# Patient Record
Sex: FEMALE | NOT HISPANIC OR LATINO | Employment: UNEMPLOYED | URBAN - METROPOLITAN AREA
[De-identification: names, ages, dates, MRNs, and addresses within clinical notes are randomized per-mention and may not be internally consistent; named-entity substitution may affect disease eponyms.]

---

## 2018-09-27 ENCOUNTER — TELEPHONE (OUTPATIENT)
Dept: PSYCHIATRY | Facility: CLINIC | Age: 43
End: 2018-09-27

## 2018-09-27 NOTE — TELEPHONE ENCOUNTER
PSYCHIATRY CLINIC PHONE INTAKE     SERVICES REQUESTED / INTERESTED IN          Med Management    Presenting Problem and Brief History                              What would you like to be seen for? (brief description):  Patient experiences irritability, self-isolation, rapid heart rate, headaches, and sometimes body tension. Patient is currently seeing a therapist and is in treatment for alcoholism for the past 3 months.  Have you received a mental health diagnosis? Yes   Which one (s): Anxiety and depression  Is there any history of developmental delay?  No   Are you currently seeing a mental health provider?  Yes            Who / month last seen:  Therapist  Do you have mental health records elsewhere?  Yes  Will you sign a release so we can obtain them?  Yes    Have you ever been hospitalized for psychiatric reasons?  Not for psychiatric reason  Describe:  Yazidism - April/May 2018    Do you have current thoughts of self-harm?  Yes  - Once in a while  Do you currently have thoughts of harming others?  No       Substance Use History     Do you have any history of alcohol / illicit drug use?  Yes  Describe:  Alcohol - sober 3 months  Have you ever received treatment for this?  Yes    Describe:  Currently in treatment     Social History     Does the patient have a guardian?  No      Have you had an ACT team in last 12 months?  No    Do you have any current or past legal issues?  No    OK to leave a detailed voicemail?  Yes    Medical/ Surgical History                                 There is no problem list on file for this patient.         Medications             No current outpatient prescriptions on file.     Buspirone   cymbalta   Trazodone 50mg  Xanax 0.5mg    DISPOSITION      Completed phone screen. Scheduled with Nelson Waddell.    Radha Sim,

## 2018-10-05 ENCOUNTER — OFFICE VISIT (OUTPATIENT)
Dept: PSYCHIATRY | Facility: CLINIC | Age: 43
End: 2018-10-05
Attending: NURSE PRACTITIONER
Payer: COMMERCIAL

## 2018-10-05 VITALS — DIASTOLIC BLOOD PRESSURE: 78 MMHG | HEART RATE: 104 BPM | SYSTOLIC BLOOD PRESSURE: 115 MMHG | WEIGHT: 293 LBS

## 2018-10-05 DIAGNOSIS — F41.9 ANXIETY: Primary | ICD-10-CM

## 2018-10-05 PROCEDURE — G0463 HOSPITAL OUTPT CLINIC VISIT: HCPCS | Mod: ZF

## 2018-10-05 RX ORDER — BUSPIRONE HYDROCHLORIDE 15 MG/1
15 TABLET ORAL 2 TIMES DAILY
Qty: 60 TABLET | Refills: 0 | Status: SHIPPED | OUTPATIENT
Start: 2018-10-05 | End: 2018-11-13

## 2018-10-05 RX ORDER — TRAZODONE HYDROCHLORIDE 100 MG/1
100 TABLET ORAL PRN
Qty: 30 TABLET | Refills: 0 | Status: SHIPPED | OUTPATIENT
Start: 2018-10-05 | End: 2018-11-13

## 2018-10-05 RX ORDER — PRAVASTATIN SODIUM 20 MG
20 TABLET ORAL PRN
COMMUNITY
End: 2021-08-04

## 2018-10-05 ASSESSMENT — PAIN SCALES - GENERAL: PAINLEVEL: SEVERE PAIN (7)

## 2018-10-05 NOTE — LETTER
Patient:  Mason Crowe  :   1975  MRN:     [id number]      2018    Patient Name:  Mason Crowe    Physician: ERNIE Isaacs CNP    Mason attended her psychiatric evaluation today from 1:00-2:00pm.  Her next appointment will be in 1 month.          ____________________________________  Nelson Waddell DNP, ERNIE        [id number]  1975
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
## 2018-10-05 NOTE — MR AVS SNAPSHOT
After Visit Summary   10/5/2018    Mason Crowe    MRN: [id number]           Patient Information     Date Of Birth          1975        Visit Information        Provider Department      10/5/2018 1:00 PM Nelson Waddell APRN CNP Psychiatry Clinic        Today's Diagnoses     Anxiety    -  1       Follow-ups after your visit        Your next 10 appointments already scheduled     Oct 31, 2018  5:00 PM CDT   Adult Med Follow UP with ERNIE Perdomo CNP   Psychiatry Clinic ([facility])    [address]  [address] [id number] [phone]              Who to contact     Please call your clinic at [phone] to:    Ask questions about your health    Make or cancel appointments    Discuss your medicines    Learn about your test results    Speak to your doctor            Additional Information About Your Visit        MyChart Information     Play2Focust is an electronic gateway that provides easy, online access to your medical records. With Slack, you can request a clinic appointment, read your test results, renew a prescription or communicate with your care team.     To sign up for [employer] visit the website at [email or web address]   You will be asked to enter the access code listed below, as well as some personal information. Please follow the directions to create your username and password.     Your access code is: [email or web address]  Expires: 2018  9:37 AM     Your access code will  in 90 days. If you need help or a new code, please contact your [facility] or call [phone] for assistance.        Care EveryWhere ID     This is your Care EveryWhere ID. This could be used by other organizations to access your [place] medical records  [id number]        Your Vitals Were     Pulse                   104            Blood Pressure from Last 3 Encounters:   10/05/18 115/78    Weight from  Last 3 Encounters:   10/05/18 137.1 kg (302 lb 3.2 oz)              Today, you had the following     No orders found for display         Today's Medication Changes          These changes are accurate as of 10/5/18 11:59 PM.  If you have any questions, ask your nurse or doctor.               These medicines have changed or have updated prescriptions.        Dose/Directions    busPIRone 15 MG tablet   Commonly known as:  BUSPAR   This may have changed:    - medication strength  - how much to take  - when to take this   Used for:  Anxiety   Changed by:  Nelson Waddell APRN CNP        Dose:  15 mg   Take 1 tablet (15 mg) by mouth 2 times daily   Quantity:  60 tablet   Refills:  0       traZODone 100 MG tablet   Commonly known as:  DESYREL   This may have changed:    - medication strength  - how much to take   Used for:  Anxiety   Changed by:  Nelson Waddell APRN CNP        Dose:  100 mg   Take 1 tablet (100 mg) by mouth as needed for sleep   Quantity:  30 tablet   Refills:  0            Where to get your medicines      These medications were sent to [facility] [address]  [address] [address]     Phone:  [phone]     busPIRone 15 MG tablet    traZODone 100 MG tablet                Primary Care Provider Fax #    Physician No Ref-Primary [phone]       No address on file        Equal Access to Services     FANI WILSON AH: Vince cardonao Somaggie, waaxda luqadaha, qaybta kaalmada adeegyada, darwin nance. So [facility] [phone].    ATENCIÓN: Si habla español, tiene a joiner disposición servicios gratuitos de asistencia lingüística. Llame al [phone].    We comply with applicable federal civil rights laws and [place] laws. We do not discriminate on the basis of race, color, national origin, age, disability, sex, sexual orientation, or gender identity.            Thank you!     Thank you for choosing PSYCHIATRY  CLINIC  for your care. Our goal is always to provide you with excellent care. Hearing back from our patients is one way we can continue to improve our services. Please take a few minutes to complete the written survey that you may receive in the mail after your visit with us. Thank you!             Your Updated Medication List - Protect others around you: Learn how to safely use, store and throw away your medicines at www.disposemymeds.org.          This list is accurate as of 10/5/18 11:59 PM.  Always use your most recent med list.                   Brand Name Dispense Instructions for use Diagnosis    AMOXICILLIN PO      Take by mouth 3 times daily        ASPIRIN PO      Take 81 mg by mouth daily        busPIRone 15 MG tablet    BUSPAR    60 tablet    Take 1 tablet (15 mg) by mouth 2 times daily    Anxiety       CYMBALTA PO      Take 30 mg by mouth daily        GABAPENTIN PO      Take 800 mg by mouth 3 times daily        IBUPROFEN PO      Take 600 mg by mouth every 6 hours        LASIX PO      Take 40 mg by mouth daily        pravastatin 20 MG tablet    PRAVACHOL     Take 20 mg by mouth as needed        traZODone 100 MG tablet    DESYREL    30 tablet    Take 1 tablet (100 mg) by mouth as needed for sleep    Anxiety       VERAPAMIL HCL PO      Take 240 mg by mouth daily

## 2018-10-29 ENCOUNTER — TELEPHONE (OUTPATIENT)
Dept: PSYCHIATRY | Facility: CLINIC | Age: 43
End: 2018-10-29

## 2018-10-29 ASSESSMENT — PATIENT HEALTH QUESTIONNAIRE - PHQ9: SUM OF ALL RESPONSES TO PHQ QUESTIONS 1-9: 20

## 2018-10-29 NOTE — TELEPHONE ENCOUNTER
On 10/5/18 the patient signed a [facility] authorizing person to person communication with Jeovany Crowe (son) for scheduling, medical, and billing information. This writer sent the original copy to scanning on 10/29/18, and a copy was kept in psychiatry until scanning complete. Daphne Aguilera LPN

## 2018-11-13 ENCOUNTER — OFFICE VISIT (OUTPATIENT)
Dept: PSYCHIATRY | Facility: CLINIC | Age: 43
End: 2018-11-13
Attending: NURSE PRACTITIONER
Payer: COMMERCIAL

## 2018-11-13 VITALS — SYSTOLIC BLOOD PRESSURE: 139 MMHG | WEIGHT: 293 LBS | HEART RATE: 85 BPM | DIASTOLIC BLOOD PRESSURE: 86 MMHG

## 2018-11-13 DIAGNOSIS — F32.1 MODERATE MAJOR DEPRESSION (H): Primary | ICD-10-CM

## 2018-11-13 DIAGNOSIS — F41.9 ANXIETY: ICD-10-CM

## 2018-11-13 PROCEDURE — G0463 HOSPITAL OUTPT CLINIC VISIT: HCPCS | Mod: ZF

## 2018-11-13 RX ORDER — ACETAMINOPHEN 500 MG
1000 TABLET ORAL EVERY 8 HOURS PRN
COMMUNITY
Start: 2018-10-24 | End: 2023-08-14

## 2018-11-13 RX ORDER — BUSPIRONE HYDROCHLORIDE 15 MG/1
15 TABLET ORAL 2 TIMES DAILY
Qty: 60 TABLET | Refills: 0 | Status: SHIPPED | OUTPATIENT
Start: 2018-11-13 | End: 2019-01-09

## 2018-11-13 RX ORDER — TRAZODONE HYDROCHLORIDE 100 MG/1
100 TABLET ORAL PRN
Qty: 30 TABLET | Refills: 0 | Status: SHIPPED | OUTPATIENT
Start: 2018-11-13 | End: 2019-03-26

## 2018-11-13 RX ORDER — DULOXETIN HYDROCHLORIDE 60 MG/1
60 CAPSULE, DELAYED RELEASE ORAL DAILY
Qty: 30 CAPSULE | Refills: 0 | Status: SHIPPED | OUTPATIENT
Start: 2018-11-13 | End: 2019-01-09

## 2018-11-13 ASSESSMENT — PAIN SCALES - GENERAL: PAINLEVEL: MODERATE PAIN (5)

## 2018-11-13 NOTE — PATIENT INSTRUCTIONS
Thank you for coming to the PSYCHIATRY CLINIC.    Lab Testing:  If you had lab testing today and your results are reassuring or normal they will be mailed to you or sent through [employer] within 7 days.   If the lab tests need quick action we will call you with the results.  The phone number we will call with results is # [phone] (home) . If this is not the best number please call our clinic and change the number.    Medication Refills:  If you need any refills please call your pharmacy and they will contact us. Our fax number for refills is [phone]. Please allow three business for refill processing.   If you need to  your refill at a new pharmacy, please contact the new pharmacy directly. The new pharmacy will help you get your medications transferred.     Scheduling:  If you have any concerns about today's visit or wish to schedule another appointment please call our office during normal business hours [phone] (8-5:00 M-F)    Contact Us:  Please call [phone] during business hours (8-5:00 M-F).  If after clinic hours, or on the weekend, please call  [phone].    Financial Assistance [phone]  [facility] Billing [phone]  [employer] Billing [phone]  Medical Records [phone]      MENTAL HEALTH CRISIS NUMBERS:  [facility]:   [facility] - [phone]   Crisis Residence [facility] - [phone]   Walk-In Counseling [facility] [phone]   COPE 24/7 [place] Mobile Team for Adults - [[phone]]; Child - [[phone]]     Crisis Connection - [phone]     [facility]:   [facility] - [phone]   Walk-in counseling [facility] - [phone]   Walk-in counseling [facility] - [phone]   Crisis Residence [facility] - [phone]   Urgent Care Adult Mental Health:   --Drop-in, 24/7 crisis line, and [place] Mobile Team [[phone]]    CRISIS TEXT  LINE: Text [phone] from anywhere, anytime, any crisis 24/7;    OR SEE www.crisistextline.org     Poison Control Center - [phone]    CHILD: [facility] needs assessment team - [phone]     [facility] - [phone]; or [employer] Lifeline - [phone]    If you have a medical emergency please call 911or go to the nearest ER.                    _____________________________________________    Again thank you for choosing PSYCHIATRY CLINIC and please let us know how we can best partner with you to improve you and your family's health.  You may be receiving a survey in the mail regarding this appointment. We would love to have your feedback, both positive and negative, so please fill out the survey and return it using the provided envelope. The survey is done by an external company, so your answers are anonymous.

## 2018-11-13 NOTE — MR AVS SNAPSHOT
After Visit Summary   11/13/2018    Mason Crowe    MRN: [id number]           Patient Information     Date Of Birth          1975        Visit Information        Provider Department      11/13/2018 3:30 PM Nelson Waddell APRN CNP Psychiatry Clinic        Today's Diagnoses     Moderate major depression (H)    -  1    Anxiety          Care Instructions    Thank you for coming to the PSYCHIATRY CLINIC.    Lab Testing:  If you had lab testing today and your results are reassuring or normal they will be mailed to you or sent through [employer] within 7 days.   If the lab tests need quick action we will call you with the results.  The phone number we will call with results is # [phone] (home) . If this is not the best number please call our clinic and change the number.    Medication Refills:  If you need any refills please call your pharmacy and they will contact us. Our fax number for refills is [phone]. Please allow three business for refill processing.   If you need to  your refill at a new pharmacy, please contact the new pharmacy directly. The new pharmacy will help you get your medications transferred.     Scheduling:  If you have any concerns about today's visit or wish to schedule another appointment please call our office during normal business hours [phone] (8-5:00 M-F)    Contact Us:  Please call [phone] during business hours (8-5:00 M-F).  If after clinic hours, or on the weekend, please call  [phone].    Financial Assistance [phone]  [employer] Billing [phone]  [facility] Billing [phone]  Medical Records [phone]      MENTAL HEALTH CRISIS NUMBERS:  [facility]:   [facility] - [phone]   Crisis Residence [facility] - [phone]   Walk-In Counseling Center [facility] - [phone]   COPE 24/7 [place] Mobile Team for Adults - [[phone]]; Child - [[phone]]     Crisis Connection -  [phone]     [facility] - [phone]   Walk-in counseling [facility] - [phone]   Walk-in counseling [facility] - [phone]   Crisis Residence [facility] - [phone]   Urgent Care Adult Mental Health:   --Drop-in, 24/7 crisis line, and [place] Mobile Team [[phone]]    CRISIS TEXT LINE: Text [phone] from anywhere, anytime, any crisis 24/7;    OR SEE www.crisistextline.org     Poison Control Center - [phone]    CHILD: [facility] needs assessment team - [phone]     [facility] Lifeline - [phone]; or [facility] Lifeline - [phone]    If you have a medical emergency please call 911or go to the nearest ER.                    _____________________________________________    Again thank you for choosing PSYCHIATRY CLINIC and please let us know how we can best partner with you to improve you and your family's health.  You may be receiving a survey in the mail regarding this appointment. We would love to have your feedback, both positive and negative, so please fill out the survey and return it using the provided envelope. The survey is done by an external company, so your answers are anonymous.             Follow-ups after your visit        Your next 10 appointments already scheduled     Dec 13, 2018  9:00 AM CST   Adult Med Follow UP with ERNIE Perdomo CNP   Psychiatry Clinic ([facility])    [address] [address] [phone]              Who to contact     Please call your clinic at [phone] to:    Ask questions about your health    Make or cancel appointments    Discuss your medicines    Learn about your test results    Speak to your doctor            Additional Information About Your Visit        MyChart Information     [employer] is an electronic gateway that provides easy, online access to your medical  records. With [employer], you can request a clinic appointment, read your test results, renew a prescription or communicate with your care team.     To sign up for [employer] visit the website at [email or web address]   You will be asked to enter the access code listed below, as well as some personal information. Please follow the directions to create your username and password.     Your access code is: [email or web address]  Expires: 2019  2:33 PM     Your access code will  in 90 days. If you need help or a new code, please contact your [facility] or call [phone] for assistance.        Care EveryWhere ID     This is your Care EveryWhere ID. This could be used by other organizations to access your [facility] medical records  [id number]        Your Vitals Were     Pulse                   85            Blood Pressure from Last 3 Encounters:   18 139/86   10/05/18 115/78    Weight from Last 3 Encounters:   18 138.1 kg (304 lb 6.4 oz)   10/05/18 137.1 kg (302 lb 3.2 oz)              Today, you had the following     No orders found for display         Today's Medication Changes          These changes are accurate as of 18  4:16 PM.  If you have any questions, ask your nurse or doctor.               These medicines have changed or have updated prescriptions.        Dose/Directions    DULoxetine 60 MG EC capsule   Commonly known as:  CYMBALTA   This may have changed:    - medication strength  - how much to take   Used for:  Anxiety, Moderate major depression (H)   Changed by:  Nelson Waddell, APRN CNP        Dose:  60 mg   Take 1 capsule (60 mg) by mouth daily   Quantity:  30 capsule   Refills:  0            Where to get your medicines      These medications were sent to [facility] [place], [place] - [address]  [address] [address]     Phone:  [phone]     busPIRone 15 MG tablet    DULoxetine 60 MG EC capsule     traZODone 100 MG tablet                Primary Care Provider Fax #    Physician No Ref-Primary [phone]       No address on file        Equal Access to Services     FANI WILSON : Hadii adams cruz amanda Grimm, savi mahmood, bereket jolly, darwin nance. So [facility] [phone].    ATENCIÓN: Si habla español, tiene a joiner disposición servicios gratuitos de asistencia lingüística. Llame al [phone].    We comply with applicable federal civil rights laws and [place] laws. We do not discriminate on the basis of race, color, national origin, age, disability, sex, sexual orientation, or gender identity.            Thank you!     Thank you for choosing PSYCHIATRY CLINIC  for your care. Our goal is always to provide you with excellent care. Hearing back from our patients is one way we can continue to improve our services. Please take a few minutes to complete the written survey that you may receive in the mail after your visit with us. Thank you!             Your Updated Medication List - Protect others around you: Learn how to safely use, store and throw away your medicines at www.disposemymeds.org.          This list is accurate as of 11/13/18  4:16 PM.  Always use your most recent med list.                   Brand Name Dispense Instructions for use Diagnosis    acetaminophen 500 MG tablet    TYLENOL     Take 1,000 mg by mouth 3 times daily        AMOXICILLIN PO      Take by mouth 3 times daily        ASPIRIN PO      Take 81 mg by mouth daily        busPIRone 15 MG tablet    BUSPAR    60 tablet    Take 1 tablet (15 mg) by mouth 2 times daily    Anxiety       DULoxetine 60 MG EC capsule    CYMBALTA    30 capsule    Take 1 capsule (60 mg) by mouth daily    Anxiety, Moderate major depression (H)       GABAPENTIN PO      Take 800 mg by mouth 3 times daily        IBUPROFEN PO      Take 600 mg by mouth every 6 hours        LASIX PO      Take 40 mg by mouth daily         pravastatin 20 MG tablet    PRAVACHOL     Take 20 mg by mouth as needed        traZODone 100 MG tablet    DESYREL    30 tablet    Take 1 tablet (100 mg) by mouth as needed for sleep    Anxiety       VERAPAMIL HCL PO      Take 240 mg by mouth daily
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
## 2018-11-15 ENCOUNTER — TELEPHONE (OUTPATIENT)
Dept: PSYCHIATRY | Facility: CLINIC | Age: 43
End: 2018-11-15

## 2018-11-20 NOTE — TELEPHONE ENCOUNTER
On 11/13/2018 provider Nelson Waddell faxed paperwork for patient to [facility] at [phone]. This writer sent the forms to scanning and kept a copy in psychiatry until scanning is complete/confirmed. This writer routed note to provider. Eunice Lujan MA

## 2018-12-05 ASSESSMENT — PATIENT HEALTH QUESTIONNAIRE - PHQ9: SUM OF ALL RESPONSES TO PHQ QUESTIONS 1-9: 11

## 2019-01-09 ENCOUNTER — OFFICE VISIT (OUTPATIENT)
Dept: PSYCHIATRY | Facility: CLINIC | Age: 44
End: 2019-01-09
Attending: NURSE PRACTITIONER
Payer: COMMERCIAL

## 2019-01-09 VITALS — SYSTOLIC BLOOD PRESSURE: 138 MMHG | WEIGHT: 293 LBS | DIASTOLIC BLOOD PRESSURE: 81 MMHG | HEART RATE: 92 BPM

## 2019-01-09 DIAGNOSIS — F41.9 ANXIETY: Primary | ICD-10-CM

## 2019-01-09 PROCEDURE — G0463 HOSPITAL OUTPT CLINIC VISIT: HCPCS | Mod: ZF

## 2019-01-09 RX ORDER — HYDROXYZINE HYDROCHLORIDE 10 MG/1
TABLET, FILM COATED ORAL
Qty: 120 TABLET | Refills: 0 | Status: SHIPPED | OUTPATIENT
Start: 2019-01-09 | End: 2019-02-11

## 2019-01-09 RX ORDER — SERTRALINE HYDROCHLORIDE 100 MG/1
TABLET, FILM COATED ORAL
Qty: 30 TABLET | Refills: 0 | Status: SHIPPED | OUTPATIENT
Start: 2019-01-09 | End: 2019-02-11

## 2019-01-09 RX ORDER — BUSPIRONE HYDROCHLORIDE 15 MG/1
TABLET ORAL
Refills: 0 | COMMUNITY
Start: 2019-01-09 | End: 2019-03-26 | Stop reason: ALTCHOICE
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
## 2019-02-11 DIAGNOSIS — F41.9 ANXIETY: ICD-10-CM

## 2019-02-11 RX ORDER — HYDROXYZINE HYDROCHLORIDE 10 MG/1
TABLET, FILM COATED ORAL
Qty: 120 TABLET | Refills: 0 | Status: SHIPPED | OUTPATIENT
Start: 2019-02-11 | End: 2019-03-22

## 2019-02-11 RX ORDER — SERTRALINE HYDROCHLORIDE 100 MG/1
TABLET, FILM COATED ORAL
Qty: 30 TABLET | Refills: 0 | Status: SHIPPED | OUTPATIENT
Start: 2019-02-11 | End: 2019-03-22
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
## 2019-03-22 DIAGNOSIS — F41.9 ANXIETY: ICD-10-CM
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
## 2019-03-25 DIAGNOSIS — F41.9 ANXIETY: ICD-10-CM

## 2019-03-26 ENCOUNTER — OFFICE VISIT (OUTPATIENT)
Dept: PSYCHIATRY | Facility: CLINIC | Age: 44
End: 2019-03-26
Attending: NURSE PRACTITIONER
Payer: COMMERCIAL

## 2019-03-26 VITALS — SYSTOLIC BLOOD PRESSURE: 106 MMHG | HEART RATE: 80 BPM | DIASTOLIC BLOOD PRESSURE: 72 MMHG | WEIGHT: 293 LBS

## 2019-03-26 DIAGNOSIS — F33.1 MODERATE EPISODE OF RECURRENT MAJOR DEPRESSIVE DISORDER (H): ICD-10-CM

## 2019-03-26 DIAGNOSIS — F41.9 RECURRENT ANXIETY: Primary | ICD-10-CM

## 2019-03-26 DIAGNOSIS — F41.9 ANXIETY: ICD-10-CM

## 2019-03-26 PROCEDURE — G0463 HOSPITAL OUTPT CLINIC VISIT: HCPCS | Mod: ZF

## 2019-03-26 PROCEDURE — 93005 ELECTROCARDIOGRAM TRACING: CPT | Mod: ZF | Performed by: NURSE PRACTITIONER

## 2019-03-26 PROCEDURE — 93010 ELECTROCARDIOGRAM REPORT: CPT | Performed by: INTERNAL MEDICINE

## 2019-03-26 RX ORDER — SERTRALINE HYDROCHLORIDE 100 MG/1
TABLET, FILM COATED ORAL
Qty: 7 TABLET | Refills: 0 | Status: SHIPPED | OUTPATIENT
Start: 2019-03-26 | End: 2019-03-26 | Stop reason: SINTOL

## 2019-03-26 RX ORDER — TRAZODONE HYDROCHLORIDE 100 MG/1
100 TABLET ORAL PRN
Qty: 30 TABLET | Refills: 0 | Status: SHIPPED | OUTPATIENT
Start: 2019-03-26 | End: 2019-11-21

## 2019-03-26 RX ORDER — HYDROXYZINE HYDROCHLORIDE 25 MG/1
25-50 TABLET, FILM COATED ORAL EVERY 8 HOURS PRN
Qty: 180 TABLET | Refills: 0 | Status: SHIPPED | OUTPATIENT
Start: 2019-03-26 | End: 2019-04-26

## 2019-03-26 RX ORDER — BUPROPION HYDROCHLORIDE 150 MG/1
150 TABLET ORAL EVERY MORNING
Qty: 30 TABLET | Refills: 0 | Status: SHIPPED | OUTPATIENT
Start: 2019-03-26 | End: 2019-04-26

## 2019-03-26 RX ORDER — HYDROXYZINE HYDROCHLORIDE 10 MG/1
TABLET, FILM COATED ORAL
Qty: 28 TABLET | Refills: 0 | Status: SHIPPED | OUTPATIENT
Start: 2019-03-26 | End: 2019-03-26

## 2019-03-26 ASSESSMENT — PAIN SCALES - GENERAL: PAINLEVEL: SEVERE PAIN (7)
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
## 2019-03-26 NOTE — PATIENT INSTRUCTIONS
Thank you for coming to the PSYCHIATRY CLINIC.    Lab Testing:  If you had lab testing today and your results are reassuring or normal they will be mailed to you or sent through [employer] within 7 days.   If the lab tests need quick action we will call you with the results.  The phone number we will call with results is # [phone] (home) . If this is not the best number please call our clinic and change the number.    Medication Refills:  If you need any refills please call your pharmacy and they will contact us. Our fax number for refills is [phone]. Please allow three business for refill processing.   If you need to  your refill at a new pharmacy, please contact the new pharmacy directly. The new pharmacy will help you get your medications transferred.     Scheduling:  If you have any concerns about today's visit or wish to schedule another appointment please call our office during normal business hours [phone] (8-5:00 M-F)    Contact Us:  Please call [phone] during business hours (8-5:00 M-F).  If after clinic hours, or on the weekend, please call  [phone].    Financial Assistance [phone]  [employer] Billing [phone]  [facility] Billing [phone]  Medical Records [phone]      MENTAL HEALTH CRISIS NUMBERS:  [facility]:   [facility] - [phone]   Crisis Residence [facility] - [phone]   Walk-In Counseling [facility] [phone]   COPE 24/7 Tell Mobile Team for Adults - [[phone]]; Child - [[phone]]        [facility]:   [facility] - [phone]   Walk-in counseling [facility] - [phone]   Walk-in counseling [facility] - [phone]   Crisis Residence [facility] Residence - [phone]   Urgent Care Adult Mental Health:   --Drop-in, 24/7 crisis line, and [employer] Mobile Team [[phone]]    CRISIS TEXT LINE: Text [phone] from anywhere,  anytime, any crisis 24/7;    OR SEE www.crisistextline.org     Poison Control Center - [phone]    CHILD: [facility] needs assessment team - [phone]     [facility] - [phone]; or [employer] [facility] - [phone]    If you have a medical emergency please call 911or go to the nearest ER.                    _____________________________________________    Again thank you for choosing PSYCHIATRY CLINIC and please let us know how we can best partner with you to improve you and your family's health.  You may be receiving a survey in the mail regarding this appointment. We would love to have your feedback, both positive and negative, so please fill out the survey and return it using the provided envelope. The survey is done by an external company, so your answers are anonymous.

## 2019-03-27 LAB — INTERPRETATION ECG - MUSE: NORMAL

## 2019-03-27 RX ORDER — HYDROXYZINE HYDROCHLORIDE 10 MG/1
TABLET, FILM COATED ORAL
Qty: 120 TABLET | Refills: 0 | OUTPATIENT
Start: 2019-03-27

## 2019-03-27 RX ORDER — SERTRALINE HYDROCHLORIDE 100 MG/1
TABLET, FILM COATED ORAL
Qty: 30 TABLET | Refills: 0 | OUTPATIENT
Start: 2019-03-27

## 2019-03-27 ASSESSMENT — PATIENT HEALTH QUESTIONNAIRE - PHQ9: SUM OF ALL RESPONSES TO PHQ QUESTIONS 1-9: 15

## 2019-04-26 ENCOUNTER — OFFICE VISIT (OUTPATIENT)
Dept: PSYCHIATRY | Facility: CLINIC | Age: 44
End: 2019-04-26
Attending: NURSE PRACTITIONER
Payer: COMMERCIAL

## 2019-04-26 DIAGNOSIS — F41.9 ANXIETY: ICD-10-CM

## 2019-04-26 DIAGNOSIS — F33.1 MODERATE EPISODE OF RECURRENT MAJOR DEPRESSIVE DISORDER (H): ICD-10-CM

## 2019-04-26 RX ORDER — BUPROPION HYDROCHLORIDE 150 MG/1
150 TABLET ORAL EVERY MORNING
Qty: 30 TABLET | Refills: 0 | Status: SHIPPED | OUTPATIENT
Start: 2019-04-26 | End: 2019-06-10

## 2019-04-26 RX ORDER — HYDROXYZINE HYDROCHLORIDE 25 MG/1
25-50 TABLET, FILM COATED ORAL EVERY 8 HOURS PRN
Qty: 180 TABLET | Refills: 0 | Status: SHIPPED | OUTPATIENT
Start: 2019-04-26 | End: 2019-06-10
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
## 2019-06-07 DIAGNOSIS — F41.9 ANXIETY: ICD-10-CM

## 2019-06-07 DIAGNOSIS — F33.1 MODERATE EPISODE OF RECURRENT MAJOR DEPRESSIVE DISORDER (H): ICD-10-CM

## 2019-06-07 NOTE — TELEPHONE ENCOUNTER
[facility] Call Center    Phone Message    May a detailed message be left on voicemail: yes    Reason for Call: Medication Refill Request    Has the patient contacted the pharmacy for the refill? Yes   Name of medication being requested: bupropion 150mg, hydroxyzine 25mg  Provider who prescribed the medication: ERNIE Aaron CNP  Pharmacy: [facility] - [address] [place] [id number]  Date medication is needed: ASAP - pt hoping for today, has been out for a week         Action Taken: Message routed to:  Other: Nursing pool

## 2019-06-10 RX ORDER — BUPROPION HYDROCHLORIDE 150 MG/1
150 TABLET ORAL EVERY MORNING
Qty: 30 TABLET | Refills: 0 | Status: SHIPPED | OUTPATIENT
Start: 2019-06-10 | End: 2019-07-09

## 2019-06-10 RX ORDER — HYDROXYZINE HYDROCHLORIDE 25 MG/1
25-50 TABLET, FILM COATED ORAL EVERY 8 HOURS PRN
Qty: 180 TABLET | Refills: 0 | Status: SHIPPED | OUTPATIENT
Start: 2019-06-10 | End: 2019-07-09

## 2019-06-10 NOTE — TELEPHONE ENCOUNTER
Nelson Waddell, APRN CNP  You 3 days ago      No concerns with restarting    Routing comment      Mason Crowe [phone]  Radha Sim     Writer e-prescribed 30-day supply to [facility] ([phone]). Refills 0.  Writer called pt, identified that refills had been sent, and coached pt on restarting medications. Pt identified understanding.

## 2019-07-09 DIAGNOSIS — F41.9 ANXIETY: ICD-10-CM

## 2019-07-09 DIAGNOSIS — F33.1 MODERATE EPISODE OF RECURRENT MAJOR DEPRESSIVE DISORDER (H): ICD-10-CM

## 2019-07-11 RX ORDER — BUPROPION HYDROCHLORIDE 150 MG/1
150 TABLET ORAL EVERY MORNING
Qty: 30 TABLET | Refills: 0 | Status: SHIPPED | OUTPATIENT
Start: 2019-07-11 | End: 2019-09-05

## 2019-07-11 RX ORDER — HYDROXYZINE HYDROCHLORIDE 25 MG/1
25-50 TABLET, FILM COATED ORAL EVERY 8 HOURS PRN
Qty: 180 TABLET | Refills: 0 | Status: SHIPPED | OUTPATIENT
Start: 2019-07-11 | End: 2019-09-05

## 2019-07-11 NOTE — TELEPHONE ENCOUNTER
Medication requested:   buPROPion (WELLBUTRIN XL) 150 MG 24 hr tablet  hydrOXYzine (ATARAX) 25 MG tablet  Last refilled: 6/10/19  Qty:   30  180      Last seen: 4/26/19  RTC: 1 month  Cancel: 1  No-show: 0  Next appt: 7/25/19    Refill decision:   Refill pended and routed to the provider for review/determination due to   Cancel x 1  Pt outside of RTC timeframe.

## 2019-09-03 DIAGNOSIS — F41.9 ANXIETY: ICD-10-CM

## 2019-09-03 DIAGNOSIS — F33.1 MODERATE EPISODE OF RECURRENT MAJOR DEPRESSIVE DISORDER (H): ICD-10-CM

## 2019-09-03 NOTE — TELEPHONE ENCOUNTER
-Writer called pt to gather more information.    -Per pt, her insurance is now activated. She has other appts on Thursday, 9/5, which is why she asked to schedule with him that day. She said it's too difficult to take other days off of work and can't get another day off until 10/1. Agreed to discuss her concerns with the provider. Pt would prefer to talk about medications with the provider in person vs having him refill medications prior to the appt. She has been without her meds for approximately 7 days now. If the provider feels it's beneficial to restart the medications, she's willing to do so ahead of her upcoming appt. Although, she will need to find which pharmacy takes her insurance.     -Message routed to provider and pt placed on wait list/cancellation list for this Thursday, 9/5.

## 2019-09-05 RX ORDER — HYDROXYZINE HYDROCHLORIDE 25 MG/1
25-50 TABLET, FILM COATED ORAL EVERY 8 HOURS PRN
Qty: 180 TABLET | Refills: 0 | Status: SHIPPED | OUTPATIENT
Start: 2019-09-05 | End: 2019-11-05

## 2019-09-05 RX ORDER — BUPROPION HYDROCHLORIDE 150 MG/1
150 TABLET ORAL EVERY MORNING
Qty: 30 TABLET | Refills: 1 | Status: SHIPPED | OUTPATIENT
Start: 2019-09-05 | End: 2019-11-05

## 2019-09-05 NOTE — TELEPHONE ENCOUNTER
Nelson Waddell APRN CNP Labossiere, Laura, RN   Caller: Unspecified (2 days ago, 12:27 PM)             We can start the Wellbutrin 150mg again and same dose of hydroxyzine.     thanks    Previous            -Writer sent both prescriptions to [facility] in [place].     -Called pt and LVM with the above information.

## 2019-09-05 NOTE — TELEPHONE ENCOUNTER
Nelson Waddell APRN CNP Labossiere, Laura, RN   Caller: Unspecified (2 days ago, 12:27 PM)             Denis [place],     That's fine if she does not want to restart.  I guess we'll get her in as soon as we can.       Thanks        -Writer reviewed provider's schedule. He still has no openings for today.    -Called pt and relayed this. She confirmed she will not be able to see the provider until October and so she asked to restart the bupropion and hydroxyzine. Agreed to discuss this with the provider.    -Pt would like prescriptions sent to [facility] pharmacy in [place] before 2 pm this afternoon if possible.    -Message routed to the provider

## 2019-10-01 ENCOUNTER — OFFICE VISIT (OUTPATIENT)
Dept: PSYCHIATRY | Facility: CLINIC | Age: 44
End: 2019-10-01
Attending: NURSE PRACTITIONER
Payer: COMMERCIAL

## 2019-10-01 DIAGNOSIS — F33.1 MODERATE EPISODE OF RECURRENT MAJOR DEPRESSIVE DISORDER (H): ICD-10-CM
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
## 2019-10-02 ASSESSMENT — PATIENT HEALTH QUESTIONNAIRE - PHQ9: SUM OF ALL RESPONSES TO PHQ QUESTIONS 1-9: 16

## 2019-11-05 DIAGNOSIS — F41.9 ANXIETY: ICD-10-CM

## 2019-11-05 DIAGNOSIS — F33.1 MODERATE EPISODE OF RECURRENT MAJOR DEPRESSIVE DISORDER (H): ICD-10-CM

## 2019-11-05 RX ORDER — HYDROXYZINE HYDROCHLORIDE 25 MG/1
25-50 TABLET, FILM COATED ORAL EVERY 8 HOURS PRN
Qty: 180 TABLET | Refills: 0 | Status: SHIPPED | OUTPATIENT
Start: 2019-11-05 | End: 2019-11-29

## 2019-11-05 RX ORDER — BUPROPION HYDROCHLORIDE 150 MG/1
150 TABLET ORAL EVERY MORNING
Qty: 30 TABLET | Refills: 0 | Status: SHIPPED | OUTPATIENT
Start: 2019-11-05 | End: 2019-11-21

## 2019-11-06 NOTE — TELEPHONE ENCOUNTER
Medication requested:   WELLBUTRIN 150 MG TAB  ATARAX 25 MG TAB    Last refilled:   10/4/19  9/13/19  Qty:   30  180      Last seen: 10/1/19  RTC: 2 MONTHS  Cancel: 0  No-show: 0  Next appt: 11/15/19    Refill decision:   Refilled for 30 days per protocol.

## 2019-11-21 ENCOUNTER — OFFICE VISIT (OUTPATIENT)
Dept: PSYCHIATRY | Facility: CLINIC | Age: 44
End: 2019-11-21
Attending: NURSE PRACTITIONER
Payer: COMMERCIAL

## 2019-11-21 DIAGNOSIS — F33.1 MODERATE EPISODE OF RECURRENT MAJOR DEPRESSIVE DISORDER (H): ICD-10-CM

## 2019-11-21 RX ORDER — BUPROPION HYDROCHLORIDE 300 MG/1
300 TABLET ORAL EVERY MORNING
Qty: 30 TABLET | Refills: 1 | Status: SHIPPED | OUTPATIENT
Start: 2019-11-21 | End: 2020-02-21
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
## 2019-11-21 NOTE — PATIENT INSTRUCTIONS
Thank you for coming to the PSYCHIATRY CLINIC.    Lab Testing:  If you had lab testing today and your results are reassuring or normal they will be mailed to you or sent through [employer] within 7 days.   If the lab tests need quick action we will call you with the results.  The phone number we will call with results is # [phone] (home) . If this is not the best number please call our clinic and change the number.    Medication Refills:  If you need any refills please call your pharmacy and they will contact us. Our fax number for refills is [phone]. Please allow three business for refill processing.   If you need to  your refill at a new pharmacy, please contact the new pharmacy directly. The new pharmacy will help you get your medications transferred.     Scheduling:  If you have any concerns about today's visit or wish to schedule another appointment please call our office during normal business hours [phone] (8-5:00 M-F)    Contact Us:  Please call [phone] during business hours (8-5:00 M-F).  If after clinic hours, or on the weekend, please call  [phone].    Financial Assistance [phone]  [facility] Billing [phone]  Central Billing Office, [facility]: [phone]  [facility] [place] Billing [phone]  Medical Records [phone]      MENTAL HEALTH CRISIS NUMBERS:  [facility]:   [facility] - [phone]   Crisis Residence [facility] - [phone]   Walk-In Counseling [facility] [phone]   COPE 24/7 [place] Mobile Team for Adults - [[phone]]; Child - [[phone]]        [facility]:   [facility] - [phone]   Walk-in counseling [facility] - [phone]   Walk-in counseling [facility] - [phone]   Crisis Residence [facility] - [phone]   Urgent Care Adult Mental Health:   --Drop-in, 24/7 crisis line, and [place] Mobile Team  [[phone]]    CRISIS TEXT LINE: Text [phone] from anywhere, anytime, any crisis 24/7;    OR SEE www.crisistextline.org     Poison Control Center - [phone]    CHILD: [facility] needs assessment team - [phone]     [facility] - [phone]; or [facility] - [phone]    If you have a medical emergency please call 911or go to the nearest ER.                    _____________________________________________    Again thank you for choosing PSYCHIATRY CLINIC and please let us know how we can best partner with you to improve you and your family's health.  You may be receiving a survey in the mail regarding this appointment. We would love to have your feedback, both positive and negative, so please fill out the survey and return it using the provided envelope. The survey is done by an external company, so your answers are anonymous.

## 2019-11-29 DIAGNOSIS — F41.9 ANXIETY: ICD-10-CM

## 2019-12-01 NOTE — TELEPHONE ENCOUNTER
Medication requested: HYDROXYZINE HCL 25MG TABS  Last refilled: 11/11/19  Qty: 180      Last seen: 11/21/19  RTC: 2 months  Cancel: 0  No-show: 0  Next appt: 12/18/19    Refill decision:   Refill pended and routed to the provider for review/determination due to   Last note from 11/21/19 is not complete

## 2019-12-04 RX ORDER — HYDROXYZINE HYDROCHLORIDE 25 MG/1
25-50 TABLET, FILM COATED ORAL EVERY 8 HOURS PRN
Qty: 180 TABLET | Refills: 0 | Status: SHIPPED | OUTPATIENT
Start: 2019-12-04 | End: 2020-03-18

## 2019-12-26 ASSESSMENT — PATIENT HEALTH QUESTIONNAIRE - PHQ9: SUM OF ALL RESPONSES TO PHQ QUESTIONS 1-9: 12

## 2020-02-20 DIAGNOSIS — F33.1 MODERATE EPISODE OF RECURRENT MAJOR DEPRESSIVE DISORDER (H): ICD-10-CM

## 2020-02-20 NOTE — TELEPHONE ENCOUNTER
----- Message from Genesis Bauer RN sent at 2/20/2020 11:54 AM CST -----    ----- Message -----  From: Muhumed, Yasmin  Sent: 2/20/2020  10:57 AM CST  To: [facility] Psychiatry [facility] Call Center    Phone Message    May a detailed message be left on voicemail: yes     Reason for Call: Medication Refill Request    Has the patient contacted the pharmacy for the refill? Yes   Name of medication being requested: Wellbutrin 150  Provider who prescribed the medication: Nelson Waddell   Pharmacy: [facility] on [address]   Date medication is needed: pt says she is all out of medication and needs to get a refill as soon as possible          Action Taken: Other: nurse pool    Travel Screening: Not Applicable

## 2020-02-20 NOTE — TELEPHONE ENCOUNTER
Writer reviewed pt's chart and fellow RN's documentation. Appears pt is not taking bupropion consistently.    Called pt and LVM requesting a c/b to discuss her RF request further.

## 2020-02-20 NOTE — TELEPHONE ENCOUNTER
Received RF request from patient     Last seen: 11/21/2019  RTC: 1 month  Cancel: none  No-show: one - 12/18  Next appt: 3/03/2020     Medication requested: buPROPion (WELLBUTRIN XL) 300 MG 24 hr tablet  Directions: Take 1 tablet (300 mg) by mouth every morning   Qty: 30  Last Rx written 11/21/2019 for 30 ds with 1 rf       Plan per 11/21 office visit:    - Continue Gabapentin 800mg TID for alcohol cravings  - Continue Hydroxyzine 25-50mg TID PRN for anxiety and itching.    - Increase Wellbutrin XL to 300mg daily for energy, motivation, attention.

## 2020-02-21 RX ORDER — BUPROPION HYDROCHLORIDE 150 MG/1
150 TABLET ORAL EVERY MORNING
Qty: 7 TABLET | Refills: 0 | Status: SHIPPED | OUTPATIENT
Start: 2020-02-21 | End: 2020-03-31 | Stop reason: ALTCHOICE

## 2020-02-21 RX ORDER — BUPROPION HYDROCHLORIDE 300 MG/1
300 TABLET ORAL EVERY MORNING
Qty: 5 TABLET | Refills: 0 | Status: SHIPPED | OUTPATIENT
Start: 2020-02-21 | End: 2020-03-03

## 2020-02-21 NOTE — TELEPHONE ENCOUNTER
Nelson Waddell, APRN Kita Sandhu RN   Caller: Unspecified (Today,  8:05 AM)             Ok.  Lets give her a weeks worth of Wellbutrin  then 300mg.  Not sure how many tabs on 300 though...enough to get her back to clinic..        Writer called pt. No answer. LVM with the above recommendation. Requested a c/b if she has additional questions.    Writer sent 7 d/s of Wellbutrin 150 mg XL and separate Rx for Wellbutrin 300 mg, #5 to get pt to appt on 3/3.

## 2020-02-21 NOTE — TELEPHONE ENCOUNTER
Writer called the number provided. Call went straight to  for someone by the name of Pam CLARKE requesting a c/b from pt if it was the correct number.    Called the home phone number on file, which is no longer in service. Will wait for a return phone call from the pt.
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
## 2020-02-21 NOTE — TELEPHONE ENCOUNTER
Patient called to check if Nelson Waddell had an available appointment today.  Patient reports she has been out of her medications (Wellbutrin, hydroxyzine) for two weeks and is not doing well.  Patient would like a call back at [phone].  It is ok to leave a detailed message.    Patient has appointment with provider scheduled 3/3/20.      Message routed to Psychiatry Nurse Pool

## 2020-03-03 ENCOUNTER — OFFICE VISIT (OUTPATIENT)
Dept: PSYCHIATRY | Facility: CLINIC | Age: 45
End: 2020-03-03
Attending: NURSE PRACTITIONER
Payer: COMMERCIAL

## 2020-03-03 VITALS — SYSTOLIC BLOOD PRESSURE: 138 MMHG | HEART RATE: 76 BPM | DIASTOLIC BLOOD PRESSURE: 94 MMHG | WEIGHT: 293 LBS

## 2020-03-03 DIAGNOSIS — F33.1 MODERATE EPISODE OF RECURRENT MAJOR DEPRESSIVE DISORDER (H): ICD-10-CM

## 2020-03-03 PROCEDURE — G0463 HOSPITAL OUTPT CLINIC VISIT: HCPCS | Mod: ZF

## 2020-03-03 RX ORDER — SOLIFENACIN SUCCINATE 5 MG/1
5 TABLET, FILM COATED ORAL DAILY
COMMUNITY
End: 2021-08-04

## 2020-03-03 RX ORDER — BUPROPION HYDROCHLORIDE 150 MG/1
TABLET ORAL
Qty: 7 TABLET | Refills: 0 | Status: SHIPPED | OUTPATIENT
Start: 2020-03-03 | End: 2020-03-31 | Stop reason: ALTCHOICE

## 2020-03-03 RX ORDER — SERTRALINE HYDROCHLORIDE 100 MG/1
TABLET, FILM COATED ORAL
Qty: 30 TABLET | Refills: 0 | Status: SHIPPED | OUTPATIENT
Start: 2020-03-03 | End: 2020-03-31

## 2020-03-03 ASSESSMENT — PAIN SCALES - GENERAL: PAINLEVEL: EXTREME PAIN (8)
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
## 2020-03-15 DIAGNOSIS — F41.9 ANXIETY: ICD-10-CM

## 2020-03-17 NOTE — TELEPHONE ENCOUNTER
Last seen: 3/3/20  RTC: uncertain, as note is unsigned  Cancel: none  No-show: none  Next appt: 3/31/20     Incoming refill from pharmacy via electronic request     Medication requested: hydrOXYzine (ATARAX) 25 MG tablet   Directions: Take 1-2 tablets (25-50 mg) by mouth every 8 hours as needed for itching or anxiety  Qty: 180  Last refilled: 12/4/19 per outside med rec     -Routed to provider, as last office visit note is unsigned.

## 2020-03-18 RX ORDER — HYDROXYZINE HYDROCHLORIDE 25 MG/1
TABLET, FILM COATED ORAL
Qty: 180 TABLET | Refills: 0 | Status: SHIPPED | OUTPATIENT
Start: 2020-03-18 | End: 2020-03-31

## 2020-03-31 ENCOUNTER — VIRTUAL VISIT (OUTPATIENT)
Dept: PSYCHIATRY | Facility: CLINIC | Age: 45
End: 2020-03-31
Attending: NURSE PRACTITIONER
Payer: COMMERCIAL

## 2020-03-31 DIAGNOSIS — F33.1 MODERATE EPISODE OF RECURRENT MAJOR DEPRESSIVE DISORDER (H): ICD-10-CM

## 2020-03-31 DIAGNOSIS — F41.9 ANXIETY: ICD-10-CM

## 2020-03-31 DIAGNOSIS — F90.8 ATTENTION DEFICIT HYPERACTIVITY DISORDER (ADHD), OTHER TYPE: Primary | ICD-10-CM

## 2020-03-31 RX ORDER — HYDROXYZINE HYDROCHLORIDE 25 MG/1
TABLET, FILM COATED ORAL
Qty: 180 TABLET | Refills: 0 | Status: SHIPPED | OUTPATIENT
Start: 2020-03-31 | End: 2020-04-16

## 2020-03-31 RX ORDER — ATOMOXETINE 40 MG/1
40 CAPSULE ORAL DAILY
Qty: 30 CAPSULE | Refills: 0 | Status: SHIPPED | OUTPATIENT
Start: 2020-03-31 | End: 2020-04-16

## 2020-03-31 RX ORDER — SERTRALINE HYDROCHLORIDE 100 MG/1
150 TABLET, FILM COATED ORAL DAILY
Qty: 45 TABLET | Refills: 1 | Status: SHIPPED | OUTPATIENT
Start: 2020-03-31 | End: 2020-05-29
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
## 2020-04-16 ENCOUNTER — TELEPHONE (OUTPATIENT)
Dept: PSYCHIATRY | Facility: CLINIC | Age: 45
End: 2020-04-16

## 2020-04-16 ENCOUNTER — VIRTUAL VISIT (OUTPATIENT)
Dept: PSYCHIATRY | Facility: CLINIC | Age: 45
End: 2020-04-16
Attending: NURSE PRACTITIONER
Payer: COMMERCIAL

## 2020-04-16 DIAGNOSIS — F41.9 ANXIETY: ICD-10-CM

## 2020-04-16 DIAGNOSIS — F90.8 ATTENTION DEFICIT HYPERACTIVITY DISORDER (ADHD), OTHER TYPE: ICD-10-CM

## 2020-04-16 RX ORDER — ATOMOXETINE 40 MG/1
40 CAPSULE ORAL DAILY
Qty: 30 CAPSULE | Refills: 0 | Status: SHIPPED | OUTPATIENT
Start: 2020-04-16 | End: 2020-05-19

## 2020-04-16 RX ORDER — HYDROXYZINE HYDROCHLORIDE 25 MG/1
TABLET, FILM COATED ORAL
Qty: 180 TABLET | Refills: 0 | Status: SHIPPED | OUTPATIENT
Start: 2020-04-16 | End: 2020-06-19
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
## 2020-04-16 NOTE — TELEPHONE ENCOUNTER
On 4/16/2020, at 1630, writer called patient at mobile to confirm Virtual Visit. Writer unable to make contact with patient. Unable to leave VM. RADHA Counsell, EMT

## 2020-04-29 ENCOUNTER — TELEPHONE (OUTPATIENT)
Dept: PSYCHIATRY | Facility: CLINIC | Age: 45
End: 2020-04-29

## 2020-04-29 NOTE — TELEPHONE ENCOUNTER
"Writer called pt to gather more information. She informed this writer that she has been struggling with increased anxiety and is feeling overwhelmed. The pt said, \"I'm miserable and don't know why.\"    She went onto say that over the last few days, she has felt overwhelmed with her job. 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 This leads to her feeling irritable even before taking the call. The pt said she's even hung up on a few clients this week, which is not good, as the phone calls are recorded. She's worried she'll get in trouble for this.    Overall, she reports an increase in irritability and anxiety. This leads to the pt staying in bed all day. She doesn't get up to get dressed, cook, or complete other ADLs. She denies any negative self talk, SI/SIB, or safety concerns. Also, she's isolating and said she won't talk to her roommates or call her mother as usual. The pt is uncertain if it's depression. She feels it's mostly due to the increase in irritability. The pt said, \"I just don't want to talk to anyone.\"     Because she works from home, she's able to sleep in and denies any issues with sleep. She also denies any alcohol cravings.     Asked it patient is able to take time off of work for the remainder of the week. Pt said she took yesterday and today off and doubts they will allow more time off, even if she has a letter from the provider. Pt is also worried this could jeopardize her job.     Pt continues to take her medications as prescribed. She isn't sure if an adjustment would be helpful, but is willing to try anything the provider recommends. Writer inquired about therapy. Pt has not attended therapy for awhile due to her work schedule conflicting with sessions.     Agreed to relay all of this to the provider and informed her that writer or provider will c/b with thoughts and " recommendations. Pt scheduled for 5/14.

## 2020-04-29 NOTE — TELEPHONE ENCOUNTER
[facility] Call Center    Phone Message    May a detailed message be left on voicemail: yes     Reason for Call: Other: Pt requested to speak with Nelson Bairon. She's having high anxiety and doesn't know what to do. She is ok with getting a call back from the nurse if necessary.      Action Taken: Other: [employer]    Travel Screening: Not Applicable

## 2020-04-30 NOTE — TELEPHONE ENCOUNTER
Nelson Waddell, APRN Kita Sandhu, RN    Caller: Unspecified (Yesterday,  8:16 AM)               Denis East,     Therapy is a must but I know it will be hard for her.  Also could be due to atomoxetine.  I'll talk with her on 5/14     Thanks      Writer tried calling pt. No answer and no VM set up. Will try again later today.

## 2020-05-14 ENCOUNTER — TELEPHONE (OUTPATIENT)
Dept: PSYCHIATRY | Facility: CLINIC | Age: 45
End: 2020-05-14

## 2020-05-14 NOTE — TELEPHONE ENCOUNTER
On 5/14/2020, at 1632, writer called patient at mobile to confirm Virtual Visit. Writer unable to make contact with patient. Writer left detailed voice message for callback. [phone], left as call back number. RADHA Watson, EMT

## 2020-05-17 DIAGNOSIS — F90.8 ATTENTION DEFICIT HYPERACTIVITY DISORDER (ADHD), OTHER TYPE: ICD-10-CM

## 2020-05-19 NOTE — TELEPHONE ENCOUNTER
Medication requested:  ATOMOXETINE 40MG CAPSULES   Last refilled: 4/16/20  Qty: 30/0      Last seen: 4/16/20  RTC: 1month  Cancel: 0  No-show: 1  Next appt: None     Refill decision:   Refill pended and routed to the provider for review/determination due to  NSH x 1, no appt. Scheduling has been notified to contact the pt for appointment.

## 2020-05-21 RX ORDER — ATOMOXETINE 40 MG/1
CAPSULE ORAL
Qty: 30 CAPSULE | Refills: 0 | Status: SHIPPED | OUTPATIENT
Start: 2020-05-21 | End: 2020-06-26

## 2020-05-29 ENCOUNTER — VIRTUAL VISIT (OUTPATIENT)
Dept: PSYCHIATRY | Facility: CLINIC | Age: 45
End: 2020-05-29
Attending: NURSE PRACTITIONER
Payer: COMMERCIAL

## 2020-05-29 DIAGNOSIS — F33.1 MODERATE EPISODE OF RECURRENT MAJOR DEPRESSIVE DISORDER (H): ICD-10-CM

## 2020-05-29 RX ORDER — SERTRALINE HYDROCHLORIDE 100 MG/1
150 TABLET, FILM COATED ORAL DAILY
Qty: 45 TABLET | Refills: 1 | Status: SHIPPED | OUTPATIENT
Start: 2020-05-29 | End: 2020-07-22

## 2020-05-29 RX ORDER — GABAPENTIN 400 MG/1
800 CAPSULE ORAL 3 TIMES DAILY
Qty: 180 CAPSULE | Refills: 0 | Status: SHIPPED | OUTPATIENT
Start: 2020-05-29 | End: 2020-06-26
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
## 2020-06-16 DIAGNOSIS — F41.9 ANXIETY: ICD-10-CM

## 2020-06-18 NOTE — TELEPHONE ENCOUNTER
hydrOXYzine (ATARAX) 25 MG   Last refilled: 4/16/20  Qty: 180    Last seen:  5/29/20  RTC: 1  MOS  Cancel: 0  No-show: 0  Next appt: 6/26/20  Refill pended and routed to the provider for review/determination due to   UNSIGNED  / INCOMPLETE NOTE

## 2020-06-19 RX ORDER — HYDROXYZINE HYDROCHLORIDE 25 MG/1
TABLET, FILM COATED ORAL
Qty: 180 TABLET | Refills: 0 | Status: SHIPPED | OUTPATIENT
Start: 2020-06-19 | End: 2020-07-22

## 2020-06-26 ENCOUNTER — VIRTUAL VISIT (OUTPATIENT)
Dept: PSYCHIATRY | Facility: CLINIC | Age: 45
End: 2020-06-26
Attending: NURSE PRACTITIONER
Payer: COMMERCIAL

## 2020-06-26 DIAGNOSIS — F41.9 ANXIETY: ICD-10-CM

## 2020-06-26 DIAGNOSIS — F90.8 ATTENTION DEFICIT HYPERACTIVITY DISORDER (ADHD), OTHER TYPE: ICD-10-CM

## 2020-06-26 DIAGNOSIS — F33.1 MODERATE EPISODE OF RECURRENT MAJOR DEPRESSIVE DISORDER (H): ICD-10-CM

## 2020-06-26 RX ORDER — ATOMOXETINE 40 MG/1
40 CAPSULE ORAL DAILY
Qty: 30 CAPSULE | Refills: 1 | Status: SHIPPED | OUTPATIENT
Start: 2020-06-26 | End: 2020-09-02

## 2020-06-26 RX ORDER — GABAPENTIN 400 MG/1
800 CAPSULE ORAL 3 TIMES DAILY
Qty: 180 CAPSULE | Refills: 1 | Status: SHIPPED | OUTPATIENT
Start: 2020-06-26 | End: 2020-09-02

## 2020-06-26 ASSESSMENT — PAIN SCALES - GENERAL: PAINLEVEL: NO PAIN (0)

## 2020-06-26 NOTE — PATIENT INSTRUCTIONS
Thank you for coming to the PSYCHIATRY CLINIC.    Lab Testing:  If you had lab testing today and your results are reassuring or normal they will be mailed to you or sent through [employer] within 7 days. If the lab tests need quick action we will call you with the results. The phone number we will call with results is # [phone] (home) . If this is not the best number please call our clinic and change the number.    Medication Refills:  If you need any refills please call your pharmacy and they will contact us. Our fax number for refills is [phone]. Please allow three business for refill processing. If you need to  your refill at a new pharmacy, please contact the new pharmacy directly. The new pharmacy will help you get your medications transferred.     Scheduling:  If you have any concerns about today's visit or wish to schedule another appointment please call our office during normal business hours [phone] (8-5:00 M-F)    Contact Us:  Please call [phone] during business hours (8-5:00 M-F).  If after clinic hours, or on the weekend, please call  [phone].    Financial Assistance [phone]  [email or web address] Billing [phone]  Central Billing Office, [email or web address]: [phone]  [facility] Billing [phone]  Medical Records [phone]      MENTAL HEALTH CRISIS NUMBERS:  For a medical emergency please call  911 or go to the nearest ER.     [facility]:   [facility] -[phone]   Crisis Residence [facility] -[phone]   Walk-In Counseling [facility] -[phone]   COPE 24/7 [facility] Mobile Team -[phone] (adults)/[phone] (child)  CHILD: [facility] needs assessment team - [phone]      [facility]:   [facility] - [phone]   Walk-in counseling [facility] - [phone]   Walk-in counseling [facility] - [phone]   Crisis Residence [facility] - [phone]  Urgent  [facility] Adult [facility]-[phone] mobile unit/ 24/7 crisis line    National Crisis Numbers:   National Suicide Prevention Lifeline: 3-271-783-TALK (491-169-2895)  Poison Control Center - 1-740-862-2241  [employer]/resources for a list of additional resources (SOS)  Trans Lifeline a hotline for transgender people 5-822-179-2007  [employer] a hotline for LGBT youth 1-901.692.3366  Crisis Text Line: For any crisis 24/7   To: 191833  see www.crisistextline.org  - IF MAKING A CALL FEELS TOO HARD, send a text!         Again thank you for choosing PSYCHIATRY CLINIC and please let us know how we can best partner with you to improve you and your family's health.    You may be receiving a survey regarding this appointment. We would love to have your feedback, both positive and negative. The survey is done by an external company, so your answers are anonymous.

## 2020-07-19 DIAGNOSIS — F41.9 ANXIETY: ICD-10-CM

## 2020-07-21 ENCOUNTER — TELEPHONE (OUTPATIENT)
Dept: PSYCHIATRY | Facility: CLINIC | Age: 45
End: 2020-07-21

## 2020-07-21 DIAGNOSIS — F41.9 ANXIETY: ICD-10-CM

## 2020-07-21 DIAGNOSIS — F33.1 MODERATE EPISODE OF RECURRENT MAJOR DEPRESSIVE DISORDER (H): ICD-10-CM

## 2020-07-21 RX ORDER — HYDROXYZINE HYDROCHLORIDE 25 MG/1
TABLET, FILM COATED ORAL
Qty: 180 TABLET | Refills: 0 | OUTPATIENT
Start: 2020-07-21

## 2020-07-21 NOTE — TELEPHONE ENCOUNTER
[facility] Call Center    Phone Message    May a detailed message be left on voicemail: yes     Reason for Call: Medication Refill Request    Has the patient contacted the pharmacy for the refill? Yes   Name of medication being requested: hydroxizine  Provider who prescribed the medication: Nelson Waddell  Pharmacy:      [id number] - [place], [place] - [address] AT [facility] [address]    Date medication is needed: pt's refill was denied.    She also only got 15 tabs of zoloft, will need more to get her to appointment in august      Action Taken: Message routed to:  Other: nursing pool    Travel Screening: Not Applicable

## 2020-07-22 RX ORDER — HYDROXYZINE HYDROCHLORIDE 25 MG/1
TABLET, FILM COATED ORAL
Qty: 180 TABLET | Refills: 0 | Status: SHIPPED | OUTPATIENT
Start: 2020-07-22 | End: 2020-08-19

## 2020-07-22 RX ORDER — SERTRALINE HYDROCHLORIDE 100 MG/1
150 TABLET, FILM COATED ORAL DAILY
Qty: 45 TABLET | Refills: 0 | Status: SHIPPED | OUTPATIENT
Start: 2020-07-22 | End: 2020-08-19

## 2020-07-22 NOTE — TELEPHONE ENCOUNTER
Last seen: 6/26/20  RTC: 2 months  Cancel: none  No-show: none  Next appt: 8/27/20     Medication requested: hydrOXYzine (ATARAX) 25 MG tablet   Directions: TAKE 1-2 TABLETS BY MOUTH EVERY 8 HOURS AS NEEDED FOR ANXIETY OR ITCHING   Qty: 180  Last refilled: approximately 6/19/20 per med tab      Medication requested: sertraline (ZOLOFT) 100 MG tablet  Directions: Take 1.5 tablets (150 mg) by mouth daily   Qty: 45  Last refilled: approximately 6/29/20 per med tab      Medication refill approved per refill protocol    Additional #180 of hydroxyzine and 30 d/s of Zoloft sent to pt's preferred pharmacy     Called pt and LVM notifying her of the RFs

## 2020-08-16 DIAGNOSIS — F33.1 MODERATE EPISODE OF RECURRENT MAJOR DEPRESSIVE DISORDER (H): ICD-10-CM

## 2020-08-16 DIAGNOSIS — F41.9 ANXIETY: ICD-10-CM

## 2020-08-19 RX ORDER — HYDROXYZINE HYDROCHLORIDE 25 MG/1
TABLET, FILM COATED ORAL
Qty: 180 TABLET | Refills: 0 | Status: SHIPPED | OUTPATIENT
Start: 2020-08-19 | End: 2020-10-02

## 2020-08-19 RX ORDER — SERTRALINE HYDROCHLORIDE 100 MG/1
TABLET, FILM COATED ORAL
Qty: 45 TABLET | Refills: 0 | Status: SHIPPED | OUTPATIENT
Start: 2020-08-19 | End: 2020-09-02

## 2020-08-19 NOTE — TELEPHONE ENCOUNTER
hydrOXYzine (ATARAX) 25 MG  Last refilled: 7/22/20  Qty: 180    sertraline (ZOLOFT) 100 MG      Last refilled: 7/22/20  Qty: 45    Last seen:  6/26/20  RTC: 2  mos  Cancel: 0  No-show: 0  Next appt: 8/27/20  Refill decision:  Refilled for 30 days per protocol.

## 2020-08-27 ENCOUNTER — TELEPHONE (OUTPATIENT)
Dept: PSYCHIATRY | Facility: CLINIC | Age: 45
End: 2020-08-27

## 2020-09-02 ENCOUNTER — VIRTUAL VISIT (OUTPATIENT)
Dept: PSYCHIATRY | Facility: CLINIC | Age: 45
End: 2020-09-02
Attending: NURSE PRACTITIONER
Payer: COMMERCIAL

## 2020-09-02 DIAGNOSIS — F41.9 ANXIETY: Primary | ICD-10-CM

## 2020-09-02 DIAGNOSIS — F90.8 ATTENTION DEFICIT HYPERACTIVITY DISORDER (ADHD), OTHER TYPE: ICD-10-CM

## 2020-09-02 DIAGNOSIS — F33.1 MODERATE EPISODE OF RECURRENT MAJOR DEPRESSIVE DISORDER (H): ICD-10-CM

## 2020-09-02 RX ORDER — ATOMOXETINE 40 MG/1
40 CAPSULE ORAL DAILY
Qty: 30 CAPSULE | Refills: 1 | Status: SHIPPED | OUTPATIENT
Start: 2020-09-02 | End: 2020-10-12

## 2020-09-02 RX ORDER — GABAPENTIN 400 MG/1
800 CAPSULE ORAL 3 TIMES DAILY
Qty: 180 CAPSULE | Refills: 1 | Status: SHIPPED | OUTPATIENT
Start: 2020-09-02 | End: 2020-10-12

## 2020-09-02 RX ORDER — SERTRALINE HYDROCHLORIDE 100 MG/1
TABLET, FILM COATED ORAL
Qty: 45 TABLET | Refills: 0 | Status: SHIPPED | OUTPATIENT
Start: 2020-09-02 | End: 2020-10-02

## 2020-09-02 ASSESSMENT — PAIN SCALES - GENERAL: PAINLEVEL: NO PAIN (0)
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
## 2020-09-02 NOTE — PROGRESS NOTES
"VIDEO VISIT  Mason Crowe is a 45 year old patient who is being evaluated via a billable video visit.      The patient has been notified of following:   \"This video visit will be conducted via a call between you and your physician/provider. We have found that certain health care needs can be provided without the need for an in-person physical exam. This service lets us provide the care you need with a video conversation. If a prescription is necessary we can send it directly to your pharmacy. If lab work is needed we can place an order for that and you can then stop by our lab to have the test done at a later time. Insurers are generally covering virtual visits as they would in-office visits so billing should not be different than normal.  If for some reason you do get billed incorrectly, you should contact the billing office to correct it and that number is in the AVS .    Video Conference to be completed via:  [email or web address]    Patient has given verbal consent for video visit?:  Yes    Patient would prefer that any video invitations be sent by: Send to e-mail at: [email or web address]      How would patient like to obtain AVS?:  Patient declined    AVS SmartPhrase [PsychAVS] has been placed in 'Patient Instructions':  Yes    "

## 2020-09-02 NOTE — PATIENT INSTRUCTIONS
Thank you for coming to the PSYCHIATRY CLINIC.    Lab Testing:  If you had lab testing today and your results are reassuring or normal they will be mailed to you or sent through [employer] within 7 days. If the lab tests need quick action we will call you with the results. The phone number we will call with results is # [phone] (home) . If this is not the best number please call our clinic and change the number.    Medication Refills:  If you need any refills please call your pharmacy and they will contact us. Our fax number for refills is [phone]. Please allow three business for refill processing. If you need to  your refill at a new pharmacy, please contact the new pharmacy directly. The new pharmacy will help you get your medications transferred.     Scheduling:  If you have any concerns about today's visit or wish to schedule another appointment please call our office during normal business hours [phone] (8-5:00 M-F)    Contact Us:  Please call [phone] during business hours (8-5:00 M-F).  If after clinic hours, or on the weekend, please call  [phone].    Financial Assistance [phone]  [facility] Billing [phone]  Central Billing Office, [facility]: [phone]  [facility]illing [phone]  Medical Records [phone]      MENTAL HEALTH CRISIS NUMBERS:  For a medical emergency please call  911 or go to the nearest ER.     [facility]:   [facility] -[phone]   Crisis Residence [facility] Residence -[phone]   Walk-In Counseling [facility] -[phone]   COPE 24/7 [place] Mobile Team -[phone] (adults)/[phone] (child)  CHILD: [facility] needs assessment team - [phone]      [facility]:   [facility] - [phone]   Walk-in counseling [facility] - [phone]   Walk-in counseling [facility] - [phone]   Crisis Residence [facility] - [phone]  Urgent  [facility] Adult Mental Xjeajy-[phone] mobile unit/ 24/7 crisis line    National Crisis Numbers:   National Suicide Prevention Lifeline: 2-184-161-TALK (859-894-2543)  Poison Control Center - 2-048-262-3743  Ask Ziggy/resources for a list of additional resources (SOS)  Trans Lifeline a hotline for transgender people 7-201-221-6055  The [employer] Project a hotline for LGBT youth 1-360.213.4487  Crisis Text Line: For any crisis 24/7   To: 758745  see www.crisistextline.org  - IF MAKING A CALL FEELS TOO HARD, send a text!         Again thank you for choosing PSYCHIATRY CLINIC and please let us know how we can best partner with you to improve you and your family's health.    You may be receiving a survey regarding this appointment. We would love to have your feedback, both positive and negative. The survey is done by an external company, so your answers are anonymous.

## 2020-09-29 DIAGNOSIS — F33.1 MODERATE EPISODE OF RECURRENT MAJOR DEPRESSIVE DISORDER (H): ICD-10-CM

## 2020-09-29 DIAGNOSIS — F41.9 ANXIETY: ICD-10-CM

## 2020-10-02 RX ORDER — SERTRALINE HYDROCHLORIDE 100 MG/1
TABLET, FILM COATED ORAL
Qty: 45 TABLET | Refills: 0 | Status: SHIPPED | OUTPATIENT
Start: 2020-10-02 | End: 2020-10-12

## 2020-10-02 RX ORDER — HYDROXYZINE HYDROCHLORIDE 25 MG/1
TABLET, FILM COATED ORAL
Qty: 180 TABLET | Refills: 0 | Status: SHIPPED | OUTPATIENT
Start: 2020-10-02 | End: 2020-11-18

## 2020-10-02 NOTE — TELEPHONE ENCOUNTER
Medication requested: HYDROXYZINE HCL 25MG TABS (WHITE)  Last RX:8-19-20,  180 tab:0    Medication requested: NAC 600MG CAPSULES  Last Rx: 9-2-20,  60 tab:0        Medication requested: SERTRALINE 100MG TABLETS  Last Rx: 9-2-20, 45 tab :0    Last seen: 9-2-20  RTC: 1 Month  Cancel: 0  No-show: 0  Next appt: 10-12-20    Refill decision:   Refilled for 30 days per protocol.

## 2020-10-12 ENCOUNTER — VIRTUAL VISIT (OUTPATIENT)
Dept: PSYCHIATRY | Facility: CLINIC | Age: 45
End: 2020-10-12
Attending: NURSE PRACTITIONER
Payer: COMMERCIAL

## 2020-10-12 DIAGNOSIS — F41.9 ANXIETY: ICD-10-CM

## 2020-10-12 DIAGNOSIS — F90.8 ATTENTION DEFICIT HYPERACTIVITY DISORDER (ADHD), OTHER TYPE: ICD-10-CM

## 2020-10-12 DIAGNOSIS — F33.1 MODERATE EPISODE OF RECURRENT MAJOR DEPRESSIVE DISORDER (H): ICD-10-CM

## 2020-10-12 PROCEDURE — 99213 OFFICE O/P EST LOW 20 MIN: CPT | Mod: GT | Performed by: NURSE PRACTITIONER

## 2020-10-12 RX ORDER — ATOMOXETINE 40 MG/1
40 CAPSULE ORAL DAILY
Qty: 30 CAPSULE | Refills: 2 | Status: SHIPPED | OUTPATIENT
Start: 2020-10-12 | End: 2021-01-04

## 2020-10-12 RX ORDER — SERTRALINE HYDROCHLORIDE 100 MG/1
150 TABLET, FILM COATED ORAL DAILY
Qty: 45 TABLET | Refills: 2 | Status: SHIPPED | OUTPATIENT
Start: 2020-10-12 | End: 2021-01-04

## 2020-10-12 RX ORDER — GABAPENTIN 400 MG/1
800 CAPSULE ORAL 3 TIMES DAILY
Qty: 180 CAPSULE | Refills: 2 | Status: SHIPPED | OUTPATIENT
Start: 2020-10-12 | End: 2021-01-04

## 2020-10-12 ASSESSMENT — PAIN SCALES - GENERAL: PAINLEVEL: NO PAIN (0)

## 2020-10-12 NOTE — PATIENT INSTRUCTIONS
Thank you for coming to the [facility].    Lab Testing:  If you had lab testing today and your results are reassuring or normal they will be mailed to you or sent through kenxus within 7 days. If the lab tests need quick action we will call you with the results. The phone number we will call with results is # [phone] (home) . If this is not the best number please call our clinic and change the number.    Medication Refills:  If you need any refills please call your pharmacy and they will contact us. Our fax number for refills is [phone]. Please allow three business for refill processing. If you need to  your refill at a new pharmacy, please contact the new pharmacy directly. The new pharmacy will help you get your medications transferred.     Scheduling:  If you have any concerns about today's visit or wish to schedule another appointment please call our office during normal business hours [phone] (8-5:00 M-F)    Contact Us:  Please call [phone] during business hours (8-5:00 M-F).  If after clinic hours, or on the weekend, please call  [phone].    Financial Assistance [phone]  [facility] Billing [phone]  Central Billing Office, [facility]: [phone]  [facility]illing [phone]  Medical Records [phone]      MENTAL HEALTH CRISIS NUMBERS:  For a medical emergency please call  911 or go to the nearest ER.     [facility]:   [facility] -[phone]   Crisis Residence [facility] -[phone]   Walk-In Counseling [facility] -[phone]   COPE 24/7 [place] Mobile Team -[phone] (adults)/[phone] (child)  CHILD: [facility] needs assessment team - [phone]      [facility]:   [facility] - [phone]   Walk-in counseling [facility] - [phone]   Walk-in counseling [facility] - [phone]   Crisis Residence [facility]  [facility] - [phone]  Urgent Care Adult Mental [facility]-[phone] mobile unit/ 24/7 crisis line    National Crisis Numbers:   National Suicide Prevention Lifeline: 9-614-422-TALK (120-572-9545)  Poison Control Center - 8-291-525-8792  [employer]/resources for a list of additional resources (SOS)  Trans Lifeline a hotline for transgender people 1-332-765-3290  [employer] a hotline for LGBT youth 5-151-877-6455  Crisis Text Line: For any crisis 24/7   To: 346304  see www.crisistextline.org  - IF MAKING A CALL FEELS TOO HARD, send a text!         Again thank you for choosing [facility] ADDI[facility] [place] [facility] and please let us know how we can best partner with you to improve you and your family's health.    You may be receiving a survey regarding this appointment. We would love to have your feedback, both positive and negative. The survey is done by an external company, so your answers are anonymous.
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
## 2020-11-15 DIAGNOSIS — F41.9 ANXIETY: ICD-10-CM

## 2020-11-18 RX ORDER — HYDROXYZINE HYDROCHLORIDE 25 MG/1
25-50 TABLET, FILM COATED ORAL EVERY 8 HOURS PRN
Qty: 180 TABLET | Refills: 0 | Status: SHIPPED | OUTPATIENT
Start: 2020-11-18 | End: 2020-12-15

## 2020-11-19 NOTE — TELEPHONE ENCOUNTER
Medication requested: HYDROXYZINE HCL 25MG TABS   Last refilled: 10/12/20  Qty: 180      Last seen: 10/2/20  RTC: 3 months  Cancel: 0  No-show: 0  Next appt: 0    Refill decision:   30 day graciela refill sent to the pharmacy - including instructions for patient to call the clinic and schedule an appointment.  Scheduling has been notified to contact the pt for appointment.

## 2020-12-13 DIAGNOSIS — F41.9 ANXIETY: ICD-10-CM

## 2020-12-15 RX ORDER — HYDROXYZINE HYDROCHLORIDE 25 MG/1
TABLET, FILM COATED ORAL
Qty: 180 TABLET | Refills: 0 | Status: SHIPPED | OUTPATIENT
Start: 2020-12-15 | End: 2021-01-25

## 2021-01-04 ENCOUNTER — VIRTUAL VISIT (OUTPATIENT)
Dept: PSYCHIATRY | Facility: CLINIC | Age: 46
End: 2021-01-04
Attending: NURSE PRACTITIONER
Payer: COMMERCIAL

## 2021-01-04 ENCOUNTER — TELEPHONE (OUTPATIENT)
Dept: PSYCHIATRY | Facility: CLINIC | Age: 46
End: 2021-01-04

## 2021-01-04 DIAGNOSIS — F33.1 MODERATE EPISODE OF RECURRENT MAJOR DEPRESSIVE DISORDER (H): ICD-10-CM

## 2021-01-04 DIAGNOSIS — F90.8 ATTENTION DEFICIT HYPERACTIVITY DISORDER (ADHD), OTHER TYPE: ICD-10-CM

## 2021-01-04 DIAGNOSIS — F41.9 ANXIETY: ICD-10-CM

## 2021-01-04 PROCEDURE — 99213 OFFICE O/P EST LOW 20 MIN: CPT | Mod: GT | Performed by: NURSE PRACTITIONER

## 2021-01-04 RX ORDER — GABAPENTIN 400 MG/1
800 CAPSULE ORAL 3 TIMES DAILY
Qty: 180 CAPSULE | Refills: 2 | Status: SHIPPED | OUTPATIENT
Start: 2021-01-04 | End: 2021-04-06

## 2021-01-04 RX ORDER — SERTRALINE HYDROCHLORIDE 100 MG/1
150 TABLET, FILM COATED ORAL DAILY
Qty: 45 TABLET | Refills: 2 | Status: SHIPPED | OUTPATIENT
Start: 2021-01-04 | End: 2021-03-29

## 2021-01-04 RX ORDER — ATOMOXETINE 80 MG/1
80 CAPSULE ORAL DAILY
Qty: 30 CAPSULE | Refills: 2 | Status: SHIPPED | OUTPATIENT
Start: 2021-01-04 | End: 2021-03-29

## 2021-01-04 NOTE — TELEPHONE ENCOUNTER
On January 4, 2021, at 2:15 PM, writer called patient at [phone] to confirm Virtual Visit. Writer unable to make contact with patient. Writer unable to leave detailed voice mail message due to voice mail box full. Ashlyn Isaac, [facility]
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
## 2021-01-20 DIAGNOSIS — F41.9 ANXIETY: ICD-10-CM

## 2021-01-25 NOTE — TELEPHONE ENCOUNTER
Medication requested: hydrOXYzine (ATARAX) 25 MG tablet  Last refilled: 12/15/20  Qty: 180      Last seen: 1/4/21  RTC: 3 months  Cancel: 0  No-show: 0  Next appt: 0    Refill decision:   Refill pended and routed to the provider for review/determination due to   Last note from 1/4/21 is not complete..  Scheduling has been notified to contact the pt for appointment.

## 2021-01-26 RX ORDER — HYDROXYZINE HYDROCHLORIDE 25 MG/1
25-50 TABLET, FILM COATED ORAL EVERY 8 HOURS PRN
Qty: 180 TABLET | Refills: 0 | Status: SHIPPED | OUTPATIENT
Start: 2021-01-26 | End: 2021-03-03

## 2021-01-31 DIAGNOSIS — F90.8 ATTENTION DEFICIT HYPERACTIVITY DISORDER (ADHD), OTHER TYPE: ICD-10-CM

## 2021-02-03 RX ORDER — ATOMOXETINE 40 MG/1
CAPSULE ORAL
Qty: 30 CAPSULE | Refills: 2 | OUTPATIENT
Start: 2021-02-03

## 2021-03-02 DIAGNOSIS — F41.9 ANXIETY: ICD-10-CM

## 2021-03-02 NOTE — TELEPHONE ENCOUNTER
Last seen: 1/4  RTC: 1 month  Cancel: none  No-show: none  Next appt: 4/6      Disp Refills Start End AMY   hydrOXYzine (ATARAX) 25 MG tablet 180 tablet 0 1/26/2021  No   Sig - Route: Take 1-2 tablets (25-50 mg) by mouth every 8 hours as needed for itching or anxiety - Oral     Last refilled:1/26     Medication pended and routed to provider for approval.

## 2021-03-02 NOTE — TELEPHONE ENCOUNTER
----- Message from Donna Woo RN sent at 3/2/2021  4:36 PM CST -----  Regarding: FW: adam pt  Contact: [phone]    ----- Message -----  From: Ana Patterson  Sent: 3/2/2021   4:25 PM CST  To: [facility]  Subject: adam pt                                    Caller:  Self  Relationship to pt: Self  Medication:   hydrOXYzine (ATARAX) 25 MG tablet  How many days left of med do you have left (if >3 day supply, redirect to pharmacy): 3  Pharmacy and location: [id number] - [place], [place] - [address] AT [facility] [address]  Pending appt date:  4/6 @ 3pm  Okay to leave detailed VM:  yes, [phone]    ## Pt requested confirmation call once refill goes through

## 2021-03-03 RX ORDER — HYDROXYZINE HYDROCHLORIDE 25 MG/1
25-50 TABLET, FILM COATED ORAL EVERY 8 HOURS PRN
Qty: 180 TABLET | Refills: 0 | Status: SHIPPED | OUTPATIENT
Start: 2021-03-03 | End: 2021-04-01

## 2021-03-29 DIAGNOSIS — F90.8 ATTENTION DEFICIT HYPERACTIVITY DISORDER (ADHD), OTHER TYPE: ICD-10-CM

## 2021-03-29 DIAGNOSIS — F33.1 MODERATE EPISODE OF RECURRENT MAJOR DEPRESSIVE DISORDER (H): ICD-10-CM

## 2021-03-29 DIAGNOSIS — F41.9 ANXIETY: ICD-10-CM

## 2021-03-30 NOTE — TELEPHONE ENCOUNTER
Medication requested: hydrOXYzine (ATARAX) 25 MG tablet Take 1-2 tablets (25-50 mg) by mouth every 8 hours as needed for itching or anxiety   Last refilled: 3/3/2021  Qty: 180/0  atomoxetine (STRATTERA) 80 MG capsule  Last refilled: 1/4/2021  Qty: 30/2  sertraline (ZOLOFT) 100 MG tablet  Last refilled: 1/4/2021  Qty: 45/2   Last seen: 1/4  RTC: 1 month  Cancel: none  No-show: none  Next appt: 4/6         Refill decision:   Refill pended and routed to the provider for review/determination due to outside of time frame, 1/4/2021 recommended RTC 1 month> has appt 4/6/2021. Thanks

## 2021-04-01 RX ORDER — HYDROXYZINE HYDROCHLORIDE 25 MG/1
25-50 TABLET, FILM COATED ORAL EVERY 8 HOURS PRN
Qty: 180 TABLET | Refills: 0 | Status: SHIPPED | OUTPATIENT
Start: 2021-04-01 | End: 2021-04-30

## 2021-04-01 RX ORDER — SERTRALINE HYDROCHLORIDE 100 MG/1
150 TABLET, FILM COATED ORAL DAILY
Qty: 45 TABLET | Refills: 0 | Status: SHIPPED | OUTPATIENT
Start: 2021-04-01 | End: 2021-04-06

## 2021-04-01 RX ORDER — ATOMOXETINE 80 MG/1
80 CAPSULE ORAL DAILY
Qty: 30 CAPSULE | Refills: 0 | Status: SHIPPED | OUTPATIENT
Start: 2021-04-01 | End: 2021-04-06

## 2021-04-06 ENCOUNTER — VIRTUAL VISIT (OUTPATIENT)
Dept: PSYCHIATRY | Facility: CLINIC | Age: 46
End: 2021-04-06
Attending: NURSE PRACTITIONER
Payer: COMMERCIAL

## 2021-04-06 DIAGNOSIS — F32.0 MILD MAJOR DEPRESSION (H): ICD-10-CM

## 2021-04-06 DIAGNOSIS — F90.8 ATTENTION DEFICIT HYPERACTIVITY DISORDER (ADHD), OTHER TYPE: ICD-10-CM

## 2021-04-06 DIAGNOSIS — F41.9 ANXIETY: ICD-10-CM

## 2021-04-06 DIAGNOSIS — F33.1 MODERATE EPISODE OF RECURRENT MAJOR DEPRESSIVE DISORDER (H): ICD-10-CM

## 2021-04-06 PROCEDURE — 99213 OFFICE O/P EST LOW 20 MIN: CPT | Mod: GT | Performed by: NURSE PRACTITIONER

## 2021-04-06 RX ORDER — GABAPENTIN 400 MG/1
800 CAPSULE ORAL 3 TIMES DAILY
Qty: 180 CAPSULE | Refills: 1 | Status: SHIPPED | OUTPATIENT
Start: 2021-04-06 | End: 2021-07-01

## 2021-04-06 RX ORDER — ATOMOXETINE 80 MG/1
80 CAPSULE ORAL DAILY
Qty: 30 CAPSULE | Refills: 1 | Status: SHIPPED | OUTPATIENT
Start: 2021-04-06 | End: 2021-06-09

## 2021-04-06 RX ORDER — SERTRALINE HYDROCHLORIDE 100 MG/1
150 TABLET, FILM COATED ORAL DAILY
Qty: 45 TABLET | Refills: 1 | Status: SHIPPED | OUTPATIENT
Start: 2021-04-06 | End: 2021-06-09

## 2021-04-06 ASSESSMENT — PAIN SCALES - GENERAL: PAINLEVEL: EXTREME PAIN (9)
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
## 2021-04-06 NOTE — PATIENT INSTRUCTIONS
**For crisis resources, please see the information at the end of this document**     Patient Education      Thank you for coming to the [facility].    Lab Testing:  If you had lab testing today and your results are reassuring or normal they will be mailed to you or sent through Modulus within 7 days. If the lab tests need quick action we will call you with the results. The phone number we will call with results is # [phone] (home) . If this is not the best number please call our clinic and change the number.    Medication Refills:  If you need any refills please call your pharmacy and they will contact us. Our fax number for refills is [phone]. Please allow three business for refill processing. If you need to  your refill at a new pharmacy, please contact the new pharmacy directly. The new pharmacy will help you get your medications transferred.     Scheduling:  If you have any concerns about today's visit or wish to schedule another appointment please call our office during normal business hours [phone] (8-5:00 M-F)    Contact Us:  Please call [phone] during business hours (8-5:00 M-F).  If after clinic hours, or on the weekend, please call  [phone].    Financial Assistance [phone]  [facility] Billing [phone]  Central Billing Office, [facility]: [phone]  [facility] Billing [phone]  Medical Records [phone]  [facility] Patient Bill of Rights [email or web address]       MENTAL HEALTH CRISIS NUMBERS:  For a medical emergency please call  911 or go to the nearest ER.     [facility]:   [facility] -[phone]   Crisis Residence [facility] Residence -[phone]   Walk-In Counseling Center hospitals -[phone]   COPE 24/7 [place] Mobile Team -[phone] (adults)/[phone] (child)  CHILD: [facility] needs assessment  team - [phone]      [facility]:   [facility] - [phone]   Walk-in counseling [facility] - [phone]   Walk-in counseling [facility] - [phone]   Crisis Residence [facility] - [phone]  Urgent Care Adult Mental Tjovln-[phone] mobile unit/ 24/7 crisis line    National Crisis Numbers:   National Suicide Prevention Lifeline: 5-703-659-TALK (102-592-0434)  Poison Control Center - 6-513-148-1700  Roomtag/resources for a list of additional resources (SOS)  Trans Lifeline a hotline for transgender people 1-668.961.8234  The Minh Project a hotline for LGBT youth 0-349-551-1394  Crisis Text Line: For any crisis 24/7   To: 854056  see www.crisistextline.org  - IF MAKING A CALL FEELS TOO HARD, send a text!         Again thank you for choosing [facility] MENTAL HEALTH & ADDICTION [facility] and please let us know how we can best partner with you to improve you and your family's health.    You may be receiving a survey regarding this appointment. We would love to have your feedback, both positive and negative. The survey is done by an external company, so your answers are anonymous.

## 2021-04-08 ENCOUNTER — TELEPHONE (OUTPATIENT)
Dept: PSYCHIATRY | Facility: CLINIC | Age: 46
End: 2021-04-08

## 2021-04-08 NOTE — TELEPHONE ENCOUNTER
Social Work   Incoming/Outgoing Call  [facility]    Outgoing Call To: Mason Crowe  Callback number: [phone]    Reason for Call:  To assist patient with finding a therapist who accepts her insurance.    Response/Plan: I called Mason and left a voicemail with my direct contact number.    Will route to patient's current psychiatric provider(s) as an FYI.   Please call or EPIC message with any questions or concerns.    Sherita Irene,  DE[facility]RI[facility], [facility]  [phone] [phone]

## 2021-04-30 ENCOUNTER — TELEPHONE (OUTPATIENT)
Dept: PSYCHIATRY | Facility: CLINIC | Age: 46
End: 2021-04-30

## 2021-04-30 DIAGNOSIS — F41.9 ANXIETY: ICD-10-CM

## 2021-04-30 RX ORDER — HYDROXYZINE HYDROCHLORIDE 25 MG/1
25-50 TABLET, FILM COATED ORAL EVERY 8 HOURS PRN
Qty: 180 TABLET | Refills: 0 | Status: CANCELLED | OUTPATIENT
Start: 2021-04-30

## 2021-04-30 RX ORDER — HYDROXYZINE HYDROCHLORIDE 25 MG/1
25-50 TABLET, FILM COATED ORAL EVERY 8 HOURS PRN
Qty: 180 TABLET | Refills: 0 | Status: SHIPPED | OUTPATIENT
Start: 2021-04-30 | End: 2021-06-02

## 2021-04-30 NOTE — TELEPHONE ENCOUNTER
Health Call Center    Phone Message    May a detailed message be left on voicemail: yes     Reason for Call: Medication Refill Request    Has the patient contacted the pharmacy for the refill? Yes   Name of medication being requested: hydroxyzine  Provider who prescribed the medication: Nelson Waddell  Pharmacy:    [id number] - [place], [place] - [address] AT [facility] [address]    Date medication is needed: ASAP - patient is out of medication        Action Taken: Message routed to:  Other: Psychiatry Nurse Pool    Travel Screening: Not Applicable

## 2021-04-30 NOTE — TELEPHONE ENCOUNTER
Last seen: 4/6  RTC: 1 month  Cancel: none  No-show: none  Next appt: 5/11     Disp Refills Start End AMY    hydrOXYzine (ATARAX) 25 MG tablet 180 tablet 0 4/1/2021  No   Sig - Route: Take 1-2 tablets (25-50 mg) by mouth every 8 hours as needed for itching or anxiety - Oral     Last refilled: 4/1      Medication refill approved per refill protocol

## 2021-05-11 ENCOUNTER — TELEPHONE (OUTPATIENT)
Dept: PSYCHIATRY | Facility: CLINIC | Age: 46
End: 2021-05-11

## 2021-05-11 ENCOUNTER — VIRTUAL VISIT (OUTPATIENT)
Dept: PSYCHIATRY | Facility: CLINIC | Age: 46
End: 2021-05-11
Attending: NURSE PRACTITIONER
Payer: COMMERCIAL

## 2021-05-11 DIAGNOSIS — F90.8 ATTENTION DEFICIT HYPERACTIVITY DISORDER (ADHD), OTHER TYPE: Primary | ICD-10-CM

## 2021-05-11 PROCEDURE — 99213 OFFICE O/P EST LOW 20 MIN: CPT | Mod: GT | Performed by: NURSE PRACTITIONER

## 2021-05-11 NOTE — TELEPHONE ENCOUNTER
Social Work   Incoming/Outgoing Call  [facility]    Incoming From: Mason Crowe  Callback number: [phone]    Reason for Call:  Mason called me back after I had left her a voicemail offering a therapy referral.    Response/Plan:  I provided Mason with the contact information for [facility] in [place].    Will route to patient's current psychiatric provider(s) as an FYI.   Please call or EPIC message with any questions or concerns.    Sherita Irene,  MSW, [facility]  [phone]

## 2021-05-11 NOTE — TELEPHONE ENCOUNTER
On May 11, 2021, at 2:46 PM, writer called patient at [phone] to confirm Virtual Visit. Writer unable to make contact with patient. Writer left detailed voice message for call back. [phone] left as call back number. Ashlyn Isaac, [facility]
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
## 2021-05-17 DIAGNOSIS — F33.1 MODERATE EPISODE OF RECURRENT MAJOR DEPRESSIVE DISORDER (H): ICD-10-CM

## 2021-05-18 ENCOUNTER — TELEPHONE (OUTPATIENT)
Dept: PSYCHIATRY | Facility: CLINIC | Age: 46
End: 2021-05-18

## 2021-05-18 RX ORDER — SERTRALINE HYDROCHLORIDE 100 MG/1
TABLET, FILM COATED ORAL
Qty: 45 TABLET | Refills: 1 | OUTPATIENT
Start: 2021-05-18

## 2021-05-18 NOTE — TELEPHONE ENCOUNTER
Called pharmacy and gave a verbal order based on previous prescription sent with one refill.  Refill seems to not have been entered on pharmacy end.    Called patient and relayed the above information.

## 2021-05-18 NOTE — TELEPHONE ENCOUNTER
----- Message from [place] sent at 5/18/2021  4:09 PM CDT -----  Regarding: Rx Refill - [place]  Contact: [phone]  [facility] Call Center    Phone Message    May a detailed message be left on voicemail: yes     Reason for Call: Medication Refill Request    Has the patient contacted the pharmacy for the refill? Yes   Name of medication being requested: Zoloft  Provider who prescribed the medication: [place]  Pharmacy: [facility]  Date medication is needed: about 1 week    Pt states that the pharmacy would not approve the medication refill. Requesting a call back.      Action Taken: Message routed to:  Other: nursing    Travel Screening: Not Applicable

## 2021-05-29 DIAGNOSIS — F41.9 ANXIETY: ICD-10-CM

## 2021-06-02 RX ORDER — HYDROXYZINE HYDROCHLORIDE 25 MG/1
25-50 TABLET, FILM COATED ORAL EVERY 8 HOURS PRN
Qty: 180 TABLET | Refills: 0 | Status: SHIPPED | OUTPATIENT
Start: 2021-06-02 | End: 2021-07-01

## 2021-06-02 NOTE — TELEPHONE ENCOUNTER
Medication requested: hydrOXYzine (ATARAX) 25 MG tablet Take 1-2 tablets (25-50 mg) by mouth every 8 hours as needed for itching or anxiety   Last refilled: 4/30/21  Qty: 180/0      Last seen: 5/11/2021  RTC: 1 month  Cancel: 0  No-show: 0  Next appt: None    Refill decision:   Refilled for 30 days per protocol.

## 2021-06-09 ENCOUNTER — VIRTUAL VISIT (OUTPATIENT)
Dept: PSYCHIATRY | Facility: CLINIC | Age: 46
End: 2021-06-09
Attending: NURSE PRACTITIONER
Payer: COMMERCIAL

## 2021-06-09 DIAGNOSIS — F90.8 ATTENTION DEFICIT HYPERACTIVITY DISORDER (ADHD), OTHER TYPE: ICD-10-CM

## 2021-06-09 DIAGNOSIS — F41.9 ANXIETY: ICD-10-CM

## 2021-06-09 DIAGNOSIS — F33.1 MODERATE EPISODE OF RECURRENT MAJOR DEPRESSIVE DISORDER (H): ICD-10-CM

## 2021-06-09 PROCEDURE — 99213 OFFICE O/P EST LOW 20 MIN: CPT | Mod: GT | Performed by: NURSE PRACTITIONER

## 2021-06-09 RX ORDER — SERTRALINE HYDROCHLORIDE 100 MG/1
100 TABLET, FILM COATED ORAL DAILY
Qty: 30 TABLET | Refills: 0 | Status: SHIPPED | OUTPATIENT
Start: 2021-06-09 | End: 2021-08-26

## 2021-06-09 RX ORDER — SERTRALINE HYDROCHLORIDE 100 MG/1
150 TABLET, FILM COATED ORAL DAILY
Qty: 45 TABLET | Refills: 1 | Status: SHIPPED | OUTPATIENT
Start: 2021-06-09 | End: 2021-06-09

## 2021-06-09 RX ORDER — ATOMOXETINE 80 MG/1
80 CAPSULE ORAL DAILY
Qty: 30 CAPSULE | Refills: 1 | Status: SHIPPED | OUTPATIENT
Start: 2021-06-09 | End: 2021-08-26

## 2021-06-09 ASSESSMENT — PAIN SCALES - GENERAL: PAINLEVEL: NO PAIN (0)

## 2021-06-09 NOTE — PATIENT INSTRUCTIONS
**For crisis resources, please see the information at the end of this document**     Patient Education      Thank you for coming to the [facility] [place] [facility].    Lab Testing:  If you had lab testing today and your results are reassuring or normal they will be mailed to you or sent through [employer] within 7 days. If the lab tests need quick action we will call you with the results. The phone number we will call with results is # [phone] (home) . If this is not the best number please call our clinic and change the number.    Medication Refills:  If you need any refills please call your pharmacy and they will contact us. Our fax number for refills is [phone]. Please allow three business for refill processing. If you need to  your refill at a new pharmacy, please contact the new pharmacy directly. The new pharmacy will help you get your medications transferred.     Scheduling:  If you have any concerns about today's visit or wish to schedule another appointment please call our office during normal business hours [phone] (8-5:00 M-F)    Contact Us:  Please call [phone] during business hours (8-5:00 M-F).  If after clinic hours, or on the weekend, please call  [phone].    Financial Assistance [phone]  [facility] Billing [phone]  Central Billing Office, [facility]: [phone]  [facility] Billing [phone]  Medical Records [phone]  [facility] Patient Bill of Rights [email or web address]       MENTAL HEALTH CRISIS NUMBERS:  For a medical emergency please call  911 or go to the nearest ER.     [facility]:   [facility] -[phone]   Crisis Residence [facility] -[phone]   Walk-In Counseling Center [facility] -[phone]   COPE 24/7 [facility] Mobile Team -[phone] (adults)/[phone] (child)  CHILD: [facility] needs assessment  team - [phone]      [facility]:   [facility] - [phone]   Walk-in counseling [facility] - [phone]   Walk-in counseling [facility] - [phone]   Crisis Residence [facility] - [phone]  Urgent Care Adult Mental Bpbiqp-[phone] mobile unit/ 24/7 crisis line    National Crisis Numbers:   National Suicide Prevention Lifeline: 8-024-836-TALK (901-112-2797)  Poison Control Center - 8-021-402-2576  Nefsis/resources for a list of additional resources (SOS)  Trans Lifeline a hotline for transgender people 1-673.246.9859  The Minh Project a hotline for LGBT youth 9-418-754-1014  Crisis Text Line: For any crisis 24/7   To: 637556  see www.crisistextline.org  - IF MAKING A CALL FEELS TOO HARD, send a text!         Again thank you for choosing [facility] and please let us know how we can best partner with you to improve you and your family's health.    You may be receiving a survey regarding this appointment. We would love to have your feedback, both positive and negative. The survey is done by an external company, so your answers are anonymous.
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
## 2021-06-30 DIAGNOSIS — F33.1 MODERATE EPISODE OF RECURRENT MAJOR DEPRESSIVE DISORDER (H): ICD-10-CM

## 2021-06-30 DIAGNOSIS — F41.9 ANXIETY: ICD-10-CM

## 2021-07-01 RX ORDER — GABAPENTIN 400 MG/1
800 CAPSULE ORAL 3 TIMES DAILY
Qty: 180 CAPSULE | Refills: 1 | Status: SHIPPED | OUTPATIENT
Start: 2021-07-01 | End: 2021-08-26

## 2021-07-01 RX ORDER — HYDROXYZINE HYDROCHLORIDE 25 MG/1
25-50 TABLET, FILM COATED ORAL EVERY 8 HOURS PRN
Qty: 180 TABLET | Refills: 0 | Status: SHIPPED | OUTPATIENT
Start: 2021-07-01 | End: 2021-07-27

## 2021-07-01 NOTE — TELEPHONE ENCOUNTER
Medication requested: gabapentin (NEURONTIN) 400 MG capsule,   Last refilled: 4/6/21  Qty: 180/1    Medication requested: hydrOXYzine (ATARAX) 25 MG tablet  Last refilled: 6/2/21  Qty: 180    Last seen: 6/9/21  RTC: ?  Cancel: 0  No-show: 0  Next appt: 0    Refill decision:   Refill pended and routed to the provider for review/determination due to   Last note is not complete

## 2021-07-02 ENCOUNTER — TELEPHONE (OUTPATIENT)
Dept: PSYCHIATRY | Facility: CLINIC | Age: 46
End: 2021-07-02

## 2021-07-02 RX ORDER — HYDROXYZINE HYDROCHLORIDE 25 MG/1
25-50 TABLET, FILM COATED ORAL EVERY 8 HOURS PRN
Qty: 18 TABLET | Refills: 0 | Status: CANCELLED | OUTPATIENT
Start: 2021-07-02 | End: 2021-07-08

## 2021-07-02 RX ORDER — SERTRALINE HYDROCHLORIDE 100 MG/1
100 TABLET, FILM COATED ORAL DAILY
Qty: 6 TABLET | Refills: 0 | Status: CANCELLED | OUTPATIENT
Start: 2021-07-02 | End: 2021-07-08

## 2021-07-03 NOTE — TELEPHONE ENCOUNTER
Telephone Call with Mason Crowe    Start time: 6:49 PM  End time: 6:55 PM    Received call from patient that she was unable to refill sertraline and hydroxyzine when her nursed visited the pharmacy. Patient states that she is low on meds (out of sertraline and 2 pills left of hydroxyzine) and requesting refill.     Chart reviewed: sertraline 100mg and hydroxyzine 25mg marked as dispensed yesterday (7/1/21). Patient encouraged to call pharmacy to clarify med status and a very short supply could be prescribed if needed to last until after the holiday weekend.    On phone follow up, Ms. Crowe reports that medications are available at [facility]. She has arranged pickup of requested prescriptions. No medications were prescribed by on-call psychiatrist

## 2021-07-21 ENCOUNTER — TELEPHONE (OUTPATIENT)
Dept: PSYCHIATRY | Facility: CLINIC | Age: 46
End: 2021-07-21

## 2021-07-21 NOTE — TELEPHONE ENCOUNTER
[facility] Call Center    Phone Message    May a detailed message be left on voicemail: yes     Reason for Call: Medication Refill Request    Has the patient contacted the pharmacy for the refill? Yes   Name of medication being requested: hydroxazine  Provider who prescribed the medication: Nelson Waddell  Pharmacy:  Did not specify    Date medication is needed: asap, pt is out of medication and was scheduled with Nelson today, but that was cancelled. Pt is rescheduled for next week.    Action Taken: Message routed to:  Other: nursing pool    Travel Screening: Not Applicable

## 2021-07-21 NOTE — TELEPHONE ENCOUNTER
Received RF request from patient     Last seen: 6/9/21  RTC: 1 month  Cancel: 7/21/21 - cancelled by clinic  No-show: none  Next appt: 7/27/21     Medication requested: hydrOXYzine (ATARAX) 25 MG tablet  Directions: Take 1-2 tablets (25-50 mg) by mouth every 8 hours as needed for itching or anxiety   Qty: 180  Last Rx written 7/1/21 for 180 tabs       Plan per 6/9/21 virtual visit:    Continue Gabapentin 800mg TID for alcohol cravings  Continue Hydroxyzine 25-50mg TID PRN for anxiety and itching.    Continue Zoloft 150mg for depression management  Continue Strattera 80mg daily for attention deficits.  Continue NAC 600mg BID for cravings and picking behavior     Per outside meds tab, hydroxyzine was last dispensed on 7/1/21 for 180 capsules/30 ds. Called the pharmacy to find out when the Rx was picked up. The pharmacist confirmed that the Rx was picked up on 7/2, along with sertraline. The soonest that she will be able to get a refill approved by insurance is 8/1.    Called patient to let her know the above. She explained that she still has enough medication to last a week (definitely enough to last until her appointment on 7/27/21) but is requesting a week early because the pharmacy is very busy and takes a week to fill her medications and get them mailed to her. She said that she has been getting her meds mailed because she wants to avoid going outside if at all possible to limit exposure to Covid.    Routed to ERNIE Aaron, for FYI.

## 2021-07-26 DIAGNOSIS — F41.9 ANXIETY: ICD-10-CM

## 2021-07-27 ENCOUNTER — VIRTUAL VISIT (OUTPATIENT)
Dept: PSYCHIATRY | Facility: CLINIC | Age: 46
End: 2021-07-27
Attending: NURSE PRACTITIONER
Payer: COMMERCIAL

## 2021-07-27 DIAGNOSIS — F41.9 ANXIETY: ICD-10-CM

## 2021-07-27 DIAGNOSIS — F90.8 ATTENTION DEFICIT HYPERACTIVITY DISORDER (ADHD), OTHER TYPE: ICD-10-CM

## 2021-07-27 DIAGNOSIS — F33.1 MODERATE EPISODE OF RECURRENT MAJOR DEPRESSIVE DISORDER (H): ICD-10-CM

## 2021-07-27 PROCEDURE — 99214 OFFICE O/P EST MOD 30 MIN: CPT | Mod: GT | Performed by: NURSE PRACTITIONER

## 2021-07-27 RX ORDER — ROSUVASTATIN CALCIUM 20 MG/1
20 TABLET, COATED ORAL DAILY
COMMUNITY
End: 2022-04-18

## 2021-07-27 RX ORDER — HYDROXYZINE HYDROCHLORIDE 25 MG/1
25-50 TABLET, FILM COATED ORAL EVERY 8 HOURS PRN
Qty: 180 TABLET | Refills: 1 | Status: SHIPPED | OUTPATIENT
Start: 2021-07-27 | End: 2021-08-30

## 2021-07-27 ASSESSMENT — PAIN SCALES - GENERAL: PAINLEVEL: NO PAIN (0)
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
## 2021-07-27 NOTE — PATIENT INSTRUCTIONS
**For crisis resources, please see the information at the end of this document**     Patient Education      Thank you for coming to the [facility].    Lab Testing:  If you had lab testing today and your results are reassuring or normal they will be mailed to you or sent through [employer] within 7 days. If the lab tests need quick action we will call you with the results. The phone number we will call with results is # [phone] (home) . If this is not the best number please call our clinic and change the number.    Medication Refills:  If you need any refills please call your pharmacy and they will contact us. Our fax number for refills is [phone]. Please allow three business for refill processing. If you need to  your refill at a new pharmacy, please contact the new pharmacy directly. The new pharmacy will help you get your medications transferred.     Scheduling:  If you have any concerns about today's visit or wish to schedule another appointment please call our office during normal business hours [phone] (8-5:00 M-F)    Contact Us:  Please call [phone] during business hours (8-5:00 M-F).  If after clinic hours, or on the weekend, please call  [phone].    Financial Assistance [phone]  [facility] Billing [phone]  Central Billing Office, [facility]: [phone]  [facility]illing [phone]  Medical Records [phone]  [facility] Patient Bill of Rights [email or web address]       MENTAL HEALTH CRISIS NUMBERS:  For a medical emergency please call  911 or go to the nearest ER.     [facility]:   [facility] -[phone]   Crisis Residence [facility]idence -[phone]   Walk-In Counseling Center [facility] [place] [facility] -[phone]   COPE 24/7 [place] Mobile Team -[phone] (adults)/[phone] (child)  CHILD: [facility] needs assessment  team - [phone]      [facility]:   [facility] - [phone]   Walk-in counseling [facility] - [phone]   Walk-in counseling [facility] - [phone]   Crisis Residence [facility] Residence - [phone]  Urgent Care Adult Mental [facility]-[phone] mobile unit/ 24/7 crisis line    National Crisis Numbers:   National Suicide Prevention Lifeline: 7-666-766-TALK (010-818-2436)  Poison Control Center - 8-095-059-8524  [facility]/resources for a list of additional resources (SOS)  Trans Lifeline a hotline for transgender people 1-784.534.1747  The [employer] a hotline for LGBT youth 2-271-749-9192  Crisis Text Line: For any crisis 24/7   To: 726115  see www.crisistextline.org  - IF MAKING A CALL FEELS TOO HARD, send a text!         Again thank you for choosing [facility] and please let us know how we can best partner with you to improve you and your family's health.    You may be receiving a survey regarding this appointment. We would love to have your feedback, both positive and negative. The survey is done by an external company, so your answers are anonymous.

## 2021-07-28 RX ORDER — HYDROXYZINE HYDROCHLORIDE 25 MG/1
TABLET, FILM COATED ORAL
Qty: 180 TABLET | Refills: 0 | OUTPATIENT
Start: 2021-07-28

## 2021-08-04 ENCOUNTER — VIRTUAL VISIT (OUTPATIENT)
Dept: PHARMACY | Facility: CLINIC | Age: 46
End: 2021-08-04
Attending: NURSE PRACTITIONER
Payer: COMMERCIAL

## 2021-08-04 DIAGNOSIS — Z78.9 TAKES DIETARY SUPPLEMENTS: ICD-10-CM

## 2021-08-04 DIAGNOSIS — F32.0 MILD MAJOR DEPRESSION (H): Primary | ICD-10-CM

## 2021-08-04 DIAGNOSIS — G44.209 TENSION HEADACHE: ICD-10-CM

## 2021-08-04 DIAGNOSIS — E78.5 HYPERLIPIDEMIA LDL GOAL <100: ICD-10-CM

## 2021-08-04 DIAGNOSIS — R35.0 URINARY FREQUENCY: ICD-10-CM

## 2021-08-04 DIAGNOSIS — L29.9 ITCHING: ICD-10-CM

## 2021-08-04 DIAGNOSIS — K59.00 CONSTIPATION, UNSPECIFIED CONSTIPATION TYPE: ICD-10-CM

## 2021-08-04 PROCEDURE — 99605 MTMS BY PHARM NP 15 MIN: CPT | Performed by: PHARMACIST

## 2021-08-04 PROCEDURE — 99607 MTMS BY PHARM ADDL 15 MIN: CPT | Performed by: PHARMACIST

## 2021-08-04 RX ORDER — CHOLECALCIFEROL (VITAMIN D3) 50 MCG
3 TABLET ORAL DAILY
COMMUNITY
End: 2022-02-10 | Stop reason: DRUGHIGH

## 2021-08-04 RX ORDER — MULTIPLE VITAMINS W/ MINERALS TAB 9MG-400MCG
1 TAB ORAL DAILY
COMMUNITY
End: 2023-08-14

## 2021-08-04 RX ORDER — FERROUS SULFATE 325(65) MG
325 TABLET ORAL
COMMUNITY
End: 2023-08-14

## 2021-08-04 RX ORDER — TRIAMCINOLONE ACETONIDE 1 MG/G
1 CREAM TOPICAL 2 TIMES DAILY
COMMUNITY
End: 2023-08-14

## 2021-08-04 RX ORDER — OXYBUTYNIN CHLORIDE 5 MG/1
5 TABLET ORAL 2 TIMES DAILY
COMMUNITY

## 2021-08-04 NOTE — PATIENT INSTRUCTIONS
Recommendations from today's MTM visit:                                                    MTM (medication therapy management) is a service provided by a clinical pharmacist designed to help you get the most of out of your medicines.   Today we reviewed what your medicines are for, how to know if they are working, that your medicines are safe and how to make your medicine regimen as easy as possible.      1.  moisturizing cream such as CeraVe, Vanicream, or Vaseline  2. Okay to take senna/docusate 1 tablet daily if needed for constipation    Follow-up: I will call you on 10/27/2021 at 1:30 PM  It was great to speak with you today.  I value your experience and would be very thankful for your time with providing feedback on our clinic survey. You may receive a survey via email or text message in the next few days.     To schedule another MTM appointment, please call the clinic directly or you may call the MTM scheduling line at [phone] or toll-free at [phone].     My Clinical Pharmacist's contact information:                                                      Please feel free to contact me with any questions or concerns you have.      Audrey Hurtado, PharmD  Medication Therapy Management Pharmacist  [facility]  Phone: [phone]

## 2021-08-04 NOTE — PROGRESS NOTES
Medication Therapy Management (MTM) Encounter    ASSESSMENT:                            Medication Adherence/Access: No issues identified    Depression: Stable. Continue current medication..    Itching: Reviewed dermatology recommendations of using CeraVe cream, Vanicream, or Vaseline and patient will  one of those products, utilizing triamcinolone as needed for severe itching.  No one medication sticks out as likely to cause itching.  Verapamil and statins were the only medication started prior to when the itching began, though itching did not develop until 10 years later, so unlikely to be related to an allergy to those medications.  Reviewed possibility that duplicate anticholinergics may be causing some dry skin that could contribute to itching, but again not a likely primary source of the problem.  Recommended continuing with current plan seeing gastroenterology on 9/20/2021 for assessment and treatment recommendations, as other sources of itching are not easily identified.  Each of sertraline, verapamil, and pravastatin do have a less than 2% reported incidence of elevated serum transaminases, will do such labs are within normal limits.    Headaches: Stable. Continue current medication.   Hyperlipidemia: Stable.  Patient will finish out the rest of the week with pravastatin supply and switch to rosuvastatin starting next week.    Overactive Bladder: Discussed duplicate action of to anticholinergics that may be contributing to constipation and dry mouth.  Patient will discuss with urology today.     Constipation: As above, discussed how multiple anticholinergic medications may be contributing to constipation.  His symptoms predate duplicate anticholinergics discussed trying senna with docusate once daily as needed-patient agreed.  Supplements: Stable. Continue current medication.    PLAN                            1.  Patient will purchase moisturizing cream, as outlined amounts.  2. Follow up with  gastroenterology on 2021  3.  Patient will take senna/docusate 1 tablet daily if needed for constipation.    Follow-up: after GI in October    SUBJECTIVE/OBJECTIVE:                          Mason Crowe is a 46 year old female called for an initial visit. She was referred to me from Nelson KLEIN CNP.      Reason for visit: med review- could meds be impacting itching?    Allergies/ADRs: Reviewed in chart  Past Medical History: Reviewed in chart  Tobacco: She reports that she has been smoking. She has never used smokeless tobacco.Tobacco Cessation Action Plan:   Information offered: Patient not interested at this time    Alcohol: history of alcohol dependence; none x 3 years  Started drinking alcohol ; increased  when son went to [employer]; none [id number]; relapsed , stopped 2018  Gastroenterology consult with Yarsani end of September    Medication Adherence/Access: no issues reported    Depression: Patient is currently prescribed sertraline 100 mg once daily, atomoxetine 80 mg once daily, gabapentin 800 mg 3 times daily, and  mg twice daily.   Patient sees Nelson KLEIN CNP in [facility] psychiatry clinic with last visit on 2021, at which time no medication changes were made.  Per that note, mood and anxiety are stable, though patient continued to report irritability.  She continued to endorse chronic itching, which can increase her irritability.  Today, she reported much of the same, with relatively stable mood.  Denied known side effects from her medications.     Itching: Patient reported that itching started sometime between  and  as she recalled that she was scared she would be itching throughout her father's  in .  As above, she drinking heavily [id number] and [id number] and wonders if liver damage related to alcohol may be causing her itching. Per notes, she has had elevated alkaline phosphatase and evidence of liver fibrosis that had been considered as  "possible cause of itching from years of high alcohol intake.  She recently [facility] dermat[facility] through [facility] who made recommendation for soaking baths and cleansers/moisturizers.  That provider consider checking bile acid labs and patient was to discuss with GI provider.  They also discussed possible phototherapy.  Today patient reported that liver ultrasound has been unremarkable but that \"liver labs\" are higher now than when she was drinking.  She does take the hydroxyzine 50 mg scheduled three times daily and is unsure if it is helpful.     She has tried Eucerin, Aveeno, \"and all the other lotions\" and most are not very helpful.  She has not yet picked up a new cream, as recommended by dermatology but did start using triamcinolone per their recommendation, which has been mildly helpful.  She reported that itching occurs from head to hips and comes in waves.  Once an itching episode starts, no treatment is effective and they occur 1-2 times per day, each lasting as long as an hour.  Pt started taking verapamil and simvastatin in 2001 and the latter was then changed to pravastatin. Itching has gotten worse over time.  Most of her other current medications were restarted after she stopped drinking.    Since 6/2018, which is right before patient stopped drinking, AST/ALT have reduced to low end of normal range and alkaline phosphatase has increased from 106 in 8/2018,  in 3/2019 and 173 on 4/2021.    Headaches: Pt takes acetaminophen 1000mg about once daily and alternates with naproxen and ibuprofen.  Each of the medications is helpful and tries to alternate to avoid too much impact on liver and kidney function.     Hyperlipidemia: Current therapy includes pravastatin 20mg daily.  Patient reports no significant myalgias or other side effects. She is finishing up pravastatin in the next week and switching to rosuvastatin for improved benefit \"for a blockage.\"  The ASCVD Risk score (Compton DC Jr., et al., " "2013) failed to calculate for the following reasons:    The systolic blood pressure is missing    Cannot find a previous HDL lab    Cannot find a previous total cholesterol lab  4/22/21 (Care Everywhere):  Total chol 166  Trig 106  LDL 95  HDL 50    Overactive Bladder: Pt takes oxybutynin 5mg BID and trospium ER 80mg at noon, which she reported works well.  Denied side effects. She stated that she only has about a week left of trospium.  She had it leftover from prior prescription and started taking it due to ineffective treatment with oxybutynin and has reduced urinary frequency.  She has follow up with Urology this afternoon.  She endorsed having bowel movement once every few days and frequent dry mouth, though relates the latter to gabapentin.     Constipation: Pt reported that she has bowel movement every few days, which has occurred for \"awhile.\" She had used \"a powder\" for a few months, but was not helpful. She would like to try a medication.    Supplements: Patient taking multivitamin once daily, ferrous sulfate 325 mg once daily, B complex once daily, B12 1000 mcg once daily on weekdays and vitamin D 2000 units once daily. Patient denied side effects  Vitamin D level 4/22/2021: 19 NG/mL, which is when supplement was started.    ----------------      I spent 55 minutes with this patient today. A copy of the visit note was provided to the patient's primary care and referring provider.    The patient was given a summary of these recommendations.     Audrey Hurtado, PharmD  Medication Therapy Management Pharmacist  [facility]  Phone: [phone]    Telemedicine Visit Details  Type of service:  Telephone visit  Start Time: 1:35pm  End Time: 2:30 PM  Originating Location (patient location): Home  Distant Location (provider location):  [facility]     Medication Therapy Recommendations  Constipation, unspecified constipation type    " Rationale: Untreated condition - Needs additional medication therapy - Indication   Recommendation: Start Medication - pt will take senna/docusate once daily as needed   Status: Accepted - no CPA Needed         Itching    Rationale: Does not understand instructions - Adherence - Adherence   Recommendation: Provide Education - pt will  moisturizing cream, per dermatology referral   Status: Patient Agreed - Adherence/Education

## 2021-08-04 NOTE — Clinical Note
Hi-  No obvious causes of itching that I can find.  See note as som and let me know if you have questions!  Audrey

## 2021-08-14 ENCOUNTER — HEALTH MAINTENANCE LETTER (OUTPATIENT)
Age: 46
End: 2021-08-14

## 2021-08-24 DIAGNOSIS — F33.1 MODERATE EPISODE OF RECURRENT MAJOR DEPRESSIVE DISORDER (H): ICD-10-CM

## 2021-08-26 ENCOUNTER — VIRTUAL VISIT (OUTPATIENT)
Dept: PSYCHIATRY | Facility: CLINIC | Age: 46
End: 2021-08-26
Attending: NURSE PRACTITIONER
Payer: COMMERCIAL

## 2021-08-26 DIAGNOSIS — F90.8 ATTENTION DEFICIT HYPERACTIVITY DISORDER (ADHD), OTHER TYPE: ICD-10-CM

## 2021-08-26 DIAGNOSIS — F33.1 MODERATE EPISODE OF RECURRENT MAJOR DEPRESSIVE DISORDER (H): ICD-10-CM

## 2021-08-26 DIAGNOSIS — F41.9 ANXIETY: ICD-10-CM

## 2021-08-26 DIAGNOSIS — G47.00 INSOMNIA, UNSPECIFIED TYPE: Primary | ICD-10-CM

## 2021-08-26 PROCEDURE — 99213 OFFICE O/P EST LOW 20 MIN: CPT | Mod: TEL | Performed by: NURSE PRACTITIONER

## 2021-08-26 RX ORDER — GABAPENTIN 400 MG/1
800 CAPSULE ORAL 3 TIMES DAILY
Qty: 180 CAPSULE | Refills: 1 | Status: SHIPPED | OUTPATIENT
Start: 2021-08-26 | End: 2021-09-01

## 2021-08-26 RX ORDER — TRAZODONE HYDROCHLORIDE 50 MG/1
50 TABLET, FILM COATED ORAL
Qty: 30 TABLET | Refills: 0 | Status: SHIPPED | OUTPATIENT
Start: 2021-08-26 | End: 2021-09-01

## 2021-08-26 RX ORDER — SERTRALINE HYDROCHLORIDE 100 MG/1
100 TABLET, FILM COATED ORAL DAILY
Qty: 30 TABLET | Refills: 0 | Status: SHIPPED | OUTPATIENT
Start: 2021-08-26 | End: 2021-09-01

## 2021-08-26 RX ORDER — ATOMOXETINE 80 MG/1
80 CAPSULE ORAL DAILY
Qty: 30 CAPSULE | Refills: 1 | Status: SHIPPED | OUTPATIENT
Start: 2021-08-26 | End: 2021-09-01

## 2021-08-26 ASSESSMENT — PAIN SCALES - GENERAL: PAINLEVEL: NO PAIN (0)
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
## 2021-08-26 NOTE — PATIENT INSTRUCTIONS
**For crisis resources, please see the information at the end of this document**     Patient Education      Thank you for coming to the [facility] [place] [facility].    Lab Testing:  If you had lab testing today and your results are reassuring or normal they will be mailed to you or sent through [employer] within 7 days. If the lab tests need quick action we will call you with the results. The phone number we will call with results is # [phone] (home) . If this is not the best number please call our clinic and change the number.    Medication Refills:  If you need any refills please call your pharmacy and they will contact us. Our fax number for refills is [phone]. Please allow three business for refill processing. If you need to  your refill at a new pharmacy, please contact the new pharmacy directly. The new pharmacy will help you get your medications transferred.     Scheduling:  If you have any concerns about today's visit or wish to schedule another appointment please call our office during normal business hours [phone] (8-5:00 M-F)    Contact Us:  Please call [phone] during business hours (8-5:00 M-F).  If after clinic hours, or on the weekend, please call  [phone].    Financial Assistance [phone]  [facility] Billing [phone]  Central Billing Office, [facility]: [phone]  [facility] Billing [phone]  Medical Records [phone]  [facility] Patient Bill of Rights [email or web address]       MENTAL HEALTH CRISIS NUMBERS:  For a medical emergency please call  911 or go to the nearest ER.     [facility]:   [facility] -[phone]   Crisis Residence [facility] Residence -[phone]   Walk-In Counseling Center [facility] -[phone]   COPE 24/7 [facility] Mobile Team -[phone] (adults)/[phone] (child)  CHILD: [facility] needs assessment  team - [phone]      [facility]:   [facility] - [phone]   Walk-in counseling [facility] - [phone]   Walk-in counseling [facility] - [phone]   Crisis Residence [facility] Residence - [phone]  Urgent Care Adult Mental O[facility]-[phone] mobile unit/ 24/7 crisis line    National Crisis Numbers:   National Suicide Prevention Lifeline: 9-202-705-TALK (185-405-5566)  Poison Control Center - 0-453-793-3887  [facility]/resources for a list of additional resources (SOS)  Trans Lifeline a hotline for transgender people 1-427.307.9708  [employer] a hotline for LGBT youth 1-516-592-6007  Crisis Text Line: For any crisis 24/7   To: 690245  see www.crisistextline.org  - IF MAKING A CALL FEELS TOO HARD, send a text!         Again thank you for choosing [facility] MENTAL HEALTH & ADDICTION [facility] and please let us know how we can best partner with you to improve you and your family's health.    You may be receiving a survey regarding this appointment. We would love to have your feedback, both positive and negative. The survey is done by an external company, so your answers are anonymous.

## 2021-08-27 RX ORDER — SERTRALINE HYDROCHLORIDE 100 MG/1
100 TABLET, FILM COATED ORAL DAILY
Qty: 30 TABLET | Refills: 0 | OUTPATIENT
Start: 2021-08-27

## 2021-08-30 RX ORDER — HYDROXYZINE HYDROCHLORIDE 25 MG/1
TABLET, FILM COATED ORAL
Qty: 180 TABLET | Refills: 0 | Status: SHIPPED | OUTPATIENT
Start: 2021-08-30 | End: 2021-11-05

## 2021-08-30 NOTE — TELEPHONE ENCOUNTER
Medication requested: HYDROXYZINE HCL 25MG TABS (WHITE)   Last refilled: 7/27/21  Qty: 180/1      Last seen: 8/26/21  RTC: 1 month  Cancel: 0  No-show: 0  Next appt: 0    Refill decision:   Refilled for 30 days per protocol.

## 2021-09-01 ENCOUNTER — TELEPHONE (OUTPATIENT)
Dept: PSYCHIATRY | Facility: CLINIC | Age: 46
End: 2021-09-01

## 2021-09-01 DIAGNOSIS — F33.1 MODERATE EPISODE OF RECURRENT MAJOR DEPRESSIVE DISORDER (H): ICD-10-CM

## 2021-09-01 DIAGNOSIS — F90.8 ATTENTION DEFICIT HYPERACTIVITY DISORDER (ADHD), OTHER TYPE: ICD-10-CM

## 2021-09-01 DIAGNOSIS — G47.00 INSOMNIA, UNSPECIFIED TYPE: ICD-10-CM

## 2021-09-01 RX ORDER — TRAZODONE HYDROCHLORIDE 50 MG/1
50 TABLET, FILM COATED ORAL
Qty: 30 TABLET | Refills: 1 | Status: SHIPPED | OUTPATIENT
Start: 2021-09-01 | End: 2021-11-24

## 2021-09-01 RX ORDER — GABAPENTIN 400 MG/1
800 CAPSULE ORAL 3 TIMES DAILY
Qty: 180 CAPSULE | Refills: 1 | Status: SHIPPED | OUTPATIENT
Start: 2021-09-01 | End: 2021-11-24

## 2021-09-01 RX ORDER — SERTRALINE HYDROCHLORIDE 100 MG/1
100 TABLET, FILM COATED ORAL DAILY
Qty: 30 TABLET | Refills: 1 | Status: SHIPPED | OUTPATIENT
Start: 2021-09-01 | End: 2021-10-12

## 2021-09-01 RX ORDER — ATOMOXETINE 80 MG/1
80 CAPSULE ORAL DAILY
Qty: 30 CAPSULE | Refills: 1 | Status: SHIPPED | OUTPATIENT
Start: 2021-09-01 | End: 2021-11-24

## 2021-09-01 NOTE — TELEPHONE ENCOUNTER
Eda Snell Laura, RN  Phone Number:  [phone] (Call me)   Cas, called to check the status on her refill request from her last visit with [place] Munoz, on 08/26/21 for Zoloft and Sertraline. She was not sure of how many mg's they were.     Thanks!

## 2021-09-01 NOTE — TELEPHONE ENCOUNTER
Writer reviewed pt's chart. Appears all medications were sent to an out of state [facility] on 8/26.    Called the pt and confirmed she wants her medications sent to the local [facility] on [address]. Writer agreed to resend all of thesse.

## 2021-09-14 ENCOUNTER — TELEPHONE (OUTPATIENT)
Dept: PSYCHIATRY | Facility: CLINIC | Age: 46
End: 2021-09-14

## 2021-09-14 NOTE — TELEPHONE ENCOUNTER
On September 14, 2021, at 1:20 PM, writer called patient at mobile [phone] to confirm Virtual Visit. Writer unable to make contact with patient. Writer left detailed voice message for call back. [phone] left as call back number. Hyun Jesus, EMT

## 2021-09-14 NOTE — TELEPHONE ENCOUNTER
Writer called pt back at mobile at 1:52pm to confirm virtual visit. Pt requested to reschedule appointment and writer instructed her to call back  to reschedule. Hyun Jesus, EMT

## 2021-09-15 ENCOUNTER — TELEPHONE (OUTPATIENT)
Dept: PSYCHIATRY | Facility: CLINIC | Age: 46
End: 2021-09-15

## 2021-09-15 NOTE — TELEPHONE ENCOUNTER
"Returned the pt's phone call. She reports increasing depression since the last visit on 8/26. The pt said, \" I can't take my head [id number]. I'm sick of my mind being like this and the itching.\"    Pt confirmed she's still taking Zoloft 100 mg daily and said this was recently decreased with a plan to possibly start something else. She would like to start the switch if possible. The pt reports her symptoms have worsened since she last met with [place]. She cannot think of any contributing factors.    At this time, the pt denies SI/SIB or other safety concerns, but is worried things will eventually escalate to that point if she doesn't get some help.    Agreed to reach out to the provider for recommendations. Will then call her back.  "

## 2021-09-15 NOTE — TELEPHONE ENCOUNTER
[facility] Call Center    Phone Message    May a detailed message be left on voicemail: yes     Reason for Call: Other:   Patient called to reschedule her cancelled 9/14/21 appointment. Due to her new job (working 8a-5p) she scheduled the first available appointment after 5pm, which is in October.     Patient stated her depression has been bad and the zoloft is not working.      Action Taken: Message routed to:  Other: Kita Mayo RNCC    Travel Screening: Not Applicable

## 2021-09-15 NOTE — TELEPHONE ENCOUNTER
[place] Waddell APRN CNP Labossiere, Laura, RN  Caller: Unspecified (Today, 11:06 AM)  Dottie Kita,     Unfortunately, I don't have time today or tomorrow to do a chart review.  If she needs help, she should go to ED.  Another issue is her itching which has been impacting mental health.  I  am curious if she has followed up with her PCP about possible referrals to address the itching     thanks     FOLLOW UP:    LVM for patient requesting a call back regarding provider feedback.

## 2021-10-10 ENCOUNTER — HEALTH MAINTENANCE LETTER (OUTPATIENT)
Age: 46
End: 2021-10-10

## 2021-10-12 ENCOUNTER — TELEPHONE (OUTPATIENT)
Dept: PSYCHIATRY | Facility: CLINIC | Age: 46
End: 2021-10-12

## 2021-10-12 ENCOUNTER — VIRTUAL VISIT (OUTPATIENT)
Dept: PSYCHIATRY | Facility: CLINIC | Age: 46
End: 2021-10-12
Attending: NURSE PRACTITIONER
Payer: COMMERCIAL

## 2021-10-12 DIAGNOSIS — F90.8 ATTENTION DEFICIT HYPERACTIVITY DISORDER (ADHD), OTHER TYPE: ICD-10-CM

## 2021-10-12 DIAGNOSIS — F33.1 MODERATE EPISODE OF RECURRENT MAJOR DEPRESSIVE DISORDER (H): ICD-10-CM

## 2021-10-12 PROCEDURE — 99214 OFFICE O/P EST MOD 30 MIN: CPT | Mod: TEL | Performed by: NURSE PRACTITIONER

## 2021-10-12 RX ORDER — ESCITALOPRAM OXALATE 10 MG/1
TABLET ORAL
Qty: 30 TABLET | Refills: 0 | Status: SHIPPED | OUTPATIENT
Start: 2021-10-12 | End: 2021-11-24

## 2021-10-12 RX ORDER — SERTRALINE HYDROCHLORIDE 100 MG/1
TABLET, FILM COATED ORAL
Refills: 0 | COMMUNITY
Start: 2021-10-12 | End: 2021-11-24

## 2021-10-12 ASSESSMENT — PAIN SCALES - GENERAL: PAINLEVEL: NO PAIN (0)

## 2021-10-12 NOTE — TELEPHONE ENCOUNTER
On October 12, 2021, at 3:46 PM, writer called patient at mobile to confirm Virtual Visit. Writer unable to make contact with patient. Writer left detailed voice message for call back. [phone] left as call back number. Christopher Watson, EMT
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
## 2021-10-18 ENCOUNTER — VIRTUAL VISIT (OUTPATIENT)
Dept: PSYCHOLOGY | Facility: CLINIC | Age: 46
End: 2021-10-18
Attending: NURSE PRACTITIONER
Payer: COMMERCIAL

## 2021-10-18 DIAGNOSIS — F33.1 MAJOR DEPRESSIVE DISORDER, RECURRENT EPISODE, MODERATE (H): ICD-10-CM

## 2021-10-18 DIAGNOSIS — F41.1 GENERALIZED ANXIETY DISORDER: ICD-10-CM

## 2021-10-18 DIAGNOSIS — F88 DEVELOPMENTAL MENTAL DISORDER: Primary | ICD-10-CM

## 2021-10-18 PROCEDURE — 90834 PSYTX W PT 45 MINUTES: CPT | Mod: GT | Performed by: PSYCHOLOGIST

## 2021-10-18 ASSESSMENT — COLUMBIA-SUICIDE SEVERITY RATING SCALE - C-SSRS
2. HAVE YOU ACTUALLY HAD ANY THOUGHTS OF KILLING YOURSELF LIFETIME?: NO
1. IN THE PAST MONTH, HAVE YOU WISHED YOU WERE DEAD OR WISHED YOU COULD GO TO SLEEP AND NOT WAKE UP?: NO
1. IN THE PAST MONTH, HAVE YOU WISHED YOU WERE DEAD OR WISHED YOU COULD GO TO SLEEP AND NOT WAKE UP?: YES
2. HAVE YOU ACTUALLY HAD ANY THOUGHTS OF KILLING YOURSELF?: NO

## 2021-10-18 ASSESSMENT — ANXIETY QUESTIONNAIRES
6. BECOMING EASILY ANNOYED OR IRRITABLE: MORE THAN HALF THE DAYS
5. BEING SO RESTLESS THAT IT IS HARD TO SIT STILL: NEARLY EVERY DAY
GAD7 TOTAL SCORE: 15
7. FEELING AFRAID AS IF SOMETHING AWFUL MIGHT HAPPEN: SEVERAL DAYS
3. WORRYING TOO MUCH ABOUT DIFFERENT THINGS: NEARLY EVERY DAY
2. NOT BEING ABLE TO STOP OR CONTROL WORRYING: NEARLY EVERY DAY
1. FEELING NERVOUS, ANXIOUS, OR ON EDGE: NOT AT ALL

## 2021-10-18 ASSESSMENT — PATIENT HEALTH QUESTIONNAIRE - PHQ9
SUM OF ALL RESPONSES TO PHQ QUESTIONS 1-9: 19
5. POOR APPETITE OR OVEREATING: NEARLY EVERY DAY

## 2021-10-18 NOTE — PROGRESS NOTES
Progress Note - Initial Visit    Client Name:  Mason Crowe Date: 2021         Service Type: Individual     Visit Start Time: 9:00am  Visit End Time: 9:52am    Visit #: 1    Attendees: Client attended alone    Service Modality:  Video Visit:      Provider verified identity through the following two step process.  Patient provided:  Patient  and Patient address    Telemedicine Visit: The patient's condition can be safely assessed and treated via synchronous audio and visual telemedicine encounter.      Reason for Telemedicine Visit: Services only offered telehealth    Originating Site (Patient Location): Patient's home    Distant Site (Provider Location): Provider Remote Setting- Home Office    Consent:  The patient/guardian has verbally consented to: the potential risks and benefits of telemedicine (video visit) versus in person care; bill my insurance or make self-payment for services provided; and responsibility for payment of non-covered services.     Patient would like the video invitation sent by:  Send to e-mail at: [email or web address]    Mode of Communication:  Video Conference via [employer]    As the provider I attest to compliance with applicable laws and regulations related to telemedicine.       DATA:   Interactive Complexity: No   Crisis: No     Presenting Concerns/Current Stressors:   Patient presented to session to initiate the ADHD evaluation process.      ASSESSMENT:  Mental Status Assessment:  Appearance:   Appropriate   Eye Contact:   Good   Psychomotor Behavior: Normal   Attitude:   Cooperative   Orientation:   All  Speech   Rate / Production: Normal/ Responsive   Volume:  Normal   Mood:    Normal  Affect:    Appropriate   Thought Content:  Clear   Thought Form:  Logical   Insight:    Good       Safety Issues and Plan for Safety and Risk Management:   [place] Suicide Severity Rating Scale (Lifetime/Recent)  [place] Suicide Severity Rating (Lifetime/Recent)  10/18/2021   1. Wish to be Dead (Lifetime) Yes   Wish to be Dead Description (Lifetime) About 2017/2018. No plan developed.   1. Wish to be Dead (Recent) No   2. Non-Specific Active Suicidal Thoughts (Lifetime) No   2. Non-Specific Active Suicidal Thoughts (Recent) No     Patient denies current fears or concerns for personal safety.  Patient denies current or recent suicidal ideation or behaviors.  Patient denies current or recent homicidal ideation or behaviors.  Patient denies current or recent self injurious behavior or ideation.  Patient denies other safety concerns.  Recommended that patient call 911 or go to the local ED should there be a change in any of these risk factors.       Diagnostic Criteria:  F88:  A. A persistent pattern of inattention and/or hyperactivity-impulsivity that interferes with functioning or development, as characterized by (1) and/or (2):   1. Six or more inattention symptoms that have persisted for at least 6 months to a degree that is inconsistent with developmental level and that negatively impacts directly on social and academic/occupational activities.   2. Six or more hyperactivity and impulsivity symptoms that have persisted for at least 6 months to a degree that is inconsistent with developmental level and that negatively impacts directly on social and academic/occupational activities.  B. Several symptoms (inattentive or hyperactive/impulsive) were present before the age of 12 years.  C. Several symptoms (inattentive or hyperactive/impulsive) present in ?2 settings (eg, at home, school, or work; with friends or relatives; in other activities).  D. There is clear evidence that the symptoms interfere with or reduce the quality of social, academic, or occupational functioning.  E. Symptoms do not occur exclusively during the course of schizophrenia or another psychotic disorder, and are not better explained by another mental disorder (eg, mood disorder, anxiety disorder,  dissociative disorder, personality disorder, substance intoxication, or withdrawal).    F33.1:  A. Five (or more) symptoms have been present during the same 2-week period and represent a change from previous functioning; at least one of the symptoms is either (1) depressed mood or (2) loss of interest or pleasure.   1. Depressed mood.   2. Diminished interest or pleasure in all, or almost all, activities.   3. Significant appetite change.  4. Significant sleep change.   5. Fatigue or loss of energy.   6. Feelings of worthlessness or inappropriate guilt.   7. Diminished ability to think or concentrate, or indecisiveness.   B. The symptoms cause clinically significant distress or impairment in social, occupational, or other important areas of functioning.  C. The episode is not attributable to the physiological effects of a substance or to another medical condition.  D. The occurence of major depressive episode is not better explained by other thought / psychotic disorders.  E. There has never been a manic episode or hypomanic episode.     F41.1:  A. Excessive anxiety and worry, occurring more days than not for at least 6 months about a number of events or activities.   B. The individual finds it difficult to control the worry.  C. The anxiety and worry are associated with 3 or more of 6 symptoms.  D. The anxiety, worry, or physical symptoms cause clinically significant distress or impairment in social, occupational, or other important areas of functioning.  E. The disturbance is not attributable to the physiological effects of a substance (e.g., a drug of abuse, a medication) or another medical condition (e.g., hyperthyroidism).  F. The disturbance is not better explained by another mental disorder (e.g., anxiety or worry about having panic attacks in panic disorder, negative evaluation in social anxiety disorder [social phobia], contamination or other obsessions in obsessive-compulsive disorder, separation from  attachment figures in separation anxiety disorder, reminders of traumatic events in posttraumatic stress disorder, gaining weight in anorexia nervosa, physical complaints in somatic symptom disorder, perceived appearance flaws in body dysmorphic disorder, having a serious illness in illness anxiety disorder, or the content of delusional beliefs in schizophrenia or delusional disorder).      DSM5 Diagnoses: (Sustained by DSM5 Criteria Listed Above)  Diagnoses:   1. Developmental mental disorder    2. Major depressive disorder, recurrent episode, moderate (H)    3. Generalized anxiety disorder      Psychosocial & Contextual Factors: social support, working with psychiatric provider, unemployed  WHODAS 2.0 (12 item):   WHODAS 2.0 Total Score 10/12/2021   Total Score 45   Total Score MyChart 45       Intervention:              Reviewed symptoms and history of presenting concern. Unable to complete diagnostic intake, will be completed in next session.    CBT: socratic questioning, positive reinforcement  EFT: empathetic attunement, emotion checking  MI: open ended questions, affirmations, reflections        Attendance Agreement:  Client has not signed the attendance agreement. Discussed expectations at beginning of this first session and patient agreed.       PLAN:  Provider will continue Diagnostic Assessment in next session. Patient will complete Trixie questionnaires prior to next session (10/25/2021).    Medical necessity criteria is warranted in order to: Measure a psychological disorder and its severity and functional impairment to determine psychiatric diagnosis when a mental illness is suspected, or to achieve a differential diagnosis from a range of medical/psychological disorders that present with similar constellations of symptoms (e.g., determination and measurement of anxiety severity and impact in the presence of ongoing asthma or heart disease), Perform symptom measurement to objectively measure treatment  effectiveness and/or determine the need to refer for pharmacological treatment or other medical evaluation (e.g., based on severity and chronicity of symptoms) and Evaluate primary symptoms of impaired attention and concentration that can occur in many neurological and psychiatric conditions    Medical necessity for psychological assessment is warranted as a result of the following: (1) A specific clinical question is posed that relates to the condition/symptoms being addressed (2) The question cannot be adequately addressed by clinical interview and/or behavioral observation (3) Results of psychological testing are reasonably expected to provide an answer to the query (4) It is reasonably expected that the testing will provide information leading to a clearer diagnosis and/or guide treatment planning with an expectation of improved clinical outcome.    I acknowledge that, based upon current clinical information, the patient and I have reviewed and discussed issues pertaining to the purpose of therapy/testing, potential therapeutic goals, procedures, risks and benefits and estimated duration of therapy/testing. Issues pertaining to fees and confidentiality were also addressed with the patient indicated understanding. The patient was informed that their symptoms may change during the course of assessment, for better or worse, and this may impact the clinical approach and/or the duration of treatment; the patient understands that it is imperative that I am kept informed of the changes when they occur. I will not be providing any experimental procedures and, if we agree that a change in clinical procedure would be more beneficial, I will obtain specific consent for that procedure or refer you to another provider who has expertise in that area.       Elvira Vazquez PsyD,   Clinical Psychologist

## 2021-10-19 PROBLEM — F32.9 MAJOR DEPRESSION: Status: ACTIVE | Noted: 2021-04-06

## 2021-10-19 ASSESSMENT — ANXIETY QUESTIONNAIRES: GAD7 TOTAL SCORE: 15

## 2021-10-25 ENCOUNTER — VIRTUAL VISIT (OUTPATIENT)
Dept: PSYCHOLOGY | Facility: CLINIC | Age: 46
End: 2021-10-25
Attending: NURSE PRACTITIONER
Payer: COMMERCIAL

## 2021-10-25 DIAGNOSIS — F88 DEVELOPMENTAL MENTAL DISORDER: Primary | ICD-10-CM

## 2021-10-25 DIAGNOSIS — F41.1 GENERALIZED ANXIETY DISORDER: ICD-10-CM

## 2021-10-25 DIAGNOSIS — F33.1 MAJOR DEPRESSIVE DISORDER, RECURRENT EPISODE, MODERATE (H): ICD-10-CM

## 2021-10-25 PROCEDURE — 90791 PSYCH DIAGNOSTIC EVALUATION: CPT | Mod: TEL | Performed by: PSYCHOLOGIST
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
## 2021-11-04 ENCOUNTER — TELEPHONE (OUTPATIENT)
Dept: PSYCHIATRY | Facility: CLINIC | Age: 46
End: 2021-11-04

## 2021-11-04 DIAGNOSIS — F41.9 ANXIETY: ICD-10-CM

## 2021-11-04 NOTE — TELEPHONE ENCOUNTER
Writer received medical opinion form for patient, along with attached ZAN for [facility].     Form completed on behalf of the patient and being held in nurse triage for provider signature.    Once signed, forms should be faxed to [facility] at [phone].

## 2021-11-04 NOTE — TELEPHONE ENCOUNTER
Last seen: 10/12/21  RTC: 4 weeks  Cancel: none  No-show: none  Next appt: 11/9/21     Medication requested: hydrOXYzine (ATARAX) 25 MG tablet  Directions: TAKE 1 TO 2 TABLETS(25 TO 50 MG) BY MOUTH EVERY 8 HOURS AS NEEDED FOR ITCHING OR ANXIETY  Qty: 180  Last refilled: 9/30/21 per outside med rec     Called the pt and confirmed she does not need refills of other medications. 30 d/s of hydroxyzine sent to the pharmacy.    Informed the patient that the Medical Opinion form was received. ZAN was completed. Pt asks that forms are faxed to her employer at [phone] (see later telephone encounter).

## 2021-11-04 NOTE — TELEPHONE ENCOUNTER
[facility] Call Center    Phone Message    May a detailed message be left on voicemail: yes     Reason for Call: Medication Refill Request    Has the patient contacted the pharmacy for the refill? Yes   Name of medication being requested: Hydroxyzine   Provider who prescribed the medication: Nelson Waddell  Pharmacy:  [id number] - [place], [place] - [address] AT [facility] [address]  Date medication is needed: ASAP. Patient is completely out tomorrow. Would like a call as she also has some questions about a work form that she faxed on Sunday.         Action Taken: Message routed to:  Other: [facility]    Travel Screening: Not Applicable

## 2021-11-05 RX ORDER — HYDROXYZINE HYDROCHLORIDE 25 MG/1
TABLET, FILM COATED ORAL
Qty: 180 TABLET | Refills: 0 | Status: SHIPPED | OUTPATIENT
Start: 2021-11-05 | End: 2021-11-24

## 2021-11-05 NOTE — TELEPHONE ENCOUNTER
Ana Patterson Laura, RN  Phone Number: [phone]     Denis East,     Pt called and said that [facility] told her they still haven't received any prescription for refills and she is asking that you give her a call back to follow up.     Medication requested: hydrOXYzine (ATARAX) 25 MG tablet     Pharmacy:  [id number] - [place], [place] - [address] AT [facility] [address]     Pt: [phone]       Thank you,   Ana       *Writer inadvertently did not fill the medication. Sent 30 day supply to the pharmacy and notified the patient.

## 2021-11-05 NOTE — TELEPHONE ENCOUNTER
Singed forms received. Writer faxed 3 pages to [facility] At [phone]. Hyun Jesus, EMT

## 2021-11-08 ENCOUNTER — VIRTUAL VISIT (OUTPATIENT)
Dept: BEHAVIORAL HEALTH | Facility: HOSPITAL | Age: 46
End: 2021-11-08
Payer: COMMERCIAL

## 2021-11-08 DIAGNOSIS — F41.9 ANXIETY: ICD-10-CM

## 2021-11-08 DIAGNOSIS — F33.1 MODERATE EPISODE OF RECURRENT MAJOR DEPRESSIVE DISORDER (H): Primary | ICD-10-CM

## 2021-11-08 PROCEDURE — 90834 PSYTX W PT 45 MINUTES: CPT | Mod: GT,95 | Performed by: COUNSELOR

## 2021-11-08 NOTE — PROGRESS NOTES
Progress Note    Patient Name: Mason Crowe  Date: 21         Service Type: Individual      Session Start Time: 1:00pm  Session End Time: 1:52pm     Session Length: 52 minutes    Session #: 1    Attendees: Client attended alone    Service Modality:  Video Visit: Amwell      Provider verified identity through the following two step process.  Patient provided:  Patient  and Patient address    Telemedicine Visit: The patient's condition can be safely assessed and treated via synchronous audio and visual telemedicine encounter.      Reason for Telemedicine Visit: Patient convenience (e.g. access to timely appointments / distance to available provider)    Originating Site (Patient Location): Patient's home    Distant Site (Provider Location): [facility]    Consent:  The patient/guardian has verbally consented to: the potential risks and benefits of telemedicine (video visit) versus in person care; bill my insurance or make self-payment for services provided; and responsibility for payment of non-covered services.     Patient would like the video invitation sent by:  Send to e-mail at: [email or web address]    Mode of Communication:  Video Conference via Amwell    As the provider I attest to compliance with applicable laws and regulations related to telemedicine.     Treatment Plan Last Reviewed: 21  PHQ-9 / QUIN-7 :   PHQ-9 score:    PHQ 10/18/2021   PHQ-9 Total Score 19   Q9: Thoughts of better off dead/self-harm past 2 weeks Not at all       QUIN-7 SCORE 10/18/2021   Total Score 15       DATA  Interactive Complexity: No  Crisis: No       Progress Since Last Session (Related to Symptoms / Goals / Homework):   Symptoms: initial session, pt reports no change since last seeing a provider    Homework: Did not complete  pt was to complete MMPI assessment and did not yet      Episode of Care Goals: Treatment plan,  including goals, was developed this session (pt is in the ready for change stage)     Current / Ongoing Stressors and Concerns:   Needing to return to work/Bills, inability to complete tasks, poor sleep quality/schedule, and ongoing depression symptoms     Treatment Objective(s) Addressed in This Session:   identify and use planner and music listening strategies to improve organization  Feel less tired and more energy during the day   Improve concentration, focus, and mindfulness in daily activities   Developing goals with ways to know progress is being made (treatment plan)     Intervention:   Motivational Interviewing    MI Intervention: Co-Developed Goal: see treatment plan, Expressed Empathy/Understanding, Open-ended questions and Reflections: simple and complex     Change Talk Expressed by the Patient: Desire to change Ability to change Reasons to change Taking steps    Provider Response to Change Talk: E - Evoked more info from patient about behavior change, A - Affirmed patient's thoughts, decisions, or attempts at behavior change, R - Reflected patient's change talk and S - Summarized patient's change talk statements    Solution Focused: Worked with pt to identify past strategies that have helped her be successful in reaching her goals and how they may be applied in current goal achievement.        ASSESSMENT: Current Emotional / Mental Status (status of significant symptoms):   Risk status (Self / Other harm or suicidal ideation)   Patient denies current fears or concerns for personal safety.   Patient denies current or recent suicidal ideation or behaviors.   Patient denies current or recent homicidal ideation or behaviors.   Patient denies current or recent self injurious behavior or ideation.   Patient denies other safety concerns.   Patient reports there has been no change in risk factors since their last session.     Patient reports there has been no change in protective factors since their last  session.     Recommended that patient call 911 or go to the local ED should there be a change in any of these risk factors.     Appearance:   Appropriate    Eye Contact:   Fair    Psychomotor Behavior: Normal    Attitude:   Cooperative  Friendly Pleasant   Orientation:   All   Speech    Rate / Production: Normal     Volume:  Normal    Mood:    Normal Dysphoric   Affect:    Appropriate    Thought Content:  Clear    Thought Form:  Coherent  Logical    Insight:    Good      Medication Review:   Changes to psychiatric medications, see updated Medication List in EPIC.      Medication Compliance:   Pt has been discussing med changes with her provider and is compliant with the medication that are working for her.     Changes in Health Issues:   None reported     Chemical Use Review:   Substance Use: Chemical use reviewed, no active concerns identified      Tobacco Use: No change in amount of tobacco use since last session.  Patient declined discussion at this time    Diagnosis:  1. Moderate episode of recurrent major depressive disorder (H)    2. Anxiety        Collateral Reports Completed:   Not Applicable    PLAN: (Patient Tasks / Therapist Tasks / Other)  Pt has agreed to write in her planner one task to complete for the day when she gets up   Pt has agreed to utilize music listening strategies when engaging in tasks to complete more days then not between now and next appointment  Pt has agreed to try and complete one task written in her planner each day  Pt has agreed to utilize her cpap more days then not between now and next appointment        Chavez Farmer                                                         ______________________________________________________________________    Treatment Plan    Patient's Name: Mason Crowe  YOB: 1975    Date: 11/8/21    DSM5 Diagnoses: Other Neurodevelopmental Disorders  315.9 (F89) Unspecified Neurodevelopmental Disorder or 296.32 (F33.1) Major Depressive  Disorder, Recurrent Episode, Moderate _  Psychosocial / Contextual Factors: pt's inability to complete tasks and focus to the point she can't work or help her mother, which is likely contributing to her depressive symptoms. Pt is currently undergoing ADHD assessment that still needs to be finished, but pt has not been able to focus on task related to it to complete it.    WHODAS:   WHODAS 2.0 Total Score 10/12/2021   Total Score 45   Total Score MyChart 45       Referral / Collaboration:  Referral to another professional/service is not indicated at this time..  Therapist will encourage pt to continue following up with her ADHD assessment/ and psychiatry/med provider to that she was working with prior to initial therapy visit    Anticipated number of session or this episode of care: Pt is engaging in ongoing weekly therapy.      MeasurableTreatment Goal(s) related to diagnosis / functional impairment(s)  Goal 1: Patient will improve in task completion rate and organization.    I will know I've met my goal when I am able to get things done in a single day.      Objective #A Use daily planner each day to identify tasks to complete.    Patient will use daily planner 50% of the time  Increase interest, engagement, and pleasure in doing things  Improve concentration, focus, and mindfulness in daily activities .  Status: New - Date: 11/8/21     Intervention(s)  Therapist will assign homework between each session related to topic matter  teach strategies related to behavior activitation and habit forming skills/strategies.    Objective #B Listen to music or other simultaneous activity to improve concentration/focus  Patient will identify activites she can do simultaniously to help her stay on task  Increase interest, engagement, and pleasure in doing things  Identify negative self-talk and behaviors: challenge core beliefs, myths, and actions  Feel less fidgety, restless or slow in daily activities / interpersonal  interactions.  Status: New - Date: 11/8/21     Intervention(s)  Therapist will assign homework between session related to topic matter.  Assist in identifying and developing cocurrent behaviors that are conducive to staying on task.    Objective #C Complete follow-through on commitments   Patient will complete her ADHD assessment and paperwork related to ongoing sobriety/education/work related objectives.  Status: New - Date: 11/8/21     Intervention(s)  Therapist will assign homework between sessions related to topic matter  make referrals to psychiatry and ADHD   assist in identifying barriers to success.      Goal 2: Patient will improve quality of sleep and reduce daytime sleepiness.    I will know I've met my goal when I can stay awake during at work and get to bed at a consistent time.      Objective #A Use CPAP machine consistently throughout the week.    Status: New - Date: 11/8/21     Patient will Improve quantity and quality of night time sleep / decrease daytime naps  Feel less tired and more energy during the day .    Intervention(s)  Therapist will assign homework between session related to topic.  provide educational materials on sleep hygiene strategies and the impact of sleep on health.    Objective #B Develop a sleep routine/habits that better supports a work/life balance  Patient will identify and use habit/routine forming strategies to improve organization  Improve quantity and quality of night time sleep / decrease daytime naps  Feel less tired and more energy during the day   Improve concentration, focus, and mindfulness in daily activities .    Status: New - Date: 11/8/21     Intervention(s)  Therapist will assign homework between session related to topic matter.  teach the client how to perform a behavioral chain analysis. Developing strategies to promote better sleep routine..      Goal 3: Patiient will have fewer days of feeling hopeless or worthless    I will know I've met my goal  when I am able to not get stuck in feelings of depression.      Objective #A Better manage feelings of guilt and sadness related to past   Status: New - Date: 11/8/21     Patient will Decrease frequency and intensity of feeling down, depressed, hopeless  Identify negative self-talk and behaviors: challenge core beliefs, myths, and actions.    Intervention(s)  Therapist will assign homework between session related to topic matter  teach emotional recognition/identification. recongizing triggers and ways to be proactive in coping with them..    Patient has reviewed and agreed to the above plan.      Chavez Farmer  November 8, 2021

## 2021-11-09 ENCOUNTER — VIRTUAL VISIT (OUTPATIENT)
Dept: PSYCHIATRY | Facility: CLINIC | Age: 46
End: 2021-11-09
Attending: NURSE PRACTITIONER
Payer: COMMERCIAL

## 2021-11-09 ENCOUNTER — TELEPHONE (OUTPATIENT)
Dept: PSYCHIATRY | Facility: CLINIC | Age: 46
End: 2021-11-09
Payer: COMMERCIAL

## 2021-11-09 DIAGNOSIS — F90.8 ATTENTION DEFICIT HYPERACTIVITY DISORDER (ADHD), OTHER TYPE: ICD-10-CM

## 2021-11-09 PROCEDURE — 99213 OFFICE O/P EST LOW 20 MIN: CPT | Mod: TEL | Performed by: NURSE PRACTITIONER

## 2021-11-09 NOTE — TELEPHONE ENCOUNTER
On November 9, 2021, at 3:02 PM, writer called patient at mobile to confirm Virtual Visit. Writer unable to make contact with patient. Writer left detailed voice message for call back. [phone] left as call back number. JAME Estrada    On November 9, 2021, at 3:37 PM, writer called patient at mobile to confirm Virtual Visit. Writer unable to make contact with patient. A link to the video visit was sent to the patient's email address and mobile phone number. Rene Bowman, EMT
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
## 2021-11-12 ENCOUNTER — TELEPHONE (OUTPATIENT)
Dept: PSYCHIATRY | Facility: CLINIC | Age: 46
End: 2021-11-12
Payer: COMMERCIAL

## 2021-11-12 NOTE — TELEPHONE ENCOUNTER
[facility] Call Center    Phone Message    May a detailed message be left on voicemail: yes     Reason for Call: Form or Letter   Type or form/letter needing completion: medical opinion for for [facility]  Provider: Nelson Waddell  Date form needed: asap  Once completed: Fax form to: [phone]     Pt reports her worker says this form has not been received by [facility] yet and has another fax number to try sending it to: [phone]      Action Taken: Message routed to:  Other: nursing pool    Travel Screening: Not Applicable

## 2021-11-15 ENCOUNTER — VIRTUAL VISIT (OUTPATIENT)
Dept: BEHAVIORAL HEALTH | Facility: HOSPITAL | Age: 46
End: 2021-11-15
Payer: COMMERCIAL

## 2021-11-15 DIAGNOSIS — F33.1 MODERATE EPISODE OF RECURRENT MAJOR DEPRESSIVE DISORDER (H): Primary | ICD-10-CM

## 2021-11-15 DIAGNOSIS — F41.9 ANXIETY DISORDER: ICD-10-CM

## 2021-11-15 PROCEDURE — 90834 PSYTX W PT 45 MINUTES: CPT | Mod: GT,95 | Performed by: COUNSELOR

## 2021-11-15 NOTE — PROGRESS NOTES
"                                           Progress Note    Patient Name: Mason Crowe  Date: 11/15/21           Service Type: Individual      Session Start Time: 3:10pm  Session End Time: 4:00     Session Length: 50 minutes    Session #: 2    Attendees: Client attended alone    Service Modality:  Phone Visit:      Provider verified identity through the following two step process.  Patient provided:  Patient is known previously to provider    The patient has been notified of the following:      \"We have found that certain health care needs can be provided without the need for a face to face visit.  This service lets us provide the care you need with a phone conversation.       I will have full access to your [facility] medical record during this entire phone call.   I will be taking notes for your medical record.      Since this is like an office visit, we will bill your insurance company for this service.       There are potential benefits and risks of telephone visits (e.g. limits to patient confidentiality) that differ from in-person visits.?Confidentiality still applies for telephone services, and nobody will record the visit.  It is important to be in a quiet, private space that is free of distractions (including cell phone or other devices) during the visit.??      If during the course of the call I believe a telephone visit is not appropriate, you will not be charged for this service\"     Consent has been obtained for this service by care team member: Yes        Treatment Plan Last Reviewed: 11/8/21  PHQ-9 / QUIN-7 :   PHQ-9 score:    PHQ 10/18/2021   PHQ-9 Total Score 19   Q9: Thoughts of better off dead/self-harm past 2 weeks Not at all             QUIN-7 SCORE 10/18/2021   Total Score 15       DATA  Interactive Complexity: No  Crisis: No       Progress Since Last Session (Related to Symptoms / Goals / Homework):   Symptoms: No change pt was able to complete some focus based tasks, but was not able " to complete exactly what she wanted to. Pt did acknowledged that she was able to get some things done and focus for a 2+hours window on a single thing at least once, but is very concerned about her time management and ability to stay sober if she continues to be stressed due to poor focus.     Homework: Partially completed      Episode of Care Goals: Minimal progress - PREPARATION (Decided to change - considering how); Intervened by negotiating a change plan and determining options / strategies for behavior change, identifying triggers, exploring social supports, and working towards setting a date to begin behavior change     Current / Ongoing Stressors and Concerns:   Financial concerns/bills, needing to get sobriety paperwork/assignments done, and upcoming change in medication provider.      Treatment Objective(s) Addressed in This Session:   identify and use verbal/reminder strategies to improve organization  Recognize accomplishments and reduce negative self-talk.     Intervention:   CBT: Recognize the connection between feelings, thoughts, and behaviors.    Motivational Interviewing    MI Intervention: Expressed Empathy/Understanding, Supported Autonomy, Collaboration, Evocation, Open-ended questions and Reflections: simple and complex     Change Talk Expressed by the Patient: Desire to change Ability to change Reasons to change Need to change Committment to change    Provider Response to Change Talk: E - Evoked more info from patient about behavior change, A - Affirmed patient's thoughts, decisions, or attempts at behavior change, R - Reflected patient's change talk and S - Summarized patient's change talk statements          ASSESSMENT: Current Emotional / Mental Status (status of significant symptoms):   Risk status (Self / Other harm or suicidal ideation)   Patient denies current fears or concerns for personal safety.   Patient denies current or recent suicidal ideation or behaviors.   Patient denies  current or recent homicidal ideation or behaviors.   Patient denies current or recent self injurious behavior or ideation.   Patient denies other safety concerns.   Patient reports there has been no change in risk factors since their last session.     Patient reports there has been no change in protective factors since their last session.     Recommended that patient call 911 or go to the local ED should there be a change in any of these risk factors.     Appearance:   unable to assess due to appointment type    Eye Contact:   unable to assess due to appointment type    Psychomotor Behavior: Normal  unable to assess due to appointment type    Attitude:   Cooperative  Friendly [place]   Orientation:   All   Speech    Rate / Production: Normal/ Responsive    Volume:  Normal    Mood:    Normal Dysphoric   Affect:    Appropriate  Blunted    Thought Content:  Clear    Thought Form:  Coherent  Logical    Insight:    Fair      Medication Review:   No changes to current psychiatric medication(s)     Medication Compliance:   pt is still working with her provider on meds and hasn't seen him yet this week.     Changes in Health Issues:   None reported     Chemical Use Review:   Substance Use: Chemical use reviewed, no active concerns identified      Tobacco Use: No change in amount of tobacco use since last session.       Diagnosis:  1. Moderate episode of recurrent major depressive disorder (H)    2. Anxiety disorder        Collateral Reports Completed:   Not Applicable    PLAN: (Patient Tasks / Therapist Tasks / Other)  Continue to use planner, at least 1 time before next session  Complete at least one task in planner prior to next week  Start taking her sleep assist medication as prescribed consistently  Use verbal reminder mantra to help focus on current task/objective in the moment          Chavez Farmer                                                           ______________________________________________________________________    Treatment Plan     Patient's Name: Mason Crowe               YOB: 1975     Date: 11/8/21     DSM5 Diagnoses: Other Neurodevelopmental Disorders  315.9 (F89) Unspecified Neurodevelopmental Disorder or 296.32 (F33.1) Major Depressive Disorder, Recurrent Episode, Moderate _  Psychosocial / Contextual Factors: pt's inability to complete tasks and focus to the point she can't work or help her mother, which is likely contributing to her depressive symptoms. Pt is currently undergoing ADHD assessment that still needs to be finished, but pt has not been able to focus on task related to it to complete it.    WHODAS:   WHODAS 2.0 Total Score 10/12/2021   Total Score 45   Total Score MyChart 45         Referral / Collaboration:  Referral to another professional/service is not indicated at this time..  Therapist will encourage pt to continue following up with her ADHD assessment/ and psychiatry/med provider to that she was working with prior to initial therapy visit     Anticipated number of session or this episode of care: Pt is engaging in ongoing weekly therapy.        MeasurableTreatment Goal(s) related to diagnosis / functional impairment(s)  Goal 1: Patient will improve in task completion rate and organization.    I will know I've met my goal when I am able to get things done in a single day.       Objective #A Use daily planner each day to identify tasks to complete.                    Patient will use daily planner 50% of the time  Increase interest, engagement, and pleasure in doing things  Improve concentration, focus, and mindfulness in daily activities .  Status: New - Date: 11/8/21      Intervention(s)  Therapist will assign homework between each session related to topic matter  teach strategies related to behavior activitation and habit forming skills/strategies.     Objective #B Listen to music or other  simultaneous activity to improve concentration/focus  Patient will identify activites she can do simultaniously to help her stay on task  Increase interest, engagement, and pleasure in doing things  Identify negative self-talk and behaviors: challenge core beliefs, myths, and actions  Feel less fidgety, restless or slow in daily activities / interpersonal interactions.  Status: New - Date: 11/8/21      Intervention(s)  Therapist will assign homework between session related to topic matter.  Assist in identifying and developing cocurrent behaviors that are conducive to staying on task.     Objective #C Complete follow-through on commitments   Patient will complete her ADHD assessment and paperwork related to ongoing sobriety/education/work related objectives.  Status: New - Date: 11/8/21      Intervention(s)  Therapist will assign homework between sessions related to topic matter  make referrals to psychiatry and ADHD   assist in identifying barriers to success.        Goal 2: Patient will improve quality of sleep and reduce daytime sleepiness.    I will know I've met my goal when I can stay awake during at work and get to bed at a consistent time.       Objective #A Use CPAP machine consistently throughout the week.                       Status: New - Date: 11/8/21      Patient will Improve quantity and quality of night time sleep / decrease daytime naps  Feel less tired and more energy during the day .     Intervention(s)  Therapist will assign homework between session related to topic.  provide educational materials on sleep hygiene strategies and the impact of sleep on health.     Objective #B Develop a sleep routine/habits that better supports a work/life balance  Patient will identify and use habit/routine forming strategies to improve organization  Improve quantity and quality of night time sleep / decrease daytime naps  Feel less tired and more energy during the day   Improve concentration, focus, and  mindfulness in daily activities .                 Status: New - Date: 11/8/21      Intervention(s)  Therapist will assign homework between session related to topic matter.  teach the client how to perform a behavioral chain analysis. Developing strategies to promote better sleep routine..        Goal 3: Patiient will have fewer days of feeling hopeless or worthless    I will know I've met my goal when I am able to not get stuck in feelings of depression.       Objective #A Better manage feelings of guilt and sadness related to past   Status: New - Date: 11/8/21      Patient will Decrease frequency and intensity of feeling down, depressed, hopeless  Identify negative self-talk and behaviors: challenge core beliefs, myths, and actions.     Intervention(s)  Therapist will assign homework between session related to topic matter  teach emotional recognition/identification. recongizing triggers and ways to be proactive in coping with them..     Patient has reviewed and agreed to the above plan.        Chavez Farmer                        November 8, 2021

## 2021-11-22 ENCOUNTER — DOCUMENTATION ONLY (OUTPATIENT)
Dept: BEHAVIORAL HEALTH | Facility: CLINIC | Age: 46
End: 2021-11-22
Payer: COMMERCIAL

## 2021-11-22 NOTE — PROGRESS NOTES
Therapist (writer) sent invite as per appointment note instructions to email five minutes before the appointment. Pt did not arrive on time and therapist called pt 10 minutes into the appointment to prompt them to join via invite or call the office or mychart help if they were having an issue. Therapist did resend invite via SMS as well after leaving . Therapist waited an addition 10-15 minutes for pt to join and when they did not the session was cancelled. Therapist did call the  prior to cancelling to find out if pt had reached out to the office, and pt was not reported to have reached out. Therapist will attempt to meet with pt again next week at pre-scheduled appointment.

## 2021-11-23 ENCOUNTER — TELEPHONE (OUTPATIENT)
Dept: PSYCHIATRY | Facility: CLINIC | Age: 46
End: 2021-11-23
Payer: COMMERCIAL

## 2021-11-23 ENCOUNTER — VIRTUAL VISIT (OUTPATIENT)
Dept: PSYCHOLOGY | Facility: CLINIC | Age: 46
End: 2021-11-23
Payer: COMMERCIAL

## 2021-11-23 DIAGNOSIS — F33.1 MODERATE EPISODE OF RECURRENT MAJOR DEPRESSIVE DISORDER (H): ICD-10-CM

## 2021-11-23 DIAGNOSIS — F90.0 ATTENTION DEFICIT HYPERACTIVITY DISORDER, INATTENTIVE TYPE, MODERATE: Primary | ICD-10-CM

## 2021-11-23 DIAGNOSIS — G47.00 INSOMNIA, UNSPECIFIED TYPE: ICD-10-CM

## 2021-11-23 DIAGNOSIS — F33.1 MAJOR DEPRESSIVE DISORDER, RECURRENT EPISODE, MODERATE (H): ICD-10-CM

## 2021-11-23 DIAGNOSIS — F41.9 ANXIETY: ICD-10-CM

## 2021-11-23 PROBLEM — F41.1 GENERALIZED ANXIETY DISORDER: Status: RESOLVED | Noted: 2021-11-23 | Resolved: 2021-11-23

## 2021-11-23 PROBLEM — F41.1 GENERALIZED ANXIETY DISORDER: Status: ACTIVE | Noted: 2021-11-23

## 2021-11-23 PROCEDURE — 96131 PSYCL TST EVAL PHYS/QHP EA: CPT | Mod: GT | Performed by: PSYCHOLOGIST

## 2021-11-23 PROCEDURE — 96130 PSYCL TST EVAL PHYS/QHP 1ST: CPT | Mod: GT | Performed by: PSYCHOLOGIST

## 2021-11-23 NOTE — PROGRESS NOTES
"    Psychological Assessment Report    Patient: Mason Crowe  YOB: 1975  MRN: [id number]  Date(s) of assessment: 10/18/2021 and 10/25/2021   Referral Source: ERNIE Amezcua, CNP - [facility]  Reason for Referral: assessing reported deficits in executive functioning     IDENTIFYING INFORMATION AND BRIEF HISTORY OF PRESENTING PROBLEM: Mason Crowe is a 46-year-old [facility] [place] woman who presented to the initial diagnostic intake appointments on 10/18/2021 and 10/25/2021 due to primary concerns with sitting still and concentrating. The patient indicated that her psychiatry provider noticed symptoms and referred her testing as a result of his concerns.     As a child, the patient reported having problems with organization (messy room and backpack).  She described that her mother had to sort through her backpack because she would not remember to retrieve important items on her own.  She also recalled procrastinating homework.  The patient reported that her mom said she had difficulty sitting still or paying attention in school as a result of problems being discussed at conferences.  Teachers reportedly described her as being \"in [facility].\"  The patient indicated that teachers advised her mother to discuss problems with her pediatrician, but because her mother is from [facility], she was unsure how to navigate the healthcare system in order to do this.  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  She indicated that reading and completing papers was difficult and recalled crying because of her problems with these tasks.  Currently, the patient reported that she is unemployed because she recently lost her job (as a ) of 4 days because she fell asleep during the training.  In previous employment, the patient reported that she has received feedback that she is " "on the phone too long with customers, talks too much, and mismanages her time.  Currently, the patient reported experiencing the following symptoms: making careless mistakes/problems attending to details (unable to retain details during the training at her previous employment), difficulty sustaining attention (difficulty reading, watching TV, working on adult coloring books), not following through with tasks, difficulty organizing (despite not liking clutter, many items lying all over), avoiding tasks that require sustained mental effort (always procrastinates, occasionally misses deadlines, did not complete all of the necessary paperwork when her mother fell/had an accident), losing things often (needs to spend a lot of time searching for misplaced items), being easily distracted (feels as though her mind is \"all over the place,\" urge to engage in other tasks while already working on a certain task), being forgetful (is \"absent minded\"), is fidgety, and talks excessively/interrupts others.  The patient is seeking diagnostic clarification and updated treatment recommendations.    Mental Health History: The patient reported that she has been diagnosed with depression and anxiety in the past.  She indicated that she was prescribed Strattera to help manage her depression, anxiety, and inattention symptoms.  However, she did not believe that the medication was helpful and has discontinued use. She was also recently notified that her psychiatry provider has resigned. She recently began individual psychotherapy with Andi Farmer. The patient denied a history of manic symptoms, social anxiety, phobic responses, symptoms of obsessive-compulsive disorder, and perceptual difficulties. The patient denied issues with sexual compulsivity, gambling, and disordered eating.    Developmental History: The patient reported that she believes that she is the product of a full-term pregnancy and there were no complications during her " mother's pregnancy or birth. The patient reported that she believes she met all of her developmental milestones on time. She denied a history of head injuries, learning disorders, and special educational programming. The patient recalled academic strengths in math as well as weaknesses in cursive.    Chemical Dependence/Substance Abuse History: The patient reported using alcohol excessively in the past ([id number]). She has maintained sobriety since 2018.      SOURCES OF DATA/ASSESSMENT: Review of medical and psychiatric records, consideration of behavioral observations during the testing (if applicable), and the results of the psychological tests are all considered in the preparation of this psychological test report. It is important to note that test results comprise a hypothesis of the patient s mental health concerns and are not an independent or conclusive assessment. Test results are combined with the patient s available medical, psychological, behavioral data for an integrated interpretation and report. Due to virtual/remote administration, certain aspects of the assessment process were impacted, such as access to direct patient observation, and maintaining an environment conducive to testing. As such, external factors have the potential to affect the validity of data collected.    TESTS ADMINISTERED:    Trixie Adult ADHD Rating Scale-IV: Self and Other Reports (BAARS-IV)    Trixie Functional Impairment Scale: Self and Other Reports (BFIS)    Trixie Deficits in Executive Functioning Scale (BDEFS)    Generalized Anxiety Disorder Questionnaire (QUIN-7)    Patient Health Questionnaire- 9 (PHQ-9)    [place] Multiphasic Personality Inventory - 2 (MMPI - 2)     BEHAVIORAL OBSERVATIONS: The patient was pleasant and cooperative throughout all interview and explanation of testing process. The patient was oriented to person, place, and time. Mood was neutral. Eye contact was adequate and speech was at normal rate  "and rhythm. Motor activity was appropriate. Due to virtual/remote administration, direct patient observation was not possible during the testing process, and it is unknown if the patient was able to maintain an environment conducive to testing. As such, external factors have the potential to affect the validity of data collected.     TEST RESULTS: Test results comprise a hypothesis of the patient s mental health concerns and are not an independent or conclusive assessment. Test results are combined with the patient s available medical, psychological, behavioral, and observational data for an integrated interpretation and report.    Trixie Adult ADHD Rating Scale-IV: Self and Other Reports (BAARS-IV)  The BAARS-IV assesses for symptoms of ADHD that are experienced in one's daily life. This assessment measure includes self and collateral rating scales designed to provide information regarding current and childhood symptoms of ADHD including inattention, hyperactivity, and impulsivity. Self-report scores are reported as percentiles. Scores at the 76th-83rd percentile are considered marginal, scores at the 84th-92nd percentile are considered borderline, scores at the 93rd-95th percentile are considered mild, scores at the 96th-98th percentile are considered moderate, and those at the 99th percentile are considered severe. Collateral or \"other\" rating scales are reported as number of symptoms observed in comparison to those reported by the client. Norms and percentile scores are not available for collateral reports.     Current Symptoms Scale--Self Report:   Client completed the self-report inventory of current symptoms. The results indicate that the client's Total ADHD Score was 57 which places the patient in the 99+ percentile for overall ADHD symptoms. In addition, the client endorsed 9/9 (99+ percentile) Inattention symptoms, 4/9 (97th percentile) Hyperactivity-Impulsivity symptoms, and 9/9 (99+ percentile) Sluggish " Cognitive Tempo symptoms. Client indicated that the reported symptoms have resulted in impaired functioning in school, home, work, and social relationships. Overall, the results suggest the client is experiencing moderate-severe ADHD symptoms.     Childhood Symptoms Scale--Self-Report:  Client completed the self-report inventory of childhood symptoms. The results indicate that the client's Total ADHD Score was 64 which places the patient in the 99+ percentile for overall ADHD symptoms in childhood. In addition, the client endorsed 9/9 (99+ percentile) Inattention symptoms and 6/9 (97th percentile) Hyperactivity-Impulsivity symptoms. Client indicated that the reported symptoms resulted in impaired functioning in school and at home.  Overall, the results suggest the client experienced moderate-severe symptoms of ADHD as a child.     Childhood Symptoms Scale--Other Report:  Client's mother completed the collateral report inventory of childhood symptoms. Based on the collateral contact's recollection of client's childhood symptoms, the client demonstrated 9/9 Inattention symptoms and 8/9 Hyperactivity-Impulsivity symptoms. The client's Total ADHD Score was 63. The collateral contact indicated the client demonstrates impaired functioning in school and at home.  The collateral- and self-report scores are not significantly different.                           Trixie Functional Impairment Scale: Self and Other Reports (BFIS)  The BFIS is used to assess an individuals' psychosocial impairment in major life/daily activities that may be due to a mental health disorder. This assessment measure includes self and collateral rating scales. Self-report scores are reported as percentiles. Scores at the 76th-83rd percentile are considered marginal, scores at the 84th-92nd percentile are considered borderline, scores at the 93rd-95th percentile are considered mild, scores at the 96th-98th percentile are considered moderate, and those  "at the 99th percentile are considered severe. Collateral or \"other\" rating scales are reported as number of symptoms observed in comparison to those reported by the client. Norms and percentile scores are not available for collateral reports.     Results indicate the client identified impairment (scores at or greater than 93rd percentile) in the following areas: home-family, home-chores, work, social-strangers, social-friends, community activities, education, money management, daily responsibilities, self-care routines, and health maintenance.  The client's Mean Impairment Score was 8.2 (99+ percentile) indicating the client is reporting 100% impairment in functioning across domains. Client's mother completed the collateral rating scale, which indicated somewhat discrepant results. The collateral contact's scores were generally lower than the client's report.     Trixie Deficits in Executive Functioning Scale (BDEFS)  The BDEFS is a measure used for evaluating dimensions of adult executive functioning in daily life. This assessment measure includes self and collateral rating scales. Self-report scores are reported as percentiles. Scores at the 76th-83rd percentile are considered marginal, scores at the 84th-92nd percentile are considered borderline, scores at the 93rd-95th percentile are considered mild, scores at the 96th-98th percentile are considered moderate, and those at the 99th percentile are considered severe. Collateral or \"other\" rating scales are reported as number of symptoms observed in comparison to those reported by the client. Norms and percentile scores are not available for collateral reports.     Results indicate the client's Total Executive Functioning Score was 247 (99+ percentile). The ADHD-Executive Functioning Index score was 31 (98th percentile). These scores suggest the client has moderate deficits in executive functioning. Results indicate the client identified significant deficits in the " following areas: self-management to time (severe deficits), self-organization/problem-solving (moderate deficits), self-restraint (mild deficits), self-motivation (moderate deficits) and self-regulation of emotions (borderline deficits). Client's mother completed the collateral rating scale, which indicated similar results.     Generalized Anxiety Disorder Questionnaire (QUIN-7)  This questionnaire is designed to assess for anxiety in adults. Based on the score, the patient is experiencing moderate symptoms of anxiety. Client identified the following symptoms of anxiety: feeling on edge/nervous/anxious, difficulty controlling worry, worrying about many different things, trouble relaxing, being restless, and becoming easily annoyed or irritable.    Patient Health Questionnaire- 9 (PHQ-9)   This questionnaire is designed to assess for depression in adults. Based on the score, the patient is experiencing moderate symptoms of depression. Client identified the following symptoms of depression: depressed mood, lack of interest, difficulty with sleep, feeling tired or having little energy, feeling bad about self, poor concentration, and restlessness or lethargy.    [place] Multiphasic Personality Inventory - 2 (MMPI-2)    The MMPI-2 was administered to evaluate current level of emotional distress. Validity profile indicates that the patient appears to have answered in a generally straightforward and consistent manner, and obtained results are considered to be reliable and valid in representing current psychological status. No items were omitted.      Emotional Well-being: At this time, the patient may be experiencing some symptoms of depression.  For her, these likely manifest as feeling blue and having low energy.  She may have some somatic complaints that are further negatively impacting her emotional state.  Overall, she likely has a sense of unhappiness and may feel as though she has lived an unrewarding life thus  far.    Cognitions: The patient is likely struggling with concentration and memory problems.  She may be prone to intellectualizing problems.    Behaviors: The patient likely behaves in a responsible manner.  She is probably hard-working.  She probably has a on excitable, slow, and inefficient style and going about completing tasks.    Interpersonal Style: In her relationships, the patient is probably sociable and friendly.  Though, she may be feeling misunderstood and isolated at this time.    SUMMARY: Mason Crowe is a 46-year-old [facility] [place] woman who completed psychological testing remotely/virtually due to the COVID-19 pandemic. Testing was requested to provide updated diagnostic clarification and necessary treatment recommendations.    Patient first completed a diagnostic interview in which mental health symptoms, ADHD symptoms, and background information was gathered. Patient self-reported eight symptoms of inattention and indicated that her abilities to function at home and work are significantly impaired. Further, her self-reported symptoms on Trixie measures of ADHD symptoms were consistent with this information. Both the patient and her mother recalled significant symptoms in childhood.     An objective measure of personality indicated that the patient is likely experiencing significant depression symptoms in the past, consistent with self-reported information that she was diagnosed in the past. It is likely that her difficulties with attention, concentration, and memory are increasing and/or exacerbated by these mental health symptoms. Given childhood history of inattention symptoms and lack of other mental health symptoms as a child, current problems with inattention are best conceptualized as having a neurodevelopmental basis.  See recommendations below.    Referral Question Response: DSM-5 criteria for ADHD:   A. Symptom Count - Are there sufficient symptoms for the diagnosis? Yes, patient  did endorse sufficient symptoms.   B. Onset - Were several symptoms present before 12 years of age? Yes, a significant number of symptoms reportedly began in elementary school.   C. Pervasiveness - Are several symptoms present in at least two settings? Yes, patient reported that symptoms are problematic at home and work.   D. Impairment - Do symptoms interfere with or reduce the quality of functioning? Yes, patient is unable to complete daily and work tasks effectively.   E. Exclusions - Are symptoms better explained by another disorder or factor? No, symptoms are not better explained by anxiety and depression symptoms. Difficulties are explained by an organic basis of inattention.     The patient also meets the following DSM-5 criteria for major depressive disorder:  A. Five (or more) symptoms have been present during the same 2-week period and represent a change from previous functioning; at least one of the symptoms is either (1) depressed mood or (2) loss of interest or pleasure.   1. Depressed mood.   2. Diminished interest or pleasure in all, or almost all, activities.   3. Significant sleep change.   4. Fatigue or loss of energy.   5. Feelings of worthlessness or inappropriate guilt.   6. Diminished ability to think or concentrate, or indecisiveness.   7. Psychomotor agitation or lethargy.  B. The symptoms cause clinically significant distress or impairment in social, occupational, or other important areas of functioning.  C. The episode is not attributable to the physiological effects of a substance or to another medical condition.  D. The occurrence of major depressive episode is not better explained by other thought / psychotic disorders.  E. There has never been a manic episode or hypomanic episode.     DIAGNOSES:  F90.0 Attention-Deficit/Hyperactivity Disorder, inattentive presentation, moderate  F33.1 Major Depressive Disorder, recurrent episode, moderate    PLAN OF CARE:  1. Discuss the following with  your new psychiatry provider or PCP:  a. Consider a trial of a stimulant medication. This may help alleviate some of the patient s attentional symptoms.   b. Consider a trial of a psychotropic medication. This may help alleviate some of the patient s depression symptoms.     2. Continue individual psychotherapy with Andi Farmer.    RECOMMENDATIONS:  1. Due to the patient s reported attention, concentration, and mood difficulties, the following health/lifestyle changes when combined, can significantly improve symptoms:   a. Avoid simple carbohydrates at breakfast. Aim for only complex carbohydrates and lean protein for your morning meal.   b. Engage in aerobic exercise 3 times per week for 30 minutes, ensuring that your heart rate stays within your training zone. Further, reading the book,  Spark,  by Christopher Obrien M.D can help the patient understand the benefits of exercise on the brain.   c. Research suggest that taking a high-quality multi-vitamin and antioxidant (1/2 cup of blueberries) daily in conjunction with balanced nutrition can be helpful.  d. Aim for the high end of daily water intake: around 72 ounces per day.  e. Ensure regular meals and snacks to maintain optimal attention.    2. The following may be beneficial in managing some of the patient s attention and concentration difficulties:  a. Due to the patient s difficulties with attention and concentration, consider working in a completely distraction-free area while completing tasks. Workspaces should be completely clear except for the materials needed for the current task. Both visual and auditory distractions should be decreased as much as possible.  b. Considering decreased ability to focus and maintain attention, it is recommended that the patient take frequent breaks while completing tasks. This will help to maintain attention and effort. The patient may benefit from the use of a [employer] Timer. The timer works by using built-in break times. After  "working on a task consistently for 25 minutes, the timer reminds the user to take a five-minute break before continuing, etc. A [employer] timer can be downloaded as a free alicia to a phone or tablet.  c. Due to the patient s attentional and concentration symptoms, it is recommended to increase organization with the use of lists and calendars. Significantly increasing structure to the day and adhering to a set schedule can increase your ability to complete responsibilities, track deadline, etc. Breaking these tasks down into their component parts and recording them in a calendar/planner will likely be beneficial. Patient would benefit from setting feasible timelines for completion of activities. By establishing clear priorities for completing tasks, you can more likely complete the most important tasks first. The patient may also choose to elect to a friend or family member to help hold them accountable.    3. Avoid multitasking. Attempting to work on multiple tasks and projects the same increases the likelihood that an error will occur. Focus on one task at a time.    4. Due to the patient s difficulties with sleep, it recommended to engage in a relaxing activity up to the point of sleep. The patient may want to spend time reading, drawing/ coloring, practicing mindfulness, listening (not watching) to podcasts, music, etc., or try the [employer] alicia, which is free to download and may aid falling asleep more easily.    5. The patient may benefit from engaging in mindfulness practices. This may include breathing techniques, apps that provide guided meditation, or more interactive activities such as coloring.    6. Develop a \"coping skills jar/box.\" This entails designating a certain container to hold slips of paper with distraction technique ideas written on each slip of paper. Distraction techniques may include listening to a certain type of music, playing on game on your phone, doing a breathing exercise, spending " time with a pet, calling a certain individual, looking at a magazine, working on a puzzle, etc. When feeling distressed, choose a slip of paper from the container and engage in that activity rather than focusing on the problem.    7. See the attached tip sheet for more ideas as well as online and book resources.       Elvira Vazquez PsyD, LP  Clinical Psychologist

## 2021-11-23 NOTE — TELEPHONE ENCOUNTER
[facility] Call Center    Phone Message    May a detailed message be left on voicemail: yes     Reason for Call: Other: Call back   Pt calling back stating she missed Nelson's call from this afternoon due to another appointment. Looking for clarification and a call back to discuss further.     Action Taken: Message routed to:  Other: nursing pool    Travel Screening: Not Applicable

## 2021-11-23 NOTE — PROGRESS NOTES
"                                             Patient Name: Mason Crowe  Date: 2021       Service Type:  Individual (ADHD feedback session)      Session Start Time: 3:05pm  Session End Time:  3:45pm     Session Length: 40 minutes    Session #: 3    Attendees: Patient attended alone    Service Modality: Phone Visit:      Provider verified identity through the following two step process.  Patient provided:  Patient  and Patient address    The patient has been notified of the following:      \"We have found that certain health care needs can be provided without the need for a face to face visit.  This service lets us provide the care you need with a phone conversation.       I will have full access to your [facility] medical record during this entire phone call.   I will be taking notes for your medical record.      Since this is like an office visit, we will bill your insurance company for this service.       There are potential benefits and risks of telephone visits (e.g. limits to patient confidentiality) that differ from in-person visits.?Confidentiality still applies for telephone services, and nobody will record the visit.  It is important to be in a quiet, private space that is free of distractions (including cell phone or other devices) during the visit.??      If during the course of the call I believe a telephone visit is not appropriate, you will not be charged for this service\"     Consent has been obtained for this service by care team member: Yes       PHQ 10/1/2019 2019 10/18/2021   PHQ-9 Total Score 16 12 19   Q9: Thoughts of better off dead/self-harm past 2 weeks Not at all Not at all Not at all       QUIN-7 SCORE 10/18/2021   Total Score 15         DATA      Progress Since Last Session (Related to Symptoms / Goals / Homework):   Symptoms: Stable.    Homework: Completed.      Treatment Objective(s) Addressed in This Session:   Provided feedback on ADHD evaluation. " Reviewed test results in depth. Plan of care and recommendations were discussed based on testing data. See full report attached on secondary note in this encounter.     Intervention:   Provided feedback to patient regarding testing results, diagnoses, and treatment recommendations. Test results are consistent with an ADHD diagnosis. Symptoms are not better explained by depression and anxiety disorders. Personalized suggestions regarding symptoms were offered. Patient had the opportunity to ask questions; she expressed understanding.        ASSESSMENT: Current Emotional / Mental Status (status of significant symptoms):   Risk status (Self / Other harm or suicidal ideation)   Patient denies current fears or concerns for personal safety.   Patient denies current or recent suicidal ideation or behaviors.   Patient denies current or recent homicidal ideation or behaviors.   Patient denies current or recent self injurious behavior or ideation.   Patient denies other safety concerns.   Patient reports there has been no change in risk factors since their last session.     Patient reports there has been no change in protective factors since their last session.     Recommended that patient call 911 or go to the local ED should there be a change in any of these risk factors.     Appearance:   unable to assess - phone appointment    Eye Contact:   unable to assess - phone appointment    Psychomotor Behavior: unable to assess - phone appointment    Attitude:   Cooperative  Pleasant   Orientation:   All   Speech    Rate / Production: Normal     Volume:  Normal    Mood:    Normal   Affect:    unable to assess - phone appointment    Thought Content:  Clear    Thought Form:  Coherent  Logical    Insight:    Good      Medication Review:   Changes to psychiatric medications, see updated Medication List in EPIC.      Medication Compliance:   NA     Changes in Health Issues:   None reported     Chemical Use Review:   Substance Use:  Chemical use reviewed, no active concerns identified      Tobacco Use: No change in use.    Diagnosis:  1. Attention deficit hyperactivity disorder, inattentive type, moderate    2. Major depressive disorder, recurrent episode, moderate (H)        PLAN:   Recommendations are outlined in full evaluation report (attached to this encounter).   Patient indicated understanding and will contact the clinic if there are further questions.    Parts of this documentation may have been completed using dictation software. Potential errors may result and are unintentional.       Elvira Vazquez PsyD, LP  Clinical Psychologist         Psychological Testing Services Summary       Testing Evaluation Services Base: 66619  (first 60 mins) Add-on: 26098  (each addtl 60 mins)   Record Review and Clarify Referral Question   8:50am-9:00am on 10/18/2021 10 minutes   Clinical Decision Making/Battery Modification   2:45am-3:00am on 10/25/2021 15 minutes   Integration/Report Generation   1:00pm-2:00pm on 11/22/2021 ([facility])  3:00pm-3:45pm on 11/22/2021 ([facility])  3:45pm-4:45pm on 11/22/2021 (Final Report)   60 minutes  45 minutes  60 minutes   Interactive Feedback Session   3:05pm-3:45pm on 11/23/2021 40 minutes   Post-Service Work   3:45pm-4:00pm on 11/23/2021 15 minutes   Total Time: 245 minutes   Total Units: 1 3       Diagnoses:   F90.0 Attention-Deficit/Hyperactivity Disorder, inattentive presentation, moderate  F33.1 Major Depressive Disorder, recurrent episode, moderate

## 2021-11-23 NOTE — PATIENT INSTRUCTIONS
Coping with Attention-Deficit/Hyperactivity Disorder (ADHD)    If you have ADHD, it can be hard to sit still, pay attention or control impulsive behavior. ADHD can cause problems at home, at school, at work and in social settings. But you can learn ways to control your symptoms. Below are some ideas for coping with the most common ADHD problems.     Memory, Focus, and Managing Time    Be mindful of time. Use a watch, phone, timer, alarm, PDA or computer--anything that keeps accurate time that you can see and hear at all times. Set more than one alarm to remind you how much time you have left for a task. Give yourself more time than you think you need. For important appointments or deadlines, set reminder alarms hours or days ahead of time.     Use a day planner. A day planner can help you manage time and remember responsibilities. Learning to use a planner is just like learning to use any tool--practice makes perfect.     Use lists. Lists and notes can help you keep track of regular tasks, projects, deadlines and appointments. If you decide to use a daily planner, keep all lists and notes inside of it. Use color codes for tasks so you know which ones are the most important.    Learn to say no and set boundaries. Do not take on too many projects or social engagements. Overbooking yourself can be overwhelming and can lead to missed commitments.    Repeat out loud the instructions you have been given. This will help you remember, as well as let others correct you if needed.    Organization    Create space. Ask yourself what you need on a daily basis. Then, find storage bins or closets for things you don t need every day. Have specific areas for things like keys, bills and other items that are easy to misplace. Throw away or donate things you don t need.     Deal with it now. You can avoid forgetfulness, clutter and procrastination by doing things right away, not some time in the future. This includes filing papers,  cleaning up messes, opening and responding to mail and returning phone calls.    Set up a filing system. Use dividers or separate file folders for different types of documents, such as medical records, receipts and pay stubs. Label and color-code your files so that you can quickly find what you need.     Break larger tasks into small, manageable pieces. Write down the steps needed to complete the task. Follow each step in order until the task is done. If needed, take small, timed breaks and return to finish the task.    Organize your work space. Use lists or planners to organize your day. Remove clutter from your desk. Label any storage bins. Reduce distraction as much as possible. Ideas include sitting facing the wall, closing your door or using a white noise machine.    Sitting Still    Move around as needed. Don t fight the urge to move around. If you need to fidget or stand up for a period of time to help maintain attention, then do so. But take care that you re not interrupting others. You may also find it helpful to squeeze a stress ball.    Be active in a useful way. Exercise can burn off extra energy, relieve stress, boost your mood and calm your mind. Meditation, yoga or medardo chi can help you better control your attention and impulses.    Stay healthy. Get enough sleep. Avoid caffeine late in the day. Exercise. Create a relaxing bedtime routine. Stick to a schedule or daily routine. Eat small meals throughout the day. Avoid sugar, eat fewer carbohydrates and eat more protein.     Close Relationships    Talk to your loved ones about ADHD. There are many resources available that can help your loved one understand ADHD. See the Resources section below.    Divide daily tasks based on each person s strengths. For example, if you have trouble with organization, you may not want to do financial planning tasks.    If you have trouble with focus, develop a sign that others in your life can use as a gentle reminder to  pay attention. The sign should be small but meaningful to you and the other person.    Improve communication. Use a dry erase board in a common area to help the whole family stay in touch better. Keep regular to-do lists and compare scheduled activities every day. Writing notes to each other is also very helpful.    Be an active listener and try not to interrupt. If you find your mind wandering when others are talking, mentally repeat their words so you can follow the conversation. Ask questions and ask people to repeat what they said if needed. Pay close attention to body language.     Organize your thoughts. If you need to have a serious conversation, write a list of the points you would like to make or the important ideas you want to talk about. This can help you stay focused and remember what you need to say.    Think before speaking. It can be hard to control your impulses when you feel strongly about something. Before saying whatever pops into your head, stop to ask yourself  Will this be helpful?  or  Will this help me get what I want?     Take charge of your life. Work to accept that some things are harder for you. Make a plan to address these troubles and seek the support you need.    Online Resources    ADD WarehSezWho. http://www.AriadNEXT.com    ADHD and You.  Tips for Adults with ADHD.  http://www.adhdandyou.com/adhd-patient/adhd-tips-young-adult/    Attention Deficit Disorder Association. http://www.add.org    Attention Deficit Disorder Resources. http://www.addresources.org    Children and Adults with Attention-Deficit/Hyperactivity Disorder (ANNABELLE). http://www.annabelle.org/    Debi Albarado.  33 Ways to Get Organized with Adult ADHD.  http://www.additudemag.com/adhd/article/729.html    National Resource Center on ADHD. http://www.xmxi8hafz.org/    Thalia Connor.  Daily Living Tips for Adult ADHD.  http://www.medicinenet.com/living_tips_adult_adhd_pictures_slideshow/article.htm    Yuniel Blanchard and  Yelitza Delgadillo.  Help for Adult ADD / ADHD.  http://www.helpguide.org/mental/adhd_add_adult_strategies.htm    You can also find many apps for your phone that can help you with common ADHD struggles    Book Resources    Gian Marcum. ADHD in Adults. (2008).    Gian Marcum. Taking Charge of Adult ADHD. (2010).    Juan Carlos Justin and Christopher Obrien. Delivered from Distraction. (2006).    Juan Carlos Justin and Christopher Obrien. Driven to Distraction. (2011).    Ronda Paredes. ADD in the Workplace. (1997).    Ronda Paredes. ADD-Friendly Ways to Organize Your Life. (2002).    Юлия Beckman. The ADHD Effect on Marriage: Understand and Rebuild Your Relationship in Six Steps. (2010).    Julienne Flowers and Julieta Whelan. You Mean I m Not Lazy, Stupid or Crazy? (2006).    Darren Peña. Understand Your Brain, Get More Done: The ADHD Executive Functions Workbook. (2012).    Support Groups  ADHD Adult Support Group  [address].  7:00 to 8:30 p.m., last Thursday of each month (except December).  Contact/RSVP: [email or web address]    ADHD Adult Support Group  [address].  Thursday 10:00 a.m. to 12:00 p.m. or 7:00 to 9:30 p.m.  Contact/RSVP: John Caal. [phone] or [email or web address]     ADHD Adult Support Group for Spouses/Partners of adults with ADHD  [address].  Tuesday 12:00 to 2:00 p.m.   Contact/RSVP: John Caal. [phone] or [email or web address]

## 2021-11-24 RX ORDER — HYDROXYZINE HYDROCHLORIDE 25 MG/1
TABLET, FILM COATED ORAL
Qty: 180 TABLET | Refills: 0 | Status: SHIPPED | OUTPATIENT
Start: 2021-11-24 | End: 2021-12-29

## 2021-11-24 RX ORDER — ESCITALOPRAM OXALATE 10 MG/1
10 TABLET ORAL DAILY
Qty: 30 TABLET | Refills: 0 | Status: SHIPPED | OUTPATIENT
Start: 2021-11-24 | End: 2022-01-07

## 2021-11-24 RX ORDER — TRAZODONE HYDROCHLORIDE 50 MG/1
50 TABLET, FILM COATED ORAL
Qty: 30 TABLET | Refills: 0 | Status: SHIPPED | OUTPATIENT
Start: 2021-11-24 | End: 2021-12-29

## 2021-11-24 RX ORDER — GABAPENTIN 400 MG/1
800 CAPSULE ORAL 3 TIMES DAILY
Qty: 180 CAPSULE | Refills: 0 | Status: SHIPPED | OUTPATIENT
Start: 2021-11-24 | End: 2022-01-07

## 2021-11-24 NOTE — TELEPHONE ENCOUNTER
Mago Phan MD Labossiere, Laura, TRI; Kelley Hsieh MD  Cc: Wan Britton MD; Abbey Hogan APRN CNP  Caller: Unspecified (Yesterday,  3:46 PM)  Denis East,   I went into your note and signed the refills.   Thanks all for your help with this.   Mago

## 2021-11-24 NOTE — TELEPHONE ENCOUNTER
"Writer called the patient to discuss the plan laid out by Dr. Hsieh. Writer also quickly checked in about the Lexapro, as this was started at the 11/9 visit.  Patient reports slight improvements in her mood since starting the Lexapro and denies any side effects. She stated, \"I don't feel like jumping off a margarita anymore.\" Still, she reports ongoing depressive symptoms, but confirmed she no longer has SI. Asked that she continue to monitor her symptoms and reach out to the clinic if her SI returns.     Patient has been meeting with Elvira Vazquez, psychologist with [facility]. Elvira is suggesting the patient is prescribed a medication for ADHD. The patient was hoping to discuss this with Nelson before he left the clinic. She understands this will need to be addressed at one of her future appointments, but is hoping to start a medication before she starts school in January.    Patient agreed to meet with Dr. Muller on 12/6 at 8 am for 60 minutes and will then meet with Abbey Hogan on 1/4/22 at 2 pm for 60 mins. Both appointments will be by video. Message sent to scheduling and providers have been updated.    Lastly, patient reports she will need refills of her Lexapro 10 mg tablet, gabapentin 400 mg capsule, hydroxyzine 25 mg tablet, and trazodone 50 mg tablet before the appointment with Dr. Muller on 12/6.    Medications pended and routed to Dr. Hsieh and the doctor of the day for approval.     Per the outside med rec, hydroxyzine was last filled on 11/6, trazodone was filled on 9/26, and Lexapro was filled on 11/2. Per [facility], gabapentin was filled on 11/5/21, 10/6/21, and 8/29/21.   "

## 2021-11-24 NOTE — TELEPHONE ENCOUNTER
Kelley Hsieh MD Labossiere, Laura, RN; P Psychiatry Scheduling-[facility]; Rosanna Bailey  Cc: Abbey Hogan APRN CNP; Wan Britton MD; Carly Brito MD; Coffin, Richard Breyen, MD  Goal of the visit:     Nelson's pt with plan to-   -transfer to [facility] in Jan   -see G4 for one transition visit   -needs followup on med changes made by Nelson   Nov 9   -appt with Abbey should be set up now, along with appt for G4   -I will ask Kita to explain the plan to the pt (just this one time until I get clear who can explain the plans to pts)       SCHEDULING-  towards the end of the day today (give Kita time to talk to pt) please do this:     1) schedule with Abbey for 60min in Jan (transfer appt) 1st or 2nd week     2) schedule with one of these G4s per pt availability     Wan           Dec 6      8:00   60 min   Carly  Dec 7      8:00  60 min   Bharat      Dec 10   any 60 min   OR   Wan           Dec 13   any 60 min   Carly  Dec 14   any 60 min   Bharat      Dec 17   any 60 min

## 2021-11-29 ENCOUNTER — VIRTUAL VISIT (OUTPATIENT)
Dept: BEHAVIORAL HEALTH | Facility: HOSPITAL | Age: 46
End: 2021-11-29
Payer: COMMERCIAL

## 2021-11-29 DIAGNOSIS — F33.1 MODERATE EPISODE OF RECURRENT MAJOR DEPRESSIVE DISORDER (H): Primary | ICD-10-CM

## 2021-11-29 DIAGNOSIS — F90.0 ATTENTION DEFICIT HYPERACTIVITY DISORDER, INATTENTIVE TYPE, MODERATE: ICD-10-CM

## 2021-11-29 PROCEDURE — 90834 PSYTX W PT 45 MINUTES: CPT | Mod: GT,95 | Performed by: COUNSELOR

## 2021-11-29 ASSESSMENT — ANXIETY QUESTIONNAIRES
GAD7 TOTAL SCORE: 9
1. FEELING NERVOUS, ANXIOUS, OR ON EDGE: SEVERAL DAYS
6. BECOMING EASILY ANNOYED OR IRRITABLE: NEARLY EVERY DAY
4. TROUBLE RELAXING: SEVERAL DAYS
7. FEELING AFRAID AS IF SOMETHING AWFUL MIGHT HAPPEN: SEVERAL DAYS
3. WORRYING TOO MUCH ABOUT DIFFERENT THINGS: SEVERAL DAYS
2. NOT BEING ABLE TO STOP OR CONTROL WORRYING: SEVERAL DAYS
7. FEELING AFRAID AS IF SOMETHING AWFUL MIGHT HAPPEN: SEVERAL DAYS
5. BEING SO RESTLESS THAT IT IS HARD TO SIT STILL: SEVERAL DAYS
GAD7 TOTAL SCORE: 9
GAD7 TOTAL SCORE: 9
8. IF YOU CHECKED OFF ANY PROBLEMS, HOW DIFFICULT HAVE THESE MADE IT FOR YOU TO DO YOUR WORK, TAKE CARE OF THINGS AT HOME, OR GET ALONG WITH OTHER PEOPLE?: EXTREMELY DIFFICULT

## 2021-11-29 ASSESSMENT — PATIENT HEALTH QUESTIONNAIRE - PHQ9
SUM OF ALL RESPONSES TO PHQ QUESTIONS 1-9: 14
4. FEELING TIRED OR HAVING LITTLE ENERGY: NEARLY EVERY DAY
1. LITTLE INTEREST OR PLEASURE IN DOING THINGS: SEVERAL DAYS
3. TROUBLE FALLING OR STAYING ASLEEP OR SLEEPING TOO MUCH: NEARLY EVERY DAY
10. IF YOU CHECKED OFF ANY PROBLEMS, HOW DIFFICULT HAVE THESE PROBLEMS MADE IT FOR YOU TO DO YOUR WORK, TAKE CARE OF THINGS AT HOME, OR GET ALONG WITH OTHER PEOPLE: EXTREMELY DIFFICULT
2. FEELING DOWN, DEPRESSED, IRRITABLE, OR HOPELESS: SEVERAL DAYS
SUM OF ALL RESPONSES TO PHQ QUESTIONS 1-9: 14
7. TROUBLE CONCENTRATING ON THINGS, SUCH AS READING THE NEWSPAPER OR WATCHING TELEVISION: NEARLY EVERY DAY
SUM OF ALL RESPONSES TO PHQ QUESTIONS 1-9: 14
9. THOUGHTS THAT YOU WOULD BE BETTER OFF DEAD, OR OF HURTING YOURSELF: NOT AT ALL
10. IF YOU CHECKED OFF ANY PROBLEMS, HOW DIFFICULT HAVE THESE PROBLEMS MADE IT FOR YOU TO DO YOUR WORK, TAKE CARE OF THINGS AT HOME, OR GET ALONG WITH OTHER PEOPLE: EXTREMELY DIFFICULT
5. POOR APPETITE OR OVEREATING: MORE THAN HALF THE DAYS

## 2021-11-29 NOTE — PROGRESS NOTES
Progress Note    Patient Name: Mason Crowe  Date: 11/29/21         Service Type: Individual      Session Start Time: 3:05pm  Session End Time: 3:55     Session Length: 50 minutes    Session #: 3    Attendees: Client attended alone    Service Modality:  Video Visit: Amwell      Provider verified identity through the following two step process.  Patient provided:  Patient is known previously to provider    Telemedicine Visit: The patient's condition can be safely assessed and treated via synchronous audio and visual telemedicine encounter.      Reason for Telemedicine Visit: Services only offered telehealth    Originating Site (Patient Location): Patient's home    Distant Site (Provider Location): Provider Remote Setting- Home Office    Consent:  The patient/guardian has verbally consented to: the potential risks and benefits of telemedicine (video visit) versus in person care; bill my insurance or make self-payment for services provided; and responsibility for payment of non-covered services.     Patient would like the video invitation sent by:  Send to e-mail at: [email or web address]    Mode of Communication:  Video Conference via Amwell    As the provider I attest to compliance with applicable laws and regulations related to telemedicine.     Treatment Plan Last Reviewed: 11/8/21  PHQ-9 / QUIN-7 :   PHQ-9 score:    PHQ 11/29/2021   PHQ-9 Total Score 14   Q9: Thoughts of better off dead/self-harm past 2 weeks Not at all     QUIN-7 SCORE 10/18/2021 11/29/2021   Total Score - 9 (mild anxiety)   Total Score 15 9       DATA  Interactive Complexity: No  Crisis: No       Progress Since Last Session (Related to Symptoms / Goals / Homework):   Symptoms: Improving pt reports that her symtpoms have not really improved since last session, thought her self-report assessment indicate improvement.    Homework: Partially completed      Episode of Care Goals: Minimal progress -  PREPARATION (Decided to change - considering how); Intervened by negotiating a change plan and determining options / strategies for behavior change, identifying triggers, exploring social supports, and working towards setting a date to begin behavior change     Current / Ongoing Stressors and Concerns:   Pt is currently seeking therapy due to ongoing depression and ADHD related symptoms. Pt has some anxiety as well, but this is better attributed to her depression and ADHD then by itself. 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 Pt did see the ADHD , which provided some recommendations and confirmed the ADHD diagnosis. Pt continues to feel like she can't focus on anything and that she has no routine outside of taking care of her bunny and taking care of her laundry on laundry day. Pt describes being able to complete tasks better since last session and overall sounds to be doing better, but still has struggles with recognizing success.      Treatment Objective(s) Addressed in This Session:   identify and use planner and music listening strategies to improve organization  Feel less tired and more energy during the day   Improve concentration, focus, and mindfulness in daily activities      Intervention:   Motivational Interviewing    MI Intervention: Co-Developed Goal: see treatment plan, Expressed Empathy/Understanding, Open-ended questions and Reflections: simple and complex     Change Talk Expressed by the Patient: Desire to change Ability to change Reasons to change Taking steps    Provider Response to Change Talk: E - Evoked more info from patient about behavior change, A - Affirmed patient's thoughts, decisions, or attempts at behavior change, R - Reflected patient's change talk and S - Summarized patient's change talk statements     ASSESSMENT: Current Emotional / Mental Status (status of significant symptoms):   Risk status  (Self / Other harm or suicidal ideation)   Patient denies current fears or concerns for personal safety.   Patient denies current or recent suicidal ideation or behaviors.   Patient denies current or recent homicidal ideation or behaviors.   Patient denies current or recent self injurious behavior or ideation.   Patient denies other safety concerns.   Patient reports there has been no change in risk factors since their last session.     Patient reports there has been no change in protective factors since their last session.     Recommended that patient call 911 or go to the local ED should there be a change in any of these risk factors.     Appearance:   Appropriate    Eye Contact:   Fair    Psychomotor Behavior: Normal    Attitude:   Cooperative  Friendly Pleasant   Orientation:   All   Speech    Rate / Production: Normal     Volume:  Normal    Mood:    Normal Dysphoric   Affect:    Appropriate    Thought Content:  Clear    Thought Form:  Coherent  Logical    Insight:    Good      Medication Review:   Changes to psychiatric medications, see updated Medication List in EPIC.      Medication Compliance:   Pt has been discussing med changes with her provider and is compliant with the medication that are working for her.     Changes in Health Issues:   None reported     Chemical Use Review:   Substance Use: Chemical use reviewed, no active concerns identified      Tobacco Use: No change in amount of tobacco use since last session.  Patient declined discussion at this time    Diagnosis:  1. Moderate episode of recurrent major depressive disorder (H)    2. Attention deficit hyperactivity disorder, inattentive type, moderate        Collateral Reports Completed:   Not Applicable    PLAN: (Patient Tasks / Therapist Tasks / Other)  Pt has agreed to write in her planner one task to complete for the day when she gets up   Pt has agreed to utilize music listening strategies when engaging in tasks to complete more days then not  between now and next appointment  Pt has agreed to try and complete one task written in her planner each day  Pt has agreed to utilize her cpap more days then not between now and next appointment        Chavez Farmer                                                         ______________________________________________________________________    Treatment Plan    Patient's Name: Mason Crowe  YOB: 1975    Date: 11/8/21    DSM5 Diagnoses: Attention-Deficit/Hyperactivity Disorder  314.00 (F90.0) Predominantly inattentive presentation or 296.32 (F33.1) Major Depressive Disorder, Recurrent Episode, Moderate _ and With anxious distress  Psychosocial / Contextual Factors: pt's inability to complete tasks and focus to the point she can't work or help her mother, which is likely contributing to her depressive symptoms. Pt is currently undergoing ADHD assessment that still needs to be finished, but pt has not been able to focus on task related to it to complete it.    WHODAS:   WHODAS 2.0 Total Score 10/12/2021   Total Score 45   Total Score MyChart 45       Referral / Collaboration:  Referral to another professional/service is not indicated at this time..  Therapist will encourage pt to continue following up with her ADHD assessment/ and psychiatry/med provider to that she was working with prior to initial therapy visit    Anticipated number of session or this episode of care: Pt is engaging in ongoing weekly therapy.      MeasurableTreatment Goal(s) related to diagnosis / functional impairment(s)  Goal 1: Patient will improve in task completion rate and organization.    I will know I've met my goal when I am able to get things done in a single day.      Objective #A Use daily planner each day to identify tasks to complete.    Patient will use daily planner 50% of the time  Increase interest, engagement, and pleasure in doing things  Improve concentration, focus, and mindfulness in daily  activities .  Status: New - Date: 11/8/21     Intervention(s)  Therapist will assign homework between each session related to topic matter  teach strategies related to behavior activitation and habit forming skills/strategies.    Objective #B Listen to music or other simultaneous activity to improve concentration/focus  Patient will identify activites she can do simultaniously to help her stay on task  Increase interest, engagement, and pleasure in doing things  Identify negative self-talk and behaviors: challenge core beliefs, myths, and actions  Feel less fidgety, restless or slow in daily activities / interpersonal interactions.  Status: New - Date: 11/8/21     Intervention(s)  Therapist will assign homework between session related to topic matter.  Assist in identifying and developing cocurrent behaviors that are conducive to staying on task.    Objective #C Complete follow-through on commitments   Patient will complete her ADHD assessment and paperwork related to ongoing sobriety/education/work related objectives.  Status: New - Date: 11/8/21     Intervention(s)  Therapist will assign homework between sessions related to topic matter  make referrals to psychiatry and ADHD   assist in identifying barriers to success.      Goal 2: Patient will improve quality of sleep and reduce daytime sleepiness.    I will know I've met my goal when I can stay awake during at work and get to bed at a consistent time.      Objective #A Use CPAP machine consistently throughout the week.    Status: New - Date: 11/8/21     Patient will Improve quantity and quality of night time sleep / decrease daytime naps  Feel less tired and more energy during the day .    Intervention(s)  Therapist will assign homework between session related to topic.  provide educational materials on sleep hygiene strategies and the impact of sleep on health.    Objective #B Develop a sleep routine/habits that better supports a work/life  balance  Patient will identify and use habit/routine forming strategies to improve organization  Improve quantity and quality of night time sleep / decrease daytime naps  Feel less tired and more energy during the day   Improve concentration, focus, and mindfulness in daily activities .    Status: New - Date: 11/8/21     Intervention(s)  Therapist will assign homework between session related to topic matter.  teach the client how to perform a behavioral chain analysis. Developing strategies to promote better sleep routine..      Goal 3: Patiient will have fewer days of feeling hopeless or worthless    I will know I've met my goal when I am able to not get stuck in feelings of depression.      Objective #A Better manage feelings of guilt and sadness related to past   Status: New - Date: 11/8/21     Patient will Decrease frequency and intensity of feeling down, depressed, hopeless  Identify negative self-talk and behaviors: challenge core beliefs, myths, and actions.    Intervention(s)  Therapist will assign homework between session related to topic matter  teach emotional recognition/identification. recongizing triggers and ways to be proactive in coping with them..    Patient has reviewed and agreed to the above plan.      Chavez Farmer  November 8, 2021

## 2021-11-30 ASSESSMENT — ANXIETY QUESTIONNAIRES: GAD7 TOTAL SCORE: 9

## 2021-11-30 ASSESSMENT — PATIENT HEALTH QUESTIONNAIRE - PHQ9: SUM OF ALL RESPONSES TO PHQ QUESTIONS 1-9: 14

## 2021-12-03 DIAGNOSIS — F33.1 MODERATE EPISODE OF RECURRENT MAJOR DEPRESSIVE DISORDER (H): ICD-10-CM

## 2021-12-06 ENCOUNTER — VIRTUAL VISIT (OUTPATIENT)
Dept: PSYCHIATRY | Facility: CLINIC | Age: 46
End: 2021-12-06
Attending: PSYCHIATRY & NEUROLOGY
Payer: COMMERCIAL

## 2021-12-06 DIAGNOSIS — F90.8 ATTENTION DEFICIT HYPERACTIVITY DISORDER (ADHD), OTHER TYPE: Primary | ICD-10-CM

## 2021-12-06 PROCEDURE — 99214 OFFICE O/P EST MOD 30 MIN: CPT | Mod: GC | Performed by: STUDENT IN AN ORGANIZED HEALTH CARE EDUCATION/TRAINING PROGRAM

## 2021-12-06 RX ORDER — GABAPENTIN 400 MG/1
CAPSULE ORAL
Qty: 180 CAPSULE | Refills: 0 | OUTPATIENT
Start: 2021-12-06

## 2021-12-06 RX ORDER — METHYLPHENIDATE HYDROCHLORIDE 18 MG/1
18 TABLET ORAL EVERY MORNING
Qty: 40 TABLET | Refills: 0 | Status: SHIPPED | OUTPATIENT
Start: 2021-12-06 | End: 2022-01-07

## 2021-12-06 RX ORDER — ESCITALOPRAM OXALATE 10 MG/1
TABLET ORAL
Qty: 30 TABLET | Refills: 0 | OUTPATIENT
Start: 2021-12-06
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
## 2021-12-08 NOTE — TELEPHONE ENCOUNTER
Last seen: 4/26  RTC: 1 month  Non-provider Cancel: None  No-show: None  Next appt: 7/10    Incoming refill from patient via telephone     Medication requested:   Disp Refills Start End AMY   buPROPion (WELLBUTRIN XL) 150 MG 24 hr tablet 30 tablet 0 4/26/2019  No   Sig - Route: Take 1 tablet (150 mg) by mouth every morning     Disp Refills Start End AMY    hydrOXYzine (ATARAX) 25 MG tablet 180 tablet 0 4/26/2019  No   Sig - Route: Take 1-2 tablets (25-50 mg) by mouth every 8 hours as needed for itching or anxiety     Writer pended requested meds.  Routed to provider due to Wellbutrin interruption.     Event Note

## 2021-12-25 DIAGNOSIS — F41.9 ANXIETY: ICD-10-CM

## 2021-12-27 DIAGNOSIS — G47.00 INSOMNIA, UNSPECIFIED TYPE: ICD-10-CM

## 2021-12-29 RX ORDER — TRAZODONE HYDROCHLORIDE 50 MG/1
TABLET, FILM COATED ORAL
Qty: 30 TABLET | Refills: 0 | Status: SHIPPED | OUTPATIENT
Start: 2021-12-29 | End: 2022-02-10

## 2021-12-29 RX ORDER — HYDROXYZINE HYDROCHLORIDE 25 MG/1
TABLET, FILM COATED ORAL
Qty: 180 TABLET | Refills: 0 | Status: SHIPPED | OUTPATIENT
Start: 2021-12-29 | End: 2022-01-07

## 2021-12-29 NOTE — TELEPHONE ENCOUNTER
Medication requested:HYDROXYZINE HCL 25MG TABS (WHITE)  TAKE 1 TO 2 TABLETS(25 TO 50 MG) BY MOUTH EVERY 8 HOURS AS NEEDED FOR ITCHING OR ANXIETY  Last refilled: 11/24/2021  Qty: 180/0      Last seen: 12/6/21 Keny Muller  RTC: 1 month with new NP provider   Cancel: 0  No-show: 0  Next appt: 1/7/2022 Samira    Refill decision:   Refilled for 30 days per protocol.

## 2021-12-29 NOTE — TELEPHONE ENCOUNTER
Medication requested: TRAZODONE 50MG TABLETS  Last refilled: 11-24-21  Qty: 30      Last seen: 12-6-21 ( Gaurav)  RTC: 1 month  Cancel: 1  No-show: 0  Next appt: 1-7-22 ( Samira)    AMIRAH(  Previous pt of JOSHUA Honeycutt)    Refill decision:   Refill pended and routed to the provider for review/determination due to one cancelled appt

## 2022-01-06 ASSESSMENT — ANXIETY QUESTIONNAIRES
GAD7 TOTAL SCORE: 14
7. FEELING AFRAID AS IF SOMETHING AWFUL MIGHT HAPPEN: NOT AT ALL
GAD7 TOTAL SCORE: 14
GAD7 TOTAL SCORE: 14
5. BEING SO RESTLESS THAT IT IS HARD TO SIT STILL: NEARLY EVERY DAY
3. WORRYING TOO MUCH ABOUT DIFFERENT THINGS: NEARLY EVERY DAY
4. TROUBLE RELAXING: SEVERAL DAYS
1. FEELING NERVOUS, ANXIOUS, OR ON EDGE: MORE THAN HALF THE DAYS
7. FEELING AFRAID AS IF SOMETHING AWFUL MIGHT HAPPEN: NOT AT ALL
2. NOT BEING ABLE TO STOP OR CONTROL WORRYING: MORE THAN HALF THE DAYS
6. BECOMING EASILY ANNOYED OR IRRITABLE: NEARLY EVERY DAY

## 2022-01-06 ASSESSMENT — PATIENT HEALTH QUESTIONNAIRE - PHQ9
10. IF YOU CHECKED OFF ANY PROBLEMS, HOW DIFFICULT HAVE THESE PROBLEMS MADE IT FOR YOU TO DO YOUR WORK, TAKE CARE OF THINGS AT HOME, OR GET ALONG WITH OTHER PEOPLE: EXTREMELY DIFFICULT
SUM OF ALL RESPONSES TO PHQ QUESTIONS 1-9: 21
SUM OF ALL RESPONSES TO PHQ QUESTIONS 1-9: 21

## 2022-01-07 ENCOUNTER — VIRTUAL VISIT (OUTPATIENT)
Dept: PSYCHIATRY | Facility: CLINIC | Age: 47
End: 2022-01-07
Attending: NURSE PRACTITIONER
Payer: COMMERCIAL

## 2022-01-07 ENCOUNTER — TELEPHONE (OUTPATIENT)
Dept: PSYCHIATRY | Facility: CLINIC | Age: 47
End: 2022-01-07
Payer: COMMERCIAL

## 2022-01-07 DIAGNOSIS — F10.21 ALCOHOL USE DISORDER, SEVERE, IN SUSTAINED REMISSION (H): ICD-10-CM

## 2022-01-07 DIAGNOSIS — F41.9 ANXIETY: ICD-10-CM

## 2022-01-07 DIAGNOSIS — F33.2 SEVERE EPISODE OF RECURRENT MAJOR DEPRESSIVE DISORDER, WITHOUT PSYCHOTIC FEATURES (H): Primary | ICD-10-CM

## 2022-01-07 DIAGNOSIS — F90.8 ATTENTION DEFICIT HYPERACTIVITY DISORDER (ADHD), OTHER TYPE: ICD-10-CM

## 2022-01-07 PROCEDURE — 99215 OFFICE O/P EST HI 40 MIN: CPT | Mod: GT | Performed by: NURSE PRACTITIONER

## 2022-01-07 RX ORDER — HYDROXYZINE HYDROCHLORIDE 25 MG/1
25-50 TABLET, FILM COATED ORAL EVERY 8 HOURS PRN
Qty: 180 TABLET | Refills: 1 | Status: SHIPPED | OUTPATIENT
Start: 2022-01-07 | End: 2022-03-30

## 2022-01-07 RX ORDER — METHYLPHENIDATE HYDROCHLORIDE 18 MG/1
18 TABLET ORAL EVERY MORNING
Qty: 40 TABLET | Refills: 0 | Status: SHIPPED | OUTPATIENT
Start: 2022-01-07 | End: 2022-02-10

## 2022-01-07 RX ORDER — GABAPENTIN 400 MG/1
800 CAPSULE ORAL 3 TIMES DAILY
Qty: 180 CAPSULE | Refills: 1 | Status: SHIPPED | OUTPATIENT
Start: 2022-01-07 | End: 2022-02-10

## 2022-01-07 RX ORDER — VENLAFAXINE HYDROCHLORIDE 37.5 MG/1
37.5 CAPSULE, EXTENDED RELEASE ORAL DAILY
Qty: 30 CAPSULE | Refills: 1 | Status: SHIPPED | OUTPATIENT
Start: 2022-01-07 | End: 2022-02-10

## 2022-01-07 ASSESSMENT — ANXIETY QUESTIONNAIRES: GAD7 TOTAL SCORE: 14

## 2022-01-07 ASSESSMENT — PATIENT HEALTH QUESTIONNAIRE - PHQ9: SUM OF ALL RESPONSES TO PHQ QUESTIONS 1-9: 21
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
## 2022-01-07 NOTE — PATIENT INSTRUCTIONS
-Start Effexor XR 37.5 mg daily for depression.  Please monitor blood pressure at least 2 times a week and report it next time.  If your blood pressure is over 150/90, please contact emily immediately.  -Lexapro is discontinued as you are not taking the medication.  -Continue all other medications for now.    -Contact your primary care at [facility] to request EKG to be ordered so you can get it done with your lab.    Your next appointment is scheduled on 2/10/2022 (THu) at 1pm.    Thank you for coming to the [facility] [place] [facility].    Lab Testing:  If you had lab testing today and your results are reassuring or normal they will be mailed to you or sent through [employer] within 7 days. If the lab tests need quick action we will call you with the results. The phone number we will call with results is # [phone] (home) . If this is not the best number please call our clinic and change the number.    Medication Refills:  If you need any refills please call your pharmacy and they will contact us. Our fax number for refills is [phone]. Please allow three business for refill processing. If you need to  your refill at a new pharmacy, please contact the new pharmacy directly. The new pharmacy will help you get your medications transferred.     Scheduling:  If you have any concerns about today's visit or wish to schedule another appointment please call our office during normal business hours [phone] (8-5:00 M-F)    Contact Us:  Please call [phone] during business hours (8-5:00 M-F).  If after clinic hours, or on the weekend, please call  [phone].    Financial Assistance [phone]  [facility] Billing [phone]  Central Billing Office, [facility]: [phone]  [facility] Billing [phone]  Medical Records [phone]      MENTAL HEALTH CRISIS NUMBERS:  For a medical emergency please call  911 or go to the nearest ER.     [facility]:   [place]  [facility] -[phone]   Crisis Residence [facility] -[phone]   Walk-In Counseling Center [facility] -[phone]   COPE 24/7 [facility] Mobile Team -[phone] (adults)/[phone] (child)  CHILD: [facility] needs assessment team - [phone]      [facility]:   [facility] - [phone]   Walk-in counseling [facility] - [phone]   Walk-in counseling [facility] - [phone]   Crisis Residence [facility] - [phone]  Urgent Care Adult Mental Rwwqkf-[phone] mobile unit/ 24/7 crisis line    National Crisis Numbers:   National Suicide Prevention Lifeline: 6-987-633-TALK (675-936-6789)  Poison Control Center - 1-248.601.4333  [employer]/resources for a list of additional resources (SOS)  Trans Lifeline a hotline for transgender people 1-198.645.3175  [employer] a hotline for LGBT youth 1-177.561.5741  Crisis Text Line: For any crisis 24/7   To: 692402  see www.crisistextline.org  - IF MAKING A CALL FEELS TOO HARD, send a text!         Again thank you for choosing [facility] [place] [facility] and please let us know how we can best partner with you to improve you and your family's health.    You may be receiving a survey regarding this appointment. We would love to have your feedback, both positive and negative. The survey is done by an external company, so your answers are anonymous.

## 2022-01-11 ENCOUNTER — VIRTUAL VISIT (OUTPATIENT)
Dept: BEHAVIORAL HEALTH | Facility: HOSPITAL | Age: 47
End: 2022-01-11
Payer: COMMERCIAL

## 2022-01-11 DIAGNOSIS — F33.1 MODERATE EPISODE OF RECURRENT MAJOR DEPRESSIVE DISORDER (H): Primary | ICD-10-CM

## 2022-01-11 DIAGNOSIS — F41.9 ANXIETY: ICD-10-CM

## 2022-01-11 DIAGNOSIS — F90.0 ATTENTION DEFICIT HYPERACTIVITY DISORDER, INATTENTIVE TYPE, MODERATE: ICD-10-CM

## 2022-01-11 PROCEDURE — 90837 PSYTX W PT 60 MINUTES: CPT | Mod: GT,95 | Performed by: COUNSELOR

## 2022-01-11 NOTE — PROGRESS NOTES
Progress Note    Patient Name: Mason Crowe  Date: 1/11/22         Service Type: Individual      Session Start Time: 10:08pm  Session End Time: 11:17     Session Length: 69 minutes    Session #: 3    Attendees: Client attended alone    Service Modality:  Video Visit: Amwell      Provider verified identity through the following two step process.  Patient provided:  Patient is known previously to provider    Telemedicine Visit: The patient's condition can be safely assessed and treated via synchronous audio and visual telemedicine encounter.      Reason for Telemedicine Visit: Services only offered telehealth    Originating Site (Patient Location): Patient's home    Distant Site (Provider Location): Provider Remote Setting- Home Office    Consent:  The patient/guardian has verbally consented to: the potential risks and benefits of telemedicine (video visit) versus in person care; bill my insurance or make self-payment for services provided; and responsibility for payment of non-covered services.     Patient would like the video invitation sent by:  Send to e-mail at: [email or web address]    Mode of Communication:  Video Conference via Amwell    As the provider I attest to compliance with applicable laws and regulations related to telemedicine.     Treatment Plan Last Reviewed: 11/8/21  PHQ-9 / QUIN-7 :   PHQ-9 score:    PHQ 1/6/2022   PHQ-9 Total Score 21   Q9: Thoughts of better off dead/self-harm past 2 weeks Not at all     QUIN-7 SCORE 10/18/2021 11/29/2021 1/6/2022   Total Score - 9 (mild anxiety) 14 (moderate anxiety)   Total Score 15 9 14       DATA  Interactive Complexity: Yes, visit entailed Interactive Complexity evidenced by:  -The need to manage maladaptive communication (related to, e.g., high anxiety, high reactivity, repeated questions, or disagreement) among participants that complicates delivery of care  Crisis: No       Progress Since Last Session (Related  to Symptoms / Goals / Homework):   Symptoms: Worsening pt reports that her anxiety and depression have been worse these past couple weeks and she has been isolating a lot more.    Homework: Did not complete      Episode of Care Goals: Minimal progress - CONTEMPLATION (Considering change and yet undecided); Intervened by assessing the negative and positive thinking (ambivalence) about behavior change     Current / Ongoing Stressors and Concerns:   Pt is currently seeking therapy due to ongoing depression and ADHD related symptoms. Pt reported completing her ADHD assessment and has started medication. Pt talked with her PCP and is now starting a new med for her depression/anxiety. Pt talked about her frustration points and recent difficulties since their last session, especailly those that have been ongoing stressors. Pt talked about school and work and that she hasn't been able to start either of them yet, which has been exceedingly frustrating for her. Pt talked about her past to give context to therapist related to her depression/anxiety and alcoholism, including past cries for help. Pt did identify that she internalized a lot of her past experiences and has guilt related to not being there for her son due to her past behavior which contributes to her depression. While talking about this pt expressed but did not acknowledge that she struggles with self-compassion/forgiveness, when she is unable to manage her MH symptoms. Pt was able to verbalize that a source of her current MH worsening is feeling like she is running out of time and that she is in survival mode.    Throughout session, pt was very talkative and it was clear she had a lot on her mind and going on in her life. Pt's presentation was that of distress, likely a combination of high anxiety and depression based anger/frustration at herself. Pt stated she is most worried about falling behind on bills (ie. Not having money) and unable to support her mom like  she wants. Pt admitted that she wanted to talk about her depression initially, being frustrated with herself because she tangential talked about her concentration and task completion issues instead. Compared to previous sessions, pt was able to self redirect back to current topic more-so then previously and appeared to have overall better insight by end of the appointment. Therapist needed extra time with pt during session due to pt's elevated emotional state and pro-longed break since last session as pt had a lot to review/say.      Treatment Objective(s) Addressed in This Session:   identify current stressors which contribute to feelings of depression  identify current fears / thoughts that contribute to feeling anxious  Identify negative self-talk and behaviors: challenge core beliefs, myths, and actions  Improve concentration, focus, and mindfulness in daily activities      Intervention:   Motivational Interviewing    MI Intervention: Expressed Empathy/Understanding, Supported Autonomy, Collaboration, Evocation, Open-ended questions and Reflections: simple and complex     Change Talk Expressed by the Patient: Desire to change Ability to change Reasons to change Taking steps    Provider Response to Change Talk: E - Evoked more info from patient about behavior change, A - Affirmed patient's thoughts, decisions, or attempts at behavior change, R - Reflected patient's change talk and S - Summarized patient's change talk statements     ASSESSMENT: Current Emotional / Mental Status (status of significant symptoms):   Risk status (Self / Other harm or suicidal ideation)   Patient denies current fears or concerns for personal safety.   Patient denies current or recent suicidal ideation or behaviors.   Patient denies current or recent homicidal ideation or behaviors.   Patient denies current or recent self injurious behavior or ideation.   Patient denies other safety concerns.   Patient reports there has been no change  in risk factors since their last session.     Patient reports there has been no change in protective factors since their last session.     Recommended that patient call 911 or go to the local ED should there be a change in any of these risk factors.     Appearance:   Appropriate    Eye Contact:   Fair    Psychomotor Behavior: Normal    Attitude:   Cooperative  [place]   Orientation:   All   Speech    Rate / Production: Normal     Volume:  Normal    Mood:    Anxious  Depressed  Normal   Affect:    Appropriate    Thought Content:  Clear    Thought Form:  Coherent  Logical  Tangential  [place]   Insight:    Fair  and Intellectual Insight     Medication Review:   Changes to psychiatric medications, see updated Medication List in EPIC.      Medication Compliance:   Yes pt just started a new anti-depression med and starts it tomorrow.     Changes in Health Issues:   None reported     Chemical Use Review:   Substance Use: Chemical use reviewed, no active concerns identified      Tobacco Use: No change in amount of tobacco use since last session.  Patient declined discussion at this time    Diagnosis:  1. Moderate episode of recurrent major depressive disorder (H)    2. Attention deficit hyperactivity disorder, inattentive type, moderate    3. Anxiety        Collateral Reports Completed:   Not Applicable    PLAN: (Patient Tasks / Therapist Tasks / Other)  Attend college appointment  Write down pros and cons list of prioritizing work or school  Research alternate school options  Watch Mindfulness video sent via [employer]  Attend AA meeting    Therapist will send midnfulness info and practice video via [employer] for pt to review.    Chavez Farmer                                                         ______________________________________________________________________    Treatment Plan    Patient's Name: Mason Crowe  YOB: 1975    Date: 11/8/21    DSM5 Diagnoses: Attention-Deficit/Hyperactivity  Disorder  314.00 (F90.0) Predominantly inattentive presentation or 296.32 (F33.1) Major Depressive Disorder, Recurrent Episode, Moderate _ and With anxious distress  Psychosocial / Contextual Factors: pt's inability to complete tasks and focus to the point she can't work or help her mother, which is likely contributing to her depressive symptoms. Pt is currently undergoing ADHD assessment that still needs to be finished, but pt has not been able to focus on task related to it to complete it.    WHODAS:   WHODAS 2.0 Total Score 10/12/2021   Total Score 45   Total Score MyChart 45       Referral / Collaboration:  Referral to another professional/service is not indicated at this time..  Therapist will encourage pt to continue following up with her ADHD assessment/ and psychiatry/med provider to that she was working with prior to initial therapy visit    Anticipated number of session or this episode of care: Pt is engaging in ongoing weekly therapy.      MeasurableTreatment Goal(s) related to diagnosis / functional impairment(s)  Goal 1: Patient will improve in task completion rate and organization.    I will know I've met my goal when I am able to get things done in a single day.      Objective #A Use daily planner each day to identify tasks to complete.    Patient will use daily planner 50% of the time  Increase interest, engagement, and pleasure in doing things  Improve concentration, focus, and mindfulness in daily activities .  Status: New - Date: 11/8/21     Intervention(s)  Therapist will assign homework between each session related to topic matter  teach strategies related to behavior activitation and habit forming skills/strategies.    Objective #B Listen to music or other simultaneous activity to improve concentration/focus  Patient will identify activites she can do simultaniously to help her stay on task  Increase interest, engagement, and pleasure in doing things  Identify negative self-talk and  behaviors: challenge core beliefs, myths, and actions  Feel less fidgety, restless or slow in daily activities / interpersonal interactions.  Status: New - Date: 11/8/21     Intervention(s)  Therapist will assign homework between session related to topic matter.  Assist in identifying and developing cocurrent behaviors that are conducive to staying on task.    Objective #C Complete follow-through on commitments   Patient will complete her ADHD assessment and paperwork related to ongoing sobriety/education/work related objectives.  Status: New - Date: 11/8/21     Intervention(s)  Therapist will assign homework between sessions related to topic matter  make referrals to psychiatry and ADHD   assist in identifying barriers to success.      Goal 2: Patient will improve quality of sleep and reduce daytime sleepiness.    I will know I've met my goal when I can stay awake during at work and get to bed at a consistent time.      Objective #A Use CPAP machine consistently throughout the week.    Status: New - Date: 11/8/21     Patient will Improve quantity and quality of night time sleep / decrease daytime naps  Feel less tired and more energy during the day .    Intervention(s)  Therapist will assign homework between session related to topic.  provide educational materials on sleep hygiene strategies and the impact of sleep on health.    Objective #B Develop a sleep routine/habits that better supports a work/life balance  Patient will identify and use habit/routine forming strategies to improve organization  Improve quantity and quality of night time sleep / decrease daytime naps  Feel less tired and more energy during the day   Improve concentration, focus, and mindfulness in daily activities .    Status: New - Date: 11/8/21     Intervention(s)  Therapist will assign homework between session related to topic matter.  teach the client how to perform a behavioral chain analysis. Developing strategies to promote  better sleep routine..      Goal 3: Patiient will have fewer days of feeling hopeless or worthless    I will know I've met my goal when I am able to not get stuck in feelings of depression.      Objective #A Better manage feelings of guilt and sadness related to past   Status: New - Date: 11/8/21     Patient will Decrease frequency and intensity of feeling down, depressed, hopeless  Identify negative self-talk and behaviors: challenge core beliefs, myths, and actions.    Intervention(s)  Therapist will assign homework between session related to topic matter  teach emotional recognition/identification. recongizing triggers and ways to be proactive in coping with them..    Patient has reviewed and agreed to the above plan.      Chavez Farmer  November 8, 2021

## 2022-01-18 ENCOUNTER — VIRTUAL VISIT (OUTPATIENT)
Dept: BEHAVIORAL HEALTH | Facility: HOSPITAL | Age: 47
End: 2022-01-18
Payer: COMMERCIAL

## 2022-01-18 DIAGNOSIS — F41.9 ANXIETY: ICD-10-CM

## 2022-01-18 DIAGNOSIS — F33.1 MODERATE EPISODE OF RECURRENT MAJOR DEPRESSIVE DISORDER (H): Primary | ICD-10-CM

## 2022-01-18 DIAGNOSIS — F90.0 ATTENTION DEFICIT HYPERACTIVITY DISORDER, INATTENTIVE TYPE, MODERATE: ICD-10-CM

## 2022-01-18 PROCEDURE — 90834 PSYTX W PT 45 MINUTES: CPT | Mod: GT,95 | Performed by: COUNSELOR

## 2022-01-18 NOTE — PROGRESS NOTES
Progress Note    Patient Name: Mason Crowe  Date: 1/18/22         Service Type: Individual      Session Start Time: 10:12pm  Session End Time: 10:59     Session Length: 47 minutes    Session #: 4    Attendees: Client attended alone    Service Modality:  Video Visit: Amwell      Provider verified identity through the following two step process.  Patient provided:  Patient is known previously to provider    Telemedicine Visit: The patient's condition can be safely assessed and treated via synchronous audio and visual telemedicine encounter.      Reason for Telemedicine Visit: Services only offered telehealth    Originating Site (Patient Location): Patient's home    Distant Site (Provider Location): Provider Remote Setting- Home Office    Consent:  The patient/guardian has verbally consented to: the potential risks and benefits of telemedicine (video visit) versus in person care; bill my insurance or make self-payment for services provided; and responsibility for payment of non-covered services.     Patient would like the video invitation sent by:  Send to e-mail at: [email or web address]    Mode of Communication:  Video Conference via Amwell    As the provider I attest to compliance with applicable laws and regulations related to telemedicine.     Treatment Plan Last Reviewed: 11/8/21  PHQ-9 / QUIN-7 :   PHQ-9 score:    PHQ 1/6/2022   PHQ-9 Total Score 21   Q9: Thoughts of better off dead/self-harm past 2 weeks Not at all     QUIN-7 SCORE 10/18/2021 11/29/2021 1/6/2022   Total Score - 9 (mild anxiety) 14 (moderate anxiety)   Total Score 15 9 14       DATA  Interactive Complexity: Yes, visit entailed Interactive Complexity evidenced by:  -The need to manage maladaptive communication (related to, e.g., high anxiety, high reactivity, repeated questions, or disagreement) among participants that complicates delivery of care  Crisis: No       Progress Since Last Session (Related  to Symptoms / Goals / Homework):   Symptoms: Improving symptoms, anxiety and depression has been better.    Homework: Partially completed      Episode of Care Goals: Minimal progress - PREPARATION (Decided to change - considering how); Intervened by negotiating a change plan and determining options / strategies for behavior change, identifying triggers, exploring social supports, and working towards setting a date to begin behavior change     Current / Ongoing Stressors and Concerns:   Pt is currently seeking therapy due to ongoing depression and ADHD related symptoms. Since last session, pt reported that due to her mom, pt's bank account has gone negative. This caused pt a lot of anger and distress as this will make her have a bunch of overcharges. Pt also reflected on how much help she tries to provide her mom, and feels that her mom takes advantage of the situation. Pt talked about her financial stressors and where she is at deciding on work vs. school. Pt is thinking that she wants to prioritize work due to her financial stressors, processing her deciding process by thinking about job options and how she can potentially balance both work and school. Pt overall had a better week and appeared to be more on track this week with her goals, then previously. However, pt stated that she learned about her mom causing pt financial issues today which has made today specifically difficult. Due to technical difficulties the appointment was finished via phone, but was mostly done via amwel. Future appointments will be done by phone due to ongoing technical difficulties every session despite pt's best efforts.      Treatment Objective(s) Addressed in This Session:   identify current stressors which contribute to feelings of depression  identify current fears / thoughts that contribute to feeling anxious  Identify negative self-talk and behaviors: challenge core beliefs, myths, and actions  Improve concentration, focus, and  mindfulness in daily activities      Intervention:   Solution Focused: reviewing current stressors and potential strategies to address them.     Motivational Interviewing    MI Intervention: Expressed Empathy/Understanding, Supported Autonomy, Collaboration, Evocation, Open-ended questions and Reflections: simple and complex     Change Talk Expressed by the Patient: Desire to change Ability to change Reasons to change Taking steps    Provider Response to Change Talk: E - Evoked more info from patient about behavior change, A - Affirmed patient's thoughts, decisions, or attempts at behavior change, R - Reflected patient's change talk and S - Summarized patient's change talk statements     ASSESSMENT: Current Emotional / Mental Status (status of significant symptoms):   Risk status (Self / Other harm or suicidal ideation)   Patient denies current fears or concerns for personal safety.   Patient denies current or recent suicidal ideation or behaviors.   Patient denies current or recent homicidal ideation or behaviors.   Patient denies current or recent self injurious behavior or ideation.   Patient denies other safety concerns.   Patient reports there has been no change in risk factors since their last session.     Patient reports there has been no change in protective factors since their last session.     Recommended that patient call 911 or go to the local ED should there be a change in any of these risk factors.     Appearance:   Appropriate    Eye Contact:   Fair    Psychomotor Behavior: Normal    Attitude:   Cooperative  [place]   Orientation:   All   Speech    Rate / Production: Normal     Volume:  Normal    Mood:    Angry  Normal   Affect:    Appropriate  Worrisome    Thought Content:  Clear    Thought Form:  Logical  [place]   Insight:    Fair  and Intellectual Insight     Medication Review:   Changes to psychiatric medications, see updated Medication List in EPIC.      Medication Compliance:   Yes pt  just started a new anti-depression med and starts it tomorrow.     Changes in Health Issues:   None reported     Chemical Use Review:   Substance Use: Chemical use reviewed, no active concerns identified      Tobacco Use: No change in amount of tobacco use since last session.  Patient declined discussion at this time    Diagnosis:  1. Moderate episode of recurrent major depressive disorder (H)    2. Attention deficit hyperactivity disorder, inattentive type, moderate    3. Anxiety        Collateral Reports Completed:   Not Applicable    PLAN: (Patient Tasks / Therapist Tasks / Other)  Review pros and cons list of prioritizing work or school  Talk with mom about the financial issues and/or separate your accounts completely.  Research alternate school options  Watch Mindfulness video sent via [employer]   Attend AA meeting    Chavez Farmer                                                         ______________________________________________________________________    Treatment Plan    Patient's Name: Mason Crowe  YOB: 1975    Date: 11/8/21    DSM5 Diagnoses: Attention-Deficit/Hyperactivity Disorder  314.00 (F90.0) Predominantly inattentive presentation or 296.32 (F33.1) Major Depressive Disorder, Recurrent Episode, Moderate _ and With anxious distress  Psychosocial / Contextual Factors: pt's inability to complete tasks and focus to the point she can't work or help her mother, which is likely contributing to her depressive symptoms. Pt is currently undergoing ADHD assessment that still needs to be finished, but pt has not been able to focus on task related to it to complete it.    WHODAS:   WHODAS 2.0 Total Score 10/12/2021   Total Score 45   Total Score [place] 45       Referral / Collaboration:  Referral to another professional/service is not indicated at this time..  Therapist will encourage pt to continue following up with her ADHD assessment/ and psychiatry/med provider to that she was  working with prior to initial therapy visit    Anticipated number of session or this episode of care: Pt is engaging in ongoing weekly therapy.      MeasurableTreatment Goal(s) related to diagnosis / functional impairment(s)  Goal 1: Patient will improve in task completion rate and organization.    I will know I've met my goal when I am able to get things done in a single day.      Objective #A Use daily planner each day to identify tasks to complete.    Patient will use daily planner 50% of the time  Increase interest, engagement, and pleasure in doing things  Improve concentration, focus, and mindfulness in daily activities .  Status: New - Date: 11/8/21     Intervention(s)  Therapist will assign homework between each session related to topic matter  teach strategies related to behavior activitation and habit forming skills/strategies.    Objective #B Listen to music or other simultaneous activity to improve concentration/focus  Patient will identify activites she can do simultaniously to help her stay on task  Increase interest, engagement, and pleasure in doing things  Identify negative self-talk and behaviors: challenge core beliefs, myths, and actions  Feel less fidgety, restless or slow in daily activities / interpersonal interactions.  Status: New - Date: 11/8/21     Intervention(s)  Therapist will assign homework between session related to topic matter.  Assist in identifying and developing cocurrent behaviors that are conducive to staying on task.    Objective #C Complete follow-through on commitments   Patient will complete her ADHD assessment and paperwork related to ongoing sobriety/education/work related objectives.  Status: New - Date: 11/8/21     Intervention(s)  Therapist will assign homework between sessions related to topic matter  make referrals to psychiatry and ADHD   assist in identifying barriers to success.      Goal 2: Patient will improve quality of sleep and reduce daytime  sleepiness.    I will know I've met my goal when I can stay awake during at work and get to bed at a consistent time.      Objective #A Use CPAP machine consistently throughout the week.    Status: New - Date: 11/8/21     Patient will Improve quantity and quality of night time sleep / decrease daytime naps  Feel less tired and more energy during the day .    Intervention(s)  Therapist will assign homework between session related to topic.  provide educational materials on sleep hygiene strategies and the impact of sleep on health.    Objective #B Develop a sleep routine/habits that better supports a work/life balance  Patient will identify and use habit/routine forming strategies to improve organization  Improve quantity and quality of night time sleep / decrease daytime naps  Feel less tired and more energy during the day   Improve concentration, focus, and mindfulness in daily activities .    Status: New - Date: 11/8/21     Intervention(s)  Therapist will assign homework between session related to topic matter.  teach the client how to perform a behavioral chain analysis. Developing strategies to promote better sleep routine..      Goal 3: Patiient will have fewer days of feeling hopeless or worthless    I will know I've met my goal when I am able to not get stuck in feelings of depression.      Objective #A Better manage feelings of guilt and sadness related to past   Status: New - Date: 11/8/21     Patient will Decrease frequency and intensity of feeling down, depressed, hopeless  Identify negative self-talk and behaviors: challenge core beliefs, myths, and actions.    Intervention(s)  Therapist will assign homework between session related to topic matter  teach emotional recognition/identification. recongizing triggers and ways to be proactive in coping with them..    Patient has reviewed and agreed to the above plan.      Chavez Farmer  November 8, 2021   DISPLAY PLAN FREE TEXT

## 2022-01-25 ENCOUNTER — VIRTUAL VISIT (OUTPATIENT)
Dept: BEHAVIORAL HEALTH | Facility: HOSPITAL | Age: 47
End: 2022-01-25
Payer: COMMERCIAL

## 2022-01-25 DIAGNOSIS — F90.0 ATTENTION DEFICIT HYPERACTIVITY DISORDER, INATTENTIVE TYPE, MODERATE: ICD-10-CM

## 2022-01-25 DIAGNOSIS — F33.1 MODERATE EPISODE OF RECURRENT MAJOR DEPRESSIVE DISORDER (H): Primary | ICD-10-CM

## 2022-01-25 PROCEDURE — 90834 PSYTX W PT 45 MINUTES: CPT | Mod: TEL,95 | Performed by: COUNSELOR

## 2022-01-25 NOTE — PROGRESS NOTES
"                                           Progress Note    Patient Name: Mason Crowe  Date: 1/25/22         Service Type: Individual      Session Start Time: 10:00am  Session End Time: 10:52am     Session Length: 52 minutes    Session #: 5    Attendees: Client attended alone    Service Modality:  Phone Visit: [phone]      Provider verified identity through the following two step process.  Patient provided:  Patient is known previously to provider    The patient has been notified of the following:      \"We have found that certain health care needs can be provided without the need for a face to face visit.  This service lets us provide the care you need with a phone conversation.       I will have full access to your [facility] medical record during this entire phone call.   I will be taking notes for your medical record.      Since this is like an office visit, we will bill your insurance company for this service.       There are potential benefits and risks of telephone visits (e.g. limits to patient confidentiality) that differ from in-person visits.?Confidentiality still applies for telephone services, and nobody will record the visit.  It is important to be in a quiet, private space that is free of distractions (including cell phone or other devices) during the visit.??      If during the course of the call I believe a telephone visit is not appropriate, you will not be charged for this service\"     Consent has been obtained for this service by care team member: Yes      Treatment Plan Last Reviewed: 11/8/21  PHQ-9 / QUIN-7 :   PHQ-9 score:    PHQ 1/6/2022   PHQ-9 Total Score 21   Q9: Thoughts of better off dead/self-harm past 2 weeks Not at all     QUIN-7 SCORE 10/18/2021 11/29/2021 1/6/2022   Total Score - 9 (mild anxiety) 14 (moderate anxiety)   Total Score 15 9 14       DATA  Interactive Complexity: No  Crisis: No       Progress Since Last Session (Related to Symptoms / Goals / " Homework):   Symptoms: Improving symptoms, anxiety and depression has been better.    Homework: Partially completed      Episode of Care Goals: Minimal progress - PREPARATION (Decided to change - considering how); Intervened by negotiating a change plan and determining options / strategies for behavior change, identifying triggers, exploring social supports, and working towards setting a date to begin behavior change     Current / Ongoing Stressors and Concerns:   Pt is currently seeking therapy due to ongoing depression and ADHD related symptoms. Since last session, pt reported that her mom has contracted covid, but is doing OK. Pt also reported that her mood has continued to be better this week, and that she felt productive these past few days which made her feel much better. Pt did recognize that while she wasn't as productive as she would like to have been, she has been productive this week which she was able to recognize as a positive. Pt reflected on what was discussed last week, and agreed that she needs to take care of yourself before she can be successful at work or school. Pt processed through her most recent AA meeting and her interactions there, realizing that she finally said things that she hadn't said before which made her feel much better. Pt recognized that while some of what she said made other members there uncomfortable because she was being blunt and honest about being an addict that she feels other members were not willing to face/say. Pt also said that while talking she relived some of what she was talking about, . Pt also discussed how she needs to develop patience for people that frustrate her and would like to process her past more once she is better able to manage her immediate concerns, as she thinks a lot of her ongoing struggles may be based in not dealing with or handling her past.       Treatment Objective(s) Addressed in This Session:   identify current stressors which contribute to  feelings of depression  identify current fears / thoughts that contribute to feeling anxious  Identify negative self-talk and behaviors: challenge core beliefs, myths, and actions  Improve concentration, focus, and mindfulness in daily activities      Intervention:   CBT: reviewed triangle.   Solution Focused: reviewing current stressors and potential strategies to address them.     Motivational Interviewing    MI Intervention: Expressed Empathy/Understanding, Supported Autonomy, Collaboration, Evocation, Open-ended questions and Reflections: simple and complex     Change Talk Expressed by the Patient: Desire to change Ability to change Reasons to change Taking steps    Provider Response to Change Talk: E - Evoked more info from patient about behavior change, A - Affirmed patient's thoughts, decisions, or attempts at behavior change, R - Reflected patient's change talk and S - Summarized patient's change talk statements     ASSESSMENT: Current Emotional / Mental Status (status of significant symptoms):   Risk status (Self / Other harm or suicidal ideation)   Patient denies current fears or concerns for personal safety.   Patient denies current or recent suicidal ideation or behaviors.   Patient denies current or recent homicidal ideation or behaviors.   Patient denies current or recent self injurious behavior or ideation.   Patient denies other safety concerns.   Patient reports there has been no change in risk factors since their last session.     Patient reports there has been no change in protective factors since their last session.     Recommended that patient call 911 or go to the local ED should there be a change in any of these risk factors.     Appearance:   Unable to assess    Eye Contact:   Unable to assess    Psychomotor Behavior: Unable to assess    Attitude:   Cooperative  Interested [place]   Orientation:   All   Speech    Rate / Production: Normal/ Responsive  Talkative    Volume:  Normal    Mood:    Anxious  Normal   Affect:    Appropriate    Thought Content:  Clear    Thought Form:  Coherent  Logical    Insight:    Good      Medication Review:   Changes to psychiatric medications, see updated Medication List in EPIC.      Medication Compliance:   Yes pt just started a new anti-depression med and starts it tomorrow.     Changes in Health Issues:   None reported     Chemical Use Review:   Substance Use: Chemical use reviewed, no active concerns identified      Tobacco Use: No change in amount of tobacco use since last session.  Patient declined discussion at this time    Diagnosis:  1. Moderate episode of recurrent major depressive disorder (H)    2. Attention deficit hyperactivity disorder, inattentive type, moderate        Collateral Reports Completed:   Not Applicable    PLAN: (Patient Tasks / Therapist Tasks / Other)  Use C-pap machine when it arrives  Attend AA meeting and talk with sponsor about your question related to disease  Watch Mindfulness video sent via [employer]  Try to go to bed and get up at a consistent time    Follow-up on next time:  Review pros and cons list of prioritizing work or school  Talk with mom about the financial issues and/or separate your accounts completely.  Research alternate school options        Chavez Farmer                                                         ______________________________________________________________________    Treatment Plan    Patient's Name: Mason Crowe  YOB: 1975    Date: 11/8/21    DSM5 Diagnoses: Attention-Deficit/Hyperactivity Disorder  314.00 (F90.0) Predominantly inattentive presentation or 296.32 (F33.1) Major Depressive Disorder, Recurrent Episode, Moderate _ and With anxious distress  Psychosocial / Contextual Factors: pt's inability to complete tasks and focus to the point she can't work or help her mother, which is likely contributing to her depressive symptoms. Pt is  currently undergoing ADHD assessment that still needs to be finished, but pt has not been able to focus on task related to it to complete it.    WHODAS:   WHODAS 2.0 Total Score 10/12/2021   Total Score 45   Total Score MyChart 45       Referral / Collaboration:  Referral to another professional/service is not indicated at this time..  Therapist will encourage pt to continue following up with her ADHD assessment/ and psychiatry/med provider to that she was working with prior to initial therapy visit    Anticipated number of session or this episode of care: Pt is engaging in ongoing weekly therapy.      MeasurableTreatment Goal(s) related to diagnosis / functional impairment(s)  Goal 1: Patient will improve in task completion rate and organization.    I will know I've met my goal when I am able to get things done in a single day.      Objective #A Use daily planner each day to identify tasks to complete.    Patient will use daily planner 50% of the time  Increase interest, engagement, and pleasure in doing things  Improve concentration, focus, and mindfulness in daily activities .  Status: New - Date: 11/8/21     Intervention(s)  Therapist will assign homework between each session related to topic matter  teach strategies related to behavior activitation and habit forming skills/strategies.    Objective #B Listen to music or other simultaneous activity to improve concentration/focus  Patient will identify activites she can do simultaniously to help her stay on task  Increase interest, engagement, and pleasure in doing things  Identify negative self-talk and behaviors: challenge core beliefs, myths, and actions  Feel less fidgety, restless or slow in daily activities / interpersonal interactions.  Status: New - Date: 11/8/21     Intervention(s)  Therapist will assign homework between session related to topic matter.  Assist in identifying and developing cocurrent behaviors that are conducive to staying on  task.    Objective #C Complete follow-through on commitments   Patient will complete her ADHD assessment and paperwork related to ongoing sobriety/education/work related objectives.  Status: New - Date: 11/8/21     Intervention(s)  Therapist will assign homework between sessions related to topic matter  make referrals to psychiatry and ADHD   assist in identifying barriers to success.      Goal 2: Patient will improve quality of sleep and reduce daytime sleepiness.    I will know I've met my goal when I can stay awake during at work and get to bed at a consistent time.      Objective #A Use CPAP machine consistently throughout the week.    Status: New - Date: 11/8/21     Patient will Improve quantity and quality of night time sleep / decrease daytime naps  Feel less tired and more energy during the day .    Intervention(s)  Therapist will assign homework between session related to topic.  provide educational materials on sleep hygiene strategies and the impact of sleep on health.    Objective #B Develop a sleep routine/habits that better supports a work/life balance  Patient will identify and use habit/routine forming strategies to improve organization  Improve quantity and quality of night time sleep / decrease daytime naps  Feel less tired and more energy during the day   Improve concentration, focus, and mindfulness in daily activities .    Status: New - Date: 11/8/21     Intervention(s)  Therapist will assign homework between session related to topic matter.  teach the client how to perform a behavioral chain analysis. Developing strategies to promote better sleep routine..      Goal 3: Patiient will have fewer days of feeling hopeless or worthless    I will know I've met my goal when I am able to not get stuck in feelings of depression.      Objective #A Better manage feelings of guilt and sadness related to past   Status: New - Date: 11/8/21     Patient will Decrease frequency and intensity of feeling  down, depressed, hopeless  Identify negative self-talk and behaviors: challenge core beliefs, myths, and actions.    Intervention(s)  Therapist will assign homework between session related to topic matter  teach emotional recognition/identification. recongizing triggers and ways to be proactive in coping with them..    Patient has reviewed and agreed to the above plan.      Chavez Farmer  November 8, 2021

## 2022-02-02 ENCOUNTER — VIRTUAL VISIT (OUTPATIENT)
Dept: BEHAVIORAL HEALTH | Facility: HOSPITAL | Age: 47
End: 2022-02-02
Payer: COMMERCIAL

## 2022-02-02 DIAGNOSIS — F90.0 ATTENTION DEFICIT HYPERACTIVITY DISORDER, INATTENTIVE TYPE, MODERATE: ICD-10-CM

## 2022-02-02 DIAGNOSIS — F33.1 MODERATE EPISODE OF RECURRENT MAJOR DEPRESSIVE DISORDER (H): Primary | ICD-10-CM

## 2022-02-02 DIAGNOSIS — F41.9 ANXIETY: ICD-10-CM

## 2022-02-02 PROCEDURE — 90834 PSYTX W PT 45 MINUTES: CPT | Mod: TEL,95 | Performed by: COUNSELOR

## 2022-02-02 NOTE — PROGRESS NOTES
"                                           Progress Note    Patient Name: Mason Crowe  Date: 2/02/22         Service Type: Individual      Session Start Time: 3:02 am  Session End Time: 3:54 am     Session Length: 52 minutes    Session #: 6    Attendees: Client attended alone    Service Modality:  Phone Visit: [phone]      Provider verified identity through the following two step process.  Patient provided:  Patient is known previously to provider    The patient has been notified of the following:      \"We have found that certain health care needs can be provided without the need for a face to face visit.  This service lets us provide the care you need with a phone conversation.       I will have full access to your [facility] medical record during this entire phone call.   I will be taking notes for your medical record.      Since this is like an office visit, we will bill your insurance company for this service.       There are potential benefits and risks of telephone visits (e.g. limits to patient confidentiality) that differ from in-person visits.?Confidentiality still applies for telephone services, and nobody will record the visit.  It is important to be in a quiet, private space that is free of distractions (including cell phone or other devices) during the visit.??      If during the course of the call I believe a telephone visit is not appropriate, you will not be charged for this service\"     Consent has been obtained for this service by care team member: Yes      Treatment Plan Last Reviewed: 11/8/21  PHQ-9 / QUIN-7 :   PHQ-9 score:    PHQ 1/6/2022   PHQ-9 Total Score 21   Q9: Thoughts of better off dead/self-harm past 2 weeks Not at all     QUIN-7 SCORE 10/18/2021 11/29/2021 1/6/2022   Total Score - 9 (mild anxiety) 14 (moderate anxiety)   Total Score 15 9 14       DATA  Interactive Complexity: No  Crisis: No       Progress Since Last Session (Related to Symptoms / Goals / " "Homework):   Symptoms: Improving symptoms, anxiety and depression has been better.    Homework: Did not complete      Episode of Care Goals: Satisfactory progress - PREPARATION (Decided to change - considering how); Intervened by negotiating a change plan and determining options / strategies for behavior change, identifying triggers, exploring social supports, and working towards setting a date to begin behavior change     Current / Ongoing Stressors and Concerns:   Pt is currently seeking therapy due to ongoing depression and ADHD related symptoms. Since last session, pt reported that her week has been going okay and that it hasn't been particularly difficult. Pt has been keeping herself very busy this week with cleaning out her apartment and moving things around, which has been exhausting but productive/helpful. Pt reported that due to being so busy she hasn't had much mental energy to expend on negative thoughts. Pt processed through the loss of her sponsor, due to poor fit and lack on regular contact. Pt is still planning to go to AA meetings still, but currently does not plan on getting another sponsor at this time. Pt processed how a recent interaction with her mother had been frustrating for her and expressed that she has a hard time understanding her mom. This was compounded by the fact that she hasn't been able to address the money/debt card situation because her mom's COVID stress and trying to address her emotional sensitivity. Pt processed through her recent interaction with a school counselor, who had asked about her past, and expressed concern related to pt's response when talking about past \"trauma\". Pt was able to identify after talking about it why they may have had expressed concerns about it, but for her she has already dealt with these events so they are not something she views as traumatic.    Self-care and forgiveness should be discussed in the future, and practicing patience are worth " discussing more in more.      Treatment Objective(s) Addressed in This Session:   identify current stressors which contribute to feelings of depression  identify current fears / thoughts that contribute to feeling anxious  Identify negative self-talk and behaviors: challenge core beliefs, myths, and actions  Improve concentration, focus, and mindfulness in daily activities      Intervention:   CBT: reviewed triangle.   Solution Focused: reviewing current stressors and potential strategies to address them.     Motivational Interviewing    MI Intervention: Expressed Empathy/Understanding, Supported Autonomy, Collaboration, Evocation, Open-ended questions and Reflections: simple and complex     Change Talk Expressed by the Patient: Desire to change Ability to change Reasons to change Taking steps    Provider Response to Change Talk: E - Evoked more info from patient about behavior change, A - Affirmed patient's thoughts, decisions, or attempts at behavior change, R - Reflected patient's change talk and S - Summarized patient's change talk statements     ASSESSMENT: Current Emotional / Mental Status (status of significant symptoms):   Risk status (Self / Other harm or suicidal ideation)   Patient denies current fears or concerns for personal safety.   Patient denies current or recent suicidal ideation or behaviors.   Patient denies current or recent homicidal ideation or behaviors.   Patient denies current or recent self injurious behavior or ideation.   Patient denies other safety concerns.   Patient reports there has been no change in risk factors since their last session.     Patient reports there has been no change in protective factors since their last session.     Recommended that patient call 911 or go to the local ED should there be a change in any of these risk factors.     Appearance:   Unable to assess    Eye Contact:   Unable to assess    Psychomotor Behavior: Unable to assess    Attitude:   Cooperative   Interested [place]   Orientation:   All   Speech    Rate / Production: Normal/ Responsive Talkative    Volume:  Normal    Mood:    Anxious  Normal   Affect:    Appropriate    Thought Content:  Clear    Thought Form:  Coherent  Logical  Tangential    Insight:    Good      Medication Review:   No changes to current psychiatric medication(s)     Medication Compliance:   Yes     Changes in Health Issues:   None reported     Chemical Use Review:   Substance Use: Chemical use reviewed, no active concerns identified      Tobacco Use: No change in amount of tobacco use since last session.  Patient declined discussion at this time    Diagnosis:  1. Moderate episode of recurrent major depressive disorder (H)    2. Attention deficit hyperactivity disorder, inattentive type, moderate    3. Anxiety        Collateral Reports Completed:   Not Applicable    PLAN: (Patient Tasks / Therapist Tasks / Other)  Use C-pap machine when it arrives, follow-up with sleep study  Continue to Attend AA meeting   Watch Mindfulness video sent via [employer]  Try to go to bed and get up at a consistent time  Follow-up with school advisor about classes    Follow-up on next time:  Talk with mom about the financial issues and/or separate your accounts completely.    Chavez Farmer                                                         ______________________________________________________________________    Treatment Plan    Patient's Name: Mason Crowe  YOB: 1975    Date: 11/8/21    DSM5 Diagnoses: Attention-Deficit/Hyperactivity Disorder  314.00 (F90.0) Predominantly inattentive presentation or 296.32 (F33.1) Major Depressive Disorder, Recurrent Episode, Moderate _ and With anxious distress  Psychosocial / Contextual Factors: pt's inability to complete tasks and focus to the point she can't work or help her mother, which is likely contributing to her depressive symptoms. Pt is currently undergoing ADHD assessment  that still needs to be finished, but pt has not been able to focus on task related to it to complete it.    WHODAS:   WHODAS 2.0 Total Score 10/12/2021   Total Score 45   Total Score MyChart 45       Referral / Collaboration:  Referral to another professional/service is not indicated at this time..  Therapist will encourage pt to continue following up with her ADHD assessment/ and psychiatry/med provider to that she was working with prior to initial therapy visit    Anticipated number of session or this episode of care: Pt is engaging in ongoing weekly therapy.      MeasurableTreatment Goal(s) related to diagnosis / functional impairment(s)  Goal 1: Patient will improve in task completion rate and organization.    I will know I've met my goal when I am able to get things done in a single day.      Objective #A Use daily planner each day to identify tasks to complete.    Patient will use daily planner 50% of the time  Increase interest, engagement, and pleasure in doing things  Improve concentration, focus, and mindfulness in daily activities .  Status: New - Date: 11/8/21     Intervention(s)  Therapist will assign homework between each session related to topic matter  teach strategies related to behavior activitation and habit forming skills/strategies.    Objective #B Listen to music or other simultaneous activity to improve concentration/focus  Patient will identify activites she can do simultaniously to help her stay on task  Increase interest, engagement, and pleasure in doing things  Identify negative self-talk and behaviors: challenge core beliefs, myths, and actions  Feel less fidgety, restless or slow in daily activities / interpersonal interactions.  Status: New - Date: 11/8/21     Intervention(s)  Therapist will assign homework between session related to topic matter.  Assist in identifying and developing cocurrent behaviors that are conducive to staying on task.    Objective #C Complete  follow-through on commitments   Patient will complete her ADHD assessment and paperwork related to ongoing sobriety/education/work related objectives.  Status: New - Date: 11/8/21     Intervention(s)  Therapist will assign homework between sessions related to topic matter  make referrals to psychiatry and ADHD   assist in identifying barriers to success.      Goal 2: Patient will improve quality of sleep and reduce daytime sleepiness.    I will know I've met my goal when I can stay awake during at work and get to bed at a consistent time.      Objective #A Use CPAP machine consistently throughout the week.    Status: New - Date: 11/8/21     Patient will Improve quantity and quality of night time sleep / decrease daytime naps  Feel less tired and more energy during the day .    Intervention(s)  Therapist will assign homework between session related to topic.  provide educational materials on sleep hygiene strategies and the impact of sleep on health.    Objective #B Develop a sleep routine/habits that better supports a work/life balance  Patient will identify and use habit/routine forming strategies to improve organization  Improve quantity and quality of night time sleep / decrease daytime naps  Feel less tired and more energy during the day   Improve concentration, focus, and mindfulness in daily activities .    Status: New - Date: 11/8/21     Intervention(s)  Therapist will assign homework between session related to topic matter.  teach the client how to perform a behavioral chain analysis. Developing strategies to promote better sleep routine..      Goal 3: Patiient will have fewer days of feeling hopeless or worthless    I will know I've met my goal when I am able to not get stuck in feelings of depression.      Objective #A Better manage feelings of guilt and sadness related to past   Status: New - Date: 11/8/21     Patient will Decrease frequency and intensity of feeling down, depressed,  hopeless  Identify negative self-talk and behaviors: challenge core beliefs, myths, and actions.    Intervention(s)  Therapist will assign homework between session related to topic matter  teach emotional recognition/identification. recongizing triggers and ways to be proactive in coping with them..    Patient has reviewed and agreed to the above plan.      Chavez Farmer  November 8, 2021

## 2022-02-06 ASSESSMENT — ANXIETY QUESTIONNAIRES
7. FEELING AFRAID AS IF SOMETHING AWFUL MIGHT HAPPEN: SEVERAL DAYS
5. BEING SO RESTLESS THAT IT IS HARD TO SIT STILL: NEARLY EVERY DAY
GAD7 TOTAL SCORE: 16
3. WORRYING TOO MUCH ABOUT DIFFERENT THINGS: NEARLY EVERY DAY
7. FEELING AFRAID AS IF SOMETHING AWFUL MIGHT HAPPEN: SEVERAL DAYS
1. FEELING NERVOUS, ANXIOUS, OR ON EDGE: SEVERAL DAYS
GAD7 TOTAL SCORE: 16
3. WORRYING TOO MUCH ABOUT DIFFERENT THINGS: NEARLY EVERY DAY
GAD7 TOTAL SCORE: 16
7. FEELING AFRAID AS IF SOMETHING AWFUL MIGHT HAPPEN: SEVERAL DAYS
GAD7 TOTAL SCORE: 16
GAD7 TOTAL SCORE: 16
2. NOT BEING ABLE TO STOP OR CONTROL WORRYING: NEARLY EVERY DAY
4. TROUBLE RELAXING: NEARLY EVERY DAY
6. BECOMING EASILY ANNOYED OR IRRITABLE: MORE THAN HALF THE DAYS
6. BECOMING EASILY ANNOYED OR IRRITABLE: MORE THAN HALF THE DAYS
1. FEELING NERVOUS, ANXIOUS, OR ON EDGE: SEVERAL DAYS
5. BEING SO RESTLESS THAT IT IS HARD TO SIT STILL: NEARLY EVERY DAY
7. FEELING AFRAID AS IF SOMETHING AWFUL MIGHT HAPPEN: SEVERAL DAYS
2. NOT BEING ABLE TO STOP OR CONTROL WORRYING: NEARLY EVERY DAY
4. TROUBLE RELAXING: NEARLY EVERY DAY
GAD7 TOTAL SCORE: 16

## 2022-02-07 ASSESSMENT — ANXIETY QUESTIONNAIRES
GAD7 TOTAL SCORE: 16
GAD7 TOTAL SCORE: 16

## 2022-02-08 ENCOUNTER — VIRTUAL VISIT (OUTPATIENT)
Dept: BEHAVIORAL HEALTH | Facility: HOSPITAL | Age: 47
End: 2022-02-08
Payer: COMMERCIAL

## 2022-02-08 DIAGNOSIS — F33.1 MODERATE EPISODE OF RECURRENT MAJOR DEPRESSIVE DISORDER (H): Primary | ICD-10-CM

## 2022-02-08 DIAGNOSIS — F90.0 ATTENTION DEFICIT HYPERACTIVITY DISORDER, INATTENTIVE TYPE, MODERATE: ICD-10-CM

## 2022-02-08 DIAGNOSIS — F41.9 ANXIETY: ICD-10-CM

## 2022-02-08 PROCEDURE — 90834 PSYTX W PT 45 MINUTES: CPT | Mod: TEL,95 | Performed by: COUNSELOR

## 2022-02-08 NOTE — PROGRESS NOTES
"                                           Progress Note    Patient Name: Mason Crowe  Date: 2/08/22         Service Type: Individual      Session Start Time: 10:01 am  Session End Time: 10:51 am     Session Length: 51 minutes    Session #: 7    Attendees: Client attended alone    Service Modality:  Phone Visit: [phone]      Provider verified identity through the following two step process.  Patient provided:  Patient is known previously to provider    The patient has been notified of the following:      \"We have found that certain health care needs can be provided without the need for a face to face visit.  This service lets us provide the care you need with a phone conversation.       I will have full access to your [facility] medical record during this entire phone call.   I will be taking notes for your medical record.      Since this is like an office visit, we will bill your insurance company for this service.       There are potential benefits and risks of telephone visits (e.g. limits to patient confidentiality) that differ from in-person visits.?Confidentiality still applies for telephone services, and nobody will record the visit.  It is important to be in a quiet, private space that is free of distractions (including cell phone or other devices) during the visit.??      If during the course of the call I believe a telephone visit is not appropriate, you will not be charged for this service\"     Consent has been obtained for this service by care team member: Yes      Treatment Plan Last Reviewed: 11/8/21  PHQ-9 / QUIN-7 :   PHQ-9 score:    PHQ 1/6/2022   PHQ-9 Total Score 21   Q9: Thoughts of better off dead/self-harm past 2 weeks Not at all       QUIN-7 SCORE 1/6/2022 2/6/2022 2/6/2022   Total Score 14 (moderate anxiety) - 16 (severe anxiety)   Total Score 14 16 16       DATA  Interactive Complexity: No  Crisis: No       Progress Since Last Session (Related to Symptoms / Goals / " Homework):   Symptoms: Worsening anxiety/ADHD symptoms, pt's depression symptoms appear to be the same as last week.     Homework: Achieved / completed to satisfaction      Episode of Care Goals: Satisfactory progress - PREPARATION (Decided to change - considering how); Intervened by negotiating a change plan and determining options / strategies for behavior change, identifying triggers, exploring social supports, and working towards setting a date to begin behavior change     Current / Ongoing Stressors and Concerns:   Pt is currently seeking therapy due to ongoing depression and ADHD related symptoms. Since last session, pt reported that she has been very stressed due to being unable to help support her mom and herself at this time. Pt expressed feeling scattered and unable to sit still, expressing that this week has been really hard with the extra stress she is feeling. Pt processed through possible solutions and why she is feeling this way. Pt was able to acknowledge how she is doing better then previously in handling her stress, mood wise, and was able to identify how her anxiety is based in worry about her finances. Pt is hoping to start school next week and is starting to focus on returning to work, but stated she was worried her depression might come back and then she would loose he job and school. After reflecting on everything, therapist and pt agree that her ADHD symptoms are likely stating to impact her more then her MDD symptoms, and her ADHD is making managing her anxiety more challenging. Pt still has some ongoing MDD symptoms based on her report, but overall they are doing much better then compared to when she started therapy, which pt agrees with. Pt at this time is just feeling overwhelmed and uncertain, due to her current stressors, both of which are very uncomfortable feelings for her to deal with.    Self-care and forgiveness should be discussed in the future, and practicing patience are worth  discussing more as well.      Treatment Objective(s) Addressed in This Session:   identify current stressors which contribute to feelings of depression  identify current fears / thoughts that contribute to feeling anxious  Identify negative self-talk and behaviors: challenge core beliefs, myths, and actions  Improve concentration, focus, and mindfulness in daily activities      Intervention:   CBT: reviewed triangle and current progress.   Solution Focused: reviewing current stressors and potential strategies to address them.     Motivational Interviewing    MI Intervention: Expressed Empathy/Understanding, Supported Autonomy, Collaboration, Evocation, Open-ended questions and Reflections: simple and complex     Change Talk Expressed by the Patient: Desire to change Ability to change Reasons to change Taking steps    Provider Response to Change Talk: E - Evoked more info from patient about behavior change, A - Affirmed patient's thoughts, decisions, or attempts at behavior change, R - Reflected patient's change talk and S - Summarized patient's change talk statements     ASSESSMENT: Current Emotional / Mental Status (status of significant symptoms):   Risk status (Self / Other harm or suicidal ideation)   Patient denies current fears or concerns for personal safety.   Patient denies current or recent suicidal ideation or behaviors.   Patient denies current or recent homicidal ideation or behaviors.   Patient denies current or recent self injurious behavior or ideation.   Patient denies other safety concerns.   Patient reports there has been no change in risk factors since their last session.     Patient reports there has been no change in protective factors since their last session.     Recommended that patient call 911 or go to the local ED should there be a change in any of these risk factors.     Appearance:   Unable to assess    Eye Contact:   Unable to assess    Psychomotor Behavior: Unable to assess     Attitude:   Cooperative  Interested [place]   Orientation:   All   Speech    Rate / Production: Normal/ Responsive Talkative    Volume:  Normal    Mood:    Anxious  Normal   Affect:    Appropriate    Thought Content:  Clear    Thought Form:  Coherent  Logical    Insight:    Good      Medication Review:   No changes to current psychiatric medication(s)     Medication Compliance:   Yes     Changes in Health Issues:   None reported     Chemical Use Review:   Substance Use: Chemical use reviewed, no active concerns identified      Tobacco Use: No change in amount of tobacco use since last session.  Patient declined discussion at this time    Diagnosis:  1. Moderate episode of recurrent major depressive disorder (H)    2. Attention deficit hyperactivity disorder, inattentive type, moderate    3. Anxiety        Collateral Reports Completed:   Not Applicable    PLAN: (Patient Tasks / Therapist Tasks / Other)  Continue to Attend AA meeting  Try to go to bed and get up at a consistent time  Start school next Monday  Talk to mom, including pt's sister, about finding a cheaper senior living apartment  Talk with provider about ADHD meds, possible increase    Therapist to check in about this when things are calmer:  Talk with mom about the financial issues and/or separate your accounts completely.    Chavez Farmer [facility], Provider Oversight:  Dr. Manjinder Thompson                               ______________________________________________________________________    Treatment Plan    Patient's Name: Mason Crowe  YOB: 1975    Date: 11/8/21    DSM5 Diagnoses: Attention-Deficit/Hyperactivity Disorder  314.00 (F90.0) Predominantly inattentive presentation or 296.32 (F33.1) Major Depressive Disorder, Recurrent Episode, Moderate _ and With anxious distress  Psychosocial / Contextual Factors: pt's inability to complete tasks and focus to the point she can't work or help her mother, which is likely  contributing to her depressive symptoms. Pt is currently undergoing ADHD assessment that still needs to be finished, but pt has not been able to focus on task related to it to complete it.    WHODAS:   WHODAS 2.0 Total Score 10/12/2021   Total Score 45   Total Score MyChart 45       Referral / Collaboration:  Referral to another professional/service is not indicated at this time..  Therapist will encourage pt to continue following up with her ADHD assessment/ and psychiatry/med provider to that she was working with prior to initial therapy visit    Anticipated number of session or this episode of care: Pt is engaging in ongoing weekly therapy.      MeasurableTreatment Goal(s) related to diagnosis / functional impairment(s)  Goal 1: Patient will improve in task completion rate and organization.    I will know I've met my goal when I am able to get things done in a single day.      Objective #A Use daily planner each day to identify tasks to complete.    Patient will use daily planner 50% of the time  Increase interest, engagement, and pleasure in doing things  Improve concentration, focus, and mindfulness in daily activities .  Status: New - Date: 11/8/21     Intervention(s)  Therapist will assign homework between each session related to topic matter  teach strategies related to behavior activitation and habit forming skills/strategies.    Objective #B Listen to music or other simultaneous activity to improve concentration/focus  Patient will identify activites she can do simultaniously to help her stay on task  Increase interest, engagement, and pleasure in doing things  Identify negative self-talk and behaviors: challenge core beliefs, myths, and actions  Feel less fidgety, restless or slow in daily activities / interpersonal interactions.  Status: New - Date: 11/8/21     Intervention(s)  Therapist will assign homework between session related to topic matter.  Assist in identifying and developing cocurrent  behaviors that are conducive to staying on task.    Objective #C Complete follow-through on commitments   Patient will complete her ADHD assessment and paperwork related to ongoing sobriety/education/work related objectives.  Status: New - Date: 11/8/21     Intervention(s)  Therapist will assign homework between sessions related to topic matter  make referrals to psychiatry and ADHD   assist in identifying barriers to success.      Goal 2: Patient will improve quality of sleep and reduce daytime sleepiness.    I will know I've met my goal when I can stay awake during at work and get to bed at a consistent time.      Objective #A Use CPAP machine consistently throughout the week.    Status: New - Date: 11/8/21     Patient will Improve quantity and quality of night time sleep / decrease daytime naps  Feel less tired and more energy during the day .    Intervention(s)  Therapist will assign homework between session related to topic.  provide educational materials on sleep hygiene strategies and the impact of sleep on health.    Objective #B Develop a sleep routine/habits that better supports a work/life balance  Patient will identify and use habit/routine forming strategies to improve organization  Improve quantity and quality of night time sleep / decrease daytime naps  Feel less tired and more energy during the day   Improve concentration, focus, and mindfulness in daily activities .    Status: New - Date: 11/8/21     Intervention(s)  Therapist will assign homework between session related to topic matter.  teach the client how to perform a behavioral chain analysis. Developing strategies to promote better sleep routine..      Goal 3: Patiient will have fewer days of feeling hopeless or worthless    I will know I've met my goal when I am able to not get stuck in feelings of depression.      Objective #A Better manage feelings of guilt and sadness related to past   Status: New - Date: 11/8/21     Patient will  Decrease frequency and intensity of feeling down, depressed, hopeless  Identify negative self-talk and behaviors: challenge core beliefs, myths, and actions.    Intervention(s)  Therapist will assign homework between session related to topic matter  teach emotional recognition/identification. recongizing triggers and ways to be proactive in coping with them..    Patient has reviewed and agreed to the above plan.      Chavez Farmer  November 8, 2021

## 2022-02-10 ENCOUNTER — TELEPHONE (OUTPATIENT)
Dept: PSYCHIATRY | Facility: CLINIC | Age: 47
End: 2022-02-10
Payer: COMMERCIAL

## 2022-02-10 ENCOUNTER — VIRTUAL VISIT (OUTPATIENT)
Dept: PSYCHIATRY | Facility: CLINIC | Age: 47
End: 2022-02-10
Attending: NURSE PRACTITIONER
Payer: COMMERCIAL

## 2022-02-10 DIAGNOSIS — F33.1 MODERATE EPISODE OF RECURRENT MAJOR DEPRESSIVE DISORDER (H): Primary | ICD-10-CM

## 2022-02-10 DIAGNOSIS — F41.9 ANXIETY: ICD-10-CM

## 2022-02-10 DIAGNOSIS — F10.21 ALCOHOL USE DISORDER, SEVERE, IN SUSTAINED REMISSION (H): ICD-10-CM

## 2022-02-10 DIAGNOSIS — F90.8 ATTENTION DEFICIT HYPERACTIVITY DISORDER (ADHD), OTHER TYPE: ICD-10-CM

## 2022-02-10 DIAGNOSIS — G47.00 INSOMNIA, UNSPECIFIED TYPE: ICD-10-CM

## 2022-02-10 PROCEDURE — 99214 OFFICE O/P EST MOD 30 MIN: CPT | Mod: GT | Performed by: NURSE PRACTITIONER

## 2022-02-10 RX ORDER — GABAPENTIN 400 MG/1
800 CAPSULE ORAL 3 TIMES DAILY
Qty: 180 CAPSULE | Refills: 1 | Status: SHIPPED | OUTPATIENT
Start: 2022-03-10 | End: 2022-04-29

## 2022-02-10 RX ORDER — VENLAFAXINE HYDROCHLORIDE 37.5 MG/1
75 CAPSULE, EXTENDED RELEASE ORAL DAILY
Qty: 60 CAPSULE | Refills: 1 | Status: SHIPPED | OUTPATIENT
Start: 2022-02-10 | End: 2022-03-18

## 2022-02-10 RX ORDER — TRAZODONE HYDROCHLORIDE 50 MG/1
TABLET, FILM COATED ORAL
Qty: 30 TABLET | Refills: 1 | Status: SHIPPED | OUTPATIENT
Start: 2022-02-10 | End: 2022-04-29

## 2022-02-10 RX ORDER — METHYLPHENIDATE HYDROCHLORIDE 18 MG/1
18 TABLET ORAL EVERY MORNING
Qty: 30 TABLET | Refills: 0 | Status: SHIPPED | OUTPATIENT
Start: 2022-02-11 | End: 2022-03-14

## 2022-02-10 NOTE — TELEPHONE ENCOUNTER
On February 10, 2022, at 12:34 PM, writer called patient at mobile to confirm Virtual Visit. Writer unable to make contact with patient. Writer unable to leave detailed voice mail message due to no option. JAME Estrada    On February 10, 2022, at 12:48 PM, writer called patient at mobile to confirm Virtual Visit. Writer unable to make contact with patient. A link to the video visit was sent to the patient's email address and mobile phone number. Rene Bowman, EMT

## 2022-02-10 NOTE — PATIENT INSTRUCTIONS
-Increase Effexor XR to 75 mg daily for mood.  Please continue to monitor blood pressure at least 2 times a week.  If this is over 150/90's, please message emily immediately.  -Continue all other medications for now.    Primary Care  Catherine Armendariz, [facility] [email or web address]."[facility].APROOFED/care/find/doctor/[email or web address]/  [phone].    Your next appointment is scheduled on 3/18/2022 (Fri) at 1pm.    Thank you for coming to the [facility] [place] [facility].    Lab Testing:  If you had lab testing today and your results are reassuring or normal they will be mailed to you or sent through [employer] within 7 days. If the lab tests need quick action we will call you with the results. The phone number we will call with results is # [phone] (home) . If this is not the best number please call our clinic and change the number.    Medication Refills:  If you need any refills please call your pharmacy and they will contact us. Our fax number for refills is [phone]. Please allow three business for refill processing. If you need to  your refill at a new pharmacy, please contact the new pharmacy directly. The new pharmacy will help you get your medications transferred.     Scheduling:  If you have any concerns about today's visit or wish to schedule another appointment please call our office during normal business hours [phone] (8-5:00 M-F)    Contact Us:  Please call [phone] during business hours (8-5:00 M-F).  If after clinic hours, or on the weekend, please call  [phone].    Financial Assistance [phone]  [facility] Billing [phone]  Central Billing Office, [facility]: [phone]  [facility] Billing [phone]  Medical Records [phone]      MENTAL HEALTH CRISIS NUMBERS:  For a medical emergency please call  911 or go to the nearest ER.     [facility]:   [facility] -[phone]   Crisis Residence [place]  [facility] -[phone]   Walk[facility] Center [facility] -[phone]   COPE 24/7 [facility] Mobile Team -[phone] (adults)/[phone] (child)  CHILD: [facility] needs assessment team - [phone]      [facility]:   [facility] - [phone]   Walk-in counseling [facility] - [phone]   Walk-in counseling [facility] - [phone]   Crisis Residence [facility]idence - [phone]  Urgent Care Adult Mental [facility]-[phone] mobile unit/ 24/7 crisis line    National Crisis Numbers:   National Suicide Prevention Lifeline: 2-944-607-TALK (599-964-9996)  Poison Control Center - 2-828-044-8224  [employer]/resources for a list of additional resources (SOS)  Trans Lifeline a hotline for transgender people 9-203-553-6596  The [employer] Project a hotline for LGBT youth 4-033-845-2281  Crisis Text Line: For any crisis 24/7   To: 894811  see www.crisistextline.org  - IF MAKING A CALL FEELS TOO HARD, send a text!         Again thank you for choosing [facility] and please let us know how we can best partner with you to improve you and your family's health.    You may be receiving a survey regarding this appointment. We would love to have your feedback, both positive and negative. The survey is done by an external company, so your answers are anonymous.

## 2022-02-10 NOTE — TELEPHONE ENCOUNTER
Concerta  1/13. Talked to the pharmacy and insurance will be able to cover if she picks up Concerta today.  Pharmacist will change  date to today.  Called and LVM to pt's phone that pharmacy will work on dispensing the medication today. Abbey Hogan CNP, 2/10/2022

## 2022-02-10 NOTE — TELEPHONE ENCOUNTER
[facility] Call Center    Phone Message    May a detailed message be left on voicemail: yes     Reason for Call: Medication Question or concern regarding medication   Prescription Clarification  Name of Medication: concerta  Prescribing Provider: Emily Hogan   Pharmacy:      [facility] [address] [place] [address].     What on the order needs clarification? Pt says rx was written so it can't be filled until tomorrow and pharmacy can't override. She says she took her last pill and needs to pick this up tonight, and that the pharmacy closes at 5          Action Taken: Message routed to:  Other: nursing pool, emily    Travel Screening: Not Applicable
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
## 2022-02-25 ENCOUNTER — DOCUMENTATION ONLY (OUTPATIENT)
Dept: BEHAVIORAL HEALTH | Facility: CLINIC | Age: 47
End: 2022-02-25
Payer: COMMERCIAL

## 2022-02-25 NOTE — PROGRESS NOTES
Therapist (writer) attempted to call pt at time of the appointment. Pt did not answer and therapist was unable to leave a VM due to the phone call ending without going to it. Therapist attempted to call pt 10 minutes into the appointment to connect with them again, both through standard [employer] and through back-up [email or web address] system. Therapist was unable to reach pt and wasn't able to leave a VM as the phone did not ring nor go to VM before the call ended, so the session was cancelled.

## 2022-03-14 DIAGNOSIS — F90.8 ATTENTION DEFICIT HYPERACTIVITY DISORDER (ADHD), OTHER TYPE: ICD-10-CM

## 2022-03-14 RX ORDER — METHYLPHENIDATE HYDROCHLORIDE 18 MG/1
18 TABLET ORAL EVERY MORNING
Qty: 30 TABLET | Refills: 0 | Status: SHIPPED | OUTPATIENT
Start: 2022-03-14 | End: 2022-03-18 | Stop reason: DRUGHIGH

## 2022-03-18 ENCOUNTER — VIRTUAL VISIT (OUTPATIENT)
Dept: PSYCHIATRY | Facility: CLINIC | Age: 47
End: 2022-03-18
Attending: NURSE PRACTITIONER
Payer: COMMERCIAL

## 2022-03-18 DIAGNOSIS — G47.00 INSOMNIA, UNSPECIFIED TYPE: ICD-10-CM

## 2022-03-18 DIAGNOSIS — F90.8 ATTENTION DEFICIT HYPERACTIVITY DISORDER (ADHD), OTHER TYPE: Primary | ICD-10-CM

## 2022-03-18 DIAGNOSIS — F33.1 MODERATE EPISODE OF RECURRENT MAJOR DEPRESSIVE DISORDER (H): ICD-10-CM

## 2022-03-18 DIAGNOSIS — F41.9 ANXIETY: ICD-10-CM

## 2022-03-18 PROCEDURE — 99214 OFFICE O/P EST MOD 30 MIN: CPT | Mod: GT | Performed by: NURSE PRACTITIONER

## 2022-03-18 RX ORDER — METHYLPHENIDATE HYDROCHLORIDE 27 MG/1
27 TABLET ORAL EVERY MORNING
Qty: 30 TABLET | Refills: 0 | Status: SHIPPED | OUTPATIENT
Start: 2022-03-18 | End: 2022-04-15

## 2022-03-18 RX ORDER — VENLAFAXINE HYDROCHLORIDE 37.5 MG/1
75 CAPSULE, EXTENDED RELEASE ORAL DAILY
Qty: 60 CAPSULE | Refills: 1 | Status: SHIPPED | OUTPATIENT
Start: 2022-03-18 | End: 2022-04-29

## 2022-03-18 ASSESSMENT — PATIENT HEALTH QUESTIONNAIRE - PHQ9
SUM OF ALL RESPONSES TO PHQ QUESTIONS 1-9: 17
10. IF YOU CHECKED OFF ANY PROBLEMS, HOW DIFFICULT HAVE THESE PROBLEMS MADE IT FOR YOU TO DO YOUR WORK, TAKE CARE OF THINGS AT HOME, OR GET ALONG WITH OTHER PEOPLE: VERY DIFFICULT
SUM OF ALL RESPONSES TO PHQ QUESTIONS 1-9: 17

## 2022-03-18 ASSESSMENT — ANXIETY QUESTIONNAIRES
6. BECOMING EASILY ANNOYED OR IRRITABLE: MORE THAN HALF THE DAYS
4. TROUBLE RELAXING: NEARLY EVERY DAY
3. WORRYING TOO MUCH ABOUT DIFFERENT THINGS: MORE THAN HALF THE DAYS
2. NOT BEING ABLE TO STOP OR CONTROL WORRYING: MORE THAN HALF THE DAYS
GAD7 TOTAL SCORE: 13
1. FEELING NERVOUS, ANXIOUS, OR ON EDGE: SEVERAL DAYS
GAD7 TOTAL SCORE: 13
7. FEELING AFRAID AS IF SOMETHING AWFUL MIGHT HAPPEN: NOT AT ALL
7. FEELING AFRAID AS IF SOMETHING AWFUL MIGHT HAPPEN: NOT AT ALL
GAD7 TOTAL SCORE: 13
5. BEING SO RESTLESS THAT IT IS HARD TO SIT STILL: NEARLY EVERY DAY

## 2022-03-18 NOTE — PATIENT INSTRUCTIONS
-Increase Concerta to 27 mg daily for ADHD.  Monitor your blood pressure at least 3 times/week.  If this is consistently in 140/90's, please contact emily and let's plan to reduce back to 18 mg daily.  -Continue all other medication regimen for now.    Your next appointment is scheduled on 4/29/2022 (Fri) at 8:30am.      **For crisis resources, please see the information at the end of this document**   Patient Education    Thank you for coming to the [facility] [place] [facility].    Lab Testing:  If you had lab testing today and your results are reassuring or normal they will be mailed to you or sent through [employer] within 7 days. If the lab tests need quick action we will call you with the results. The phone number we will call with results is # [phone]. If this is not the best number please call our clinic and change the number.     Medication Refills:  If you need any refills please call your pharmacy and they will contact us. Our fax number for refills is [phone]. Please allow three business days for refill processing.   If you need to change to a different pharmacy, please contact the new pharmacy directly. The new pharmacy will help you get your medications transferred.     Contact Us:  Please call [phone] during business hours (8-5:00 M-F).  If you have medication related questions after clinic hours, or on the weekend, please call [phone].    Financial Assistance [phone]  Medical Records [phone]       MENTAL HEALTH CRISIS RESOURCES:  For a emergency help, please call 911 or go to the nearest Emergency Department.     Emergency Walk-In Options:   [facility] Unit @ [facility] ([place]): [phone] - Specialized mental health emergency area designed to be calming  [facility] ([place]): [phone]  [facility] ([place]): [phone]  [facility] ([place]): [phone]    [facility]  Crisis Information:   [place]: [phone]  [place]: [phone]  [place] ([facility]) - Adult: [phone]     Child: [phone]  [place] - Adult: [phone]     Child: [phone]  [place]: [phone]  List of all [facility] resources:   https://[place].gov/dhs/people-we-serve/adults/health-care/mental-health/resources/crisis-contacts.jsp    National Crisis Information:   Crisis Text Line: Text  [place]  to 689784  National Suicide Prevention Lifeline: 2-412-168-TALK (1-146.191.6462)       For online chat options, visit https://suicidepreventionlifeline.org/chat/  Poison Control Center: 0-040-015-9943  Trans Lifeline: 1-218.933.5621 - Hotline for transgender people of all ages  The Minh Project: 7-496-432-5984 - Hotline for LGBT youth     For Non-Emergency Support:   Fast Tracker: Mental Health & Substance Use Disorder Resources -   https://www.Michael BiekerckiDoneThisn.org/

## 2022-03-18 NOTE — PROGRESS NOTES
Mason Crowe is a 46 year old who has consented to receive services via billable video visit.      Pt will join video visit via: [employer]  If there are problems joining the visit, send backup video invite via: Text to preferred phone: [phone]      Originating Location (patient location): Patient's home  Distant Location (provider location): [facility] [place] [facility]    Will anyone else be joining the video visit? No    How would you prefer to obtain AVS?: Keith
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
## 2022-03-19 ASSESSMENT — PATIENT HEALTH QUESTIONNAIRE - PHQ9: SUM OF ALL RESPONSES TO PHQ QUESTIONS 1-9: 17

## 2022-03-19 ASSESSMENT — ANXIETY QUESTIONNAIRES: GAD7 TOTAL SCORE: 13

## 2022-03-24 ENCOUNTER — VIRTUAL VISIT (OUTPATIENT)
Dept: BEHAVIORAL HEALTH | Facility: HOSPITAL | Age: 47
End: 2022-03-24
Payer: COMMERCIAL

## 2022-03-24 DIAGNOSIS — F90.0 ATTENTION DEFICIT HYPERACTIVITY DISORDER, INATTENTIVE TYPE, MODERATE: Primary | ICD-10-CM

## 2022-03-24 DIAGNOSIS — F33.1 MAJOR DEPRESSIVE DISORDER, RECURRENT EPISODE, MODERATE (H): ICD-10-CM

## 2022-03-24 PROCEDURE — 90837 PSYTX W PT 60 MINUTES: CPT | Mod: GT,95 | Performed by: COUNSELOR

## 2022-03-24 NOTE — PROGRESS NOTES
"-    [facility]                                     Progress Note    Patient Name: Mason Crowe  Date: 3/24/22         Service Type: Individual      Session Start Time: 10:05 am  Session End Time: 11:08 am     Session Length: 63 minutes    Session #: 8    Attendees: Client attended alone    Service Modality:  Phone Visit:      Provider verified identity through the following two step process.  Patient provided:  Patient is known previously to provider    The patient has been notified of the following:      \"We have found that certain health care needs can be provided without the need for a face to face visit.  This service lets us provide the care you need with a phone conversation.       I will have full access to your [facility] medical record during this entire phone call.   I will be taking notes for your medical record.      Since this is like an office visit, we will bill your insurance company for this service.       There are potential benefits and risks of telephone visits (e.g. limits to patient confidentiality) that differ from in-person visits.?Confidentiality still applies for telephone services, and nobody will record the visit.  It is important to be in a quiet, private space that is free of distractions (including cell phone or other devices) during the visit.??      If during the course of the call I believe a telephone visit is not appropriate, you will not be charged for this service\"     Consent has been obtained for this service by care team member: Yes     DATA  Interactive Complexity: Yes, visit entailed Interactive Complexity evidenced by:  -The need to manage maladaptive communication (related to, e.g., high anxiety, high reactivity, repeated questions, or disagreement) among participants that complicates delivery of care  Crisis: No        Progress Since Last Session (Related to Symptoms / Goals / Homework):   Symptoms: Worsening symptoms, pt endorses worsening " ADHD and it has been impacting her ability to focus and sit still significantly, especially at school.    Homework: Partially completed      Episode of Care Goals: Satisfactory progress - ACTION (Actively working towards change); Intervened by reinforcing change plan / affirming steps taken     Current / Ongoing Stressors and Concerns:   Pt is currently seeking therapy due to ongoing depression and ADHD related symptoms. Since last session, pt reported that she has been very stressed due to being unable to help support her mom and herself at this time. Pt expressed feeling scattered and unable to sit still, expressing that she has been struggling since last session with her ADHD. Pt processed through what she has been doing to try to manage her anxiety, including being more consistent and utilizing organizational skills discussed previously. Pt expressed feeling scatter and overwhelmed, stating that she is regressing and doesn't feel that anything is really working. A long discussion about the importance of meds in treating/managing ADHD was had and pt's verbalized resistance to wanting to take them long term or if they felt they weren't working. Pt was open to feedback, but was very vocal about what she saw as issues in her behavior and with taking medication long term. Throughout session pt was very high energy and had a lot to express, but was receptive to therapist (writer) when they provided feedback. Attention was okay and pt agreed that she is still doing somewhat better then when she first started therapy, but doesn't think she is anywhere she needs to be in order to function at school or to hold down a job which is very frustrating for her. Pt has been sleeping better then what she reported during last session and has been doing better with consistency in her schedule.     Self-care and forgiveness are an ongoing issue that pt doesn't feel she can do, and identifying feelings of anxiety may be worth  exploring once ADHD are under control    Ongoing stressors for future discussion:  Talk to mom, including pt's sister, about finding a cheaper senior living apartment  Talk with mom about the financial issues and/or separate your accounts completely.     Treatment Objective(s) Addressed in This Session:   use daily planner 50% of the time  identify current  stressors which contribute to feelings of anxiety  use cognitive strategies identified in therapy to challenge anxious thoughts  Feel less tired and more energy during the day   Identify negative self-talk and behaviors: challenge core beliefs, myths, and actions  Improve concentration, focus, and mindfulness in daily activities   Feel less fidgety, restless or slow in daily activities / interpersonal interactions  Identify 3 strategies to be more organized     Intervention:   CBT: Reviewed how the enviroment can impact our ability to manage MH symptoms and reviewed ADHD behavior chains.   Psychodynamic: Processed through internal-experiences related to past GEN and attitude towards medication    Motivational Interviewing    MI Intervention: Expressed Empathy/Understanding, Supported Autonomy, Collaboration, Evocation, Permission to raise concern or advise, Open-ended questions, Reflections: simple and complex, Rolled with resistance: Emphasized patient autonomy, Complex reflection, Reframed sustain talk in the direction of change and Evoked patient agenda and Change talk (evoked)     Change Talk Expressed by the Patient: Desire to change Need to change Taking steps    Provider Response to Change Talk: E - Evoked more info from patient about behavior change, A - Affirmed patient's thoughts, decisions, or attempts at behavior change, R - Reflected patient's change talk and S - Summarized patient's change talk statements    Assessments completed prior to visit:  PHQ2:   PHQ-2 ( 1999 [employer]) 11/21/2019 10/1/2019   Q1: Little interest or pleasure in doing things 1 1    Q2: Feeling down, depressed or hopeless 1 2   PHQ-2 Score 2 3   PHQ-2 Total Score (12-17 Years)- Positive if 3 or more points; Administer PHQ-A if positive 2 3     PHQ9:   PHQ-9 SCORE 3/26/2019 10/1/2019 11/21/2019 10/18/2021 11/29/2021 1/6/2022 3/18/2022   PHQ-9 Total Score MyChart - - - - 14 (Moderate depression) 21 (Severe depression) 17 (Moderately severe depression)   PHQ-9 Total Score 15 16 12 19 14 21 17     GAD2:   QUIN-2 1/6/2022 2/6/2022 2/6/2022 3/18/2022   Feeling nervous, anxious, or on edge 2 2 2 1   Not being able to stop or control worrying 3 3 3 3   QUIN-2 Total Score 5 5 5 4     GAD7:   QUIN-7 SCORE 10/18/2021 11/29/2021 1/6/2022 2/6/2022 2/6/2022 3/18/2022   Total Score - 9 (mild anxiety) 14 (moderate anxiety) - 16 (severe anxiety) 13 (moderate anxiety)   Total Score 15 9 14 16 16 13     PROMIS 10-Global Health (only subscores and total score):   PROMIS-10 Scores Only 1/6/2022   Global Mental Health Score 6   Global Physical Health Score 11   PROMIS TOTAL - SUBSCORES 17         ASSESSMENT: Current Emotional / Mental Status (status of significant symptoms):   Risk status (Self / Other harm or suicidal ideation)   Patient denies current fears or concerns for personal safety.   Patient denies current or recent suicidal ideation or behaviors.   Patient denies current or recent homicidal ideation or behaviors.   Patient denies current or recent self injurious behavior or ideation.   Patient denies other safety concerns.   Patient reports there has been no change in risk factors since their last session.     Patient reports there has been no change in protective factors since their last session.     Recommended that patient call 911 or go to the local ED should there be a change in any of these risk factors.     Appearance:   unable to assess    Eye Contact:   unable to assess    Psychomotor Behavior: unable to assess    Attitude:   Cooperative  Interested [place]   Orientation:   All   Speech    Rate /  Production: Normal/ Responsive Emotional Talkative    Volume:  Normal    Mood:    Anxious  Elevated  Normal Agitated   Affect:    Appropriate  Worrisome    Thought Content:  Clear  Rumination    Thought Form:  Coherent  Logical  Tangential    Insight:    Fair      Medication Review:   Changes to psychiatric medications, see updated Medication List in EPIC.      Medication Compliance:   Yes pt ran out of meds for a week and just got back on them since getting her refill this week.     Changes in Health Issues:   Yes: Sleep disturbance, Associated Psychological Distress     Chemical Use Review:   Substance Use: Chemical use reviewed, no active concerns identified      Tobacco Use: No change in amount of tobacco use since last session.  Patient declined discussion at this time    Diagnosis:  1. Attention deficit hyperactivity disorder, inattentive type, moderate    2. Major depressive disorder, recurrent episode, moderate (H)        Collateral Reports Completed:   Not Applicable    PLAN: (Patient Tasks / Therapist Tasks / Other)  Continue to organize and remain consistent in where you place thing  Keep a consistent sleep schedule, bedtime 10-11 and wake time 6 am  Take meds as prescribed   Try to reduce clutter in field of vision to improve focus  Try using timer to help with task focus/switching    Chavez Farmer [facility], Provider Oversight:  Dr. Manjinder Thompson                               ______________________________________________________________________    Treatment Plan    Patient's Name: Mason Crowe  YOB: 1975    Date of Creation: 11/8/21  Date Treatment Plan Last Reviewed/Revised: 3/24/22    DSM5 Diagnoses: Attention-Deficit/Hyperactivity Disorder  314.00 (F90.0) Predominantly inattentive presentation or 296.32 (F33.1) Major Depressive Disorder, Recurrent Episode, Moderate _ and With anxious distress  Psychosocial / Contextual Factors: pt's inability to complete tasks and focus to the point she  can't work or help her mother, which is likely contributing to her depressive symptoms. Pt is currently undergoing ADHD assessment that still needs to be finished, but pt has not been able to focus on task related to it to complete it.    WHODAS:   WHODAS 2.0 Total Score 10/12/2021   Total Score 45   Total Score MyChart 45   PROMIS (reviewed every 90 days):   PROMIS-10 Scores  17    Referral / Collaboration:  Referral to another professional/service is not indicated at this time..  Therapist will encourage pt to continue following up with her ADHD assessment/ and psychiatry/med provider to that she was working with prior to initial therapy visit    Anticipated number of session for this episode of care: 13  Anticipation frequency of session: Weekly  Anticipated Duration of each session: 38-52 minutes  Treatment plan will be reviewed in 90 days or when goals have been changed.     MeasurableTreatment Goal(s) related to diagnosis / functional impairment(s)  Goal 1: Patient will improve in task completion rate and organization.    I will know I've met my goal when I am able to get things done in a single day.      Objective #A Use daily planner each day to identify tasks to complete.    Patient will use daily planner 50% of the time  Increase interest, engagement, and pleasure in doing things  Improve concentration, focus, and mindfulness in daily activities .  Status: Continued - Date(s): 3/24/22     Intervention(s)  Therapist will assign homework between each session related to topic matter  teach strategies related to behavior activitation and habit forming skills/strategies.    Objective #B Listen to music or other simultaneous activity to improve concentration/focus  Patient will identify activites she can do simultaniously to help her stay on task  Increase interest, engagement, and pleasure in doing things  Identify negative self-talk and behaviors: challenge core beliefs, myths, and actions  Feel less  fidgety, restless or slow in daily activities / interpersonal interactions.  Status: Continued - Date(s): 3/24/22    Intervention(s)  Therapist will assign homework between session related to topic matter.  Assist in identifying and developing cocurrent behaviors that are conducive to staying on task.    Objective #C Complete follow-through on commitments   Patient will complete her ADHD assessment and paperwork related to ongoing sobriety/education/work related objectives.  Status: Continued - Date(s): 3/24/22     Intervention(s)  Therapist will assign homework between sessions related to topic matter  make referrals to psychiatry and ADHD   assist in identifying barriers to success.      Goal 2: Patient will improve quality of sleep and reduce daytime sleepiness.    I will know I've met my goal when I can stay awake during at work and get to bed at a consistent time.      Objective #A Use CPAP machine consistently throughout the week.    Status: Continued - Date(s): 3/24/22     Patient will Improve quantity and quality of night time sleep / decrease daytime naps  Feel less tired and more energy during the day .    Intervention(s)  Therapist will assign homework between session related to topic.  provide educational materials on sleep hygiene strategies and the impact of sleep on health.    Objective #B Develop a sleep routine/habits that better supports a work/life balance  Patient will identify and use habit/routine forming strategies to improve organization  Improve quantity and quality of night time sleep / decrease daytime naps  Feel less tired and more energy during the day   Improve concentration, focus, and mindfulness in daily activities .    Status: Continued - Date(s): 3/24/22     Intervention(s)  Therapist will assign homework between session related to topic matter.  teach the client how to perform a behavioral chain analysis. Developing strategies to promote better sleep routine..      Goal 3:  Patiient will have fewer days of feeling hopeless or worthless    I will know I've met my goal when I am able to not get stuck in feelings of depression.      Objective #A Better manage feelings of guilt and sadness related to past   Status: Continued - Date(s): 3/24/22     Patient will Decrease frequency and intensity of feeling down, depressed, hopeless  Identify negative self-talk and behaviors: challenge core beliefs, myths, and actions.    Intervention(s)  Therapist will assign homework between session related to topic matter  teach emotional recognition/identification. recongizing triggers and ways to be proactive in coping with them..    Patient has reviewed and agreed to the above plan.      Chavez Farmer  November 8, 2021

## 2022-03-31 ENCOUNTER — VIRTUAL VISIT (OUTPATIENT)
Dept: BEHAVIORAL HEALTH | Facility: HOSPITAL | Age: 47
End: 2022-03-31
Payer: COMMERCIAL

## 2022-03-31 DIAGNOSIS — F33.1 MAJOR DEPRESSIVE DISORDER, RECURRENT EPISODE, MODERATE (H): ICD-10-CM

## 2022-03-31 DIAGNOSIS — F90.0 ATTENTION DEFICIT HYPERACTIVITY DISORDER, INATTENTIVE TYPE, MODERATE: Primary | ICD-10-CM

## 2022-03-31 PROCEDURE — 90837 PSYTX W PT 60 MINUTES: CPT | Mod: TEL,95 | Performed by: COUNSELOR

## 2022-03-31 NOTE — PROGRESS NOTES
"-    [facility]                                     Progress Note    Patient Name: Mason Crowe  Date: 3/31/22         Service Type: Individual      Session Start Time: 10:00 am  Session End Time: 11:02 am     Session Length: 62 minutes    Session #: 9    Attendees: Client attended alone    Service Modality:  Phone Visit:      Provider verified identity through the following two step process.  Patient provided:  Patient is known previously to provider    The patient has been notified of the following:      \"We have found that certain health care needs can be provided without the need for a face to face visit.  This service lets us provide the care you need with a phone conversation.       I will have full access to your [facility] medical record during this entire phone call.   I will be taking notes for your medical record.      Since this is like an office visit, we will bill your insurance company for this service.       There are potential benefits and risks of telephone visits (e.g. limits to patient confidentiality) that differ from in-person visits.?Confidentiality still applies for telephone services, and nobody will record the visit.  It is important to be in a quiet, private space that is free of distractions (including cell phone or other devices) during the visit.??      If during the course of the call I believe a telephone visit is not appropriate, you will not be charged for this service\"     Consent has been obtained for this service by care team member: Yes     DATA  Interactive Complexity: Yes, visit entailed Interactive Complexity evidenced by:  -The need to manage maladaptive communication (related to, e.g., high anxiety, high reactivity, repeated questions, or disagreement) among participants that complicates delivery of care  Crisis: No        Progress Since Last Session (Related to Symptoms / Goals / Homework):   Symptoms: Improving symptom presentation, but pt " does not endorse any worsening or improving of symptoms since last session    Homework: Partially completed      Episode of Care Goals: Satisfactory progress - PREPARATION (Decided to change - considering how); Intervened by negotiating a change plan and determining options / strategies for behavior change, identifying triggers, exploring social supports, and working towards setting a date to begin behavior change     Current / Ongoing Stressors and Concerns:   Pt is currently seeking therapy due to ongoing depression and ADHD related symptoms. Since last session, pt reported that this week was the anniversary of her father's passing and the 28th and 29th are always hard for her. She expressed that she has been sleeping more, but has been staying up late at night due to taking naps. Pt processed through how a test she took went and reflected on how she felt about it. Pt identified the areas she is going to study more and discussed strategies to studying to accommodate her ADHD. Pt reviewed changes she has been making to her environment and behaviors to help her manage her ADHD. Pt processed through an anger episode she had earlier this week with her family (mom and sister). Pt clarified that this was brought on by frustration related to feeing taken advantage of, describing how the transition of her mom to the [facility] and the lack of support from her sister, resulting in the loss of her job and is being taken for grated now. Pt acknowledged that she probably over-reacted to a degree and that she had responsibility in the situation, but recognized that what she said was stuff she needed to say or make her mom aware of that had been bottled up. Pt was calmer today and less scattered then last session, and confirmed that she started her high ADHD dose, but hasn't noticed any impact yet. During session pt was highly reactive and very talkative when discussing her family and required therapist (writer) to roll with  resistance to promote constructive conversation about the topic, which resulted in session taking longer then anticipated.    Self-care and forgiveness are an ongoing issue that pt doesn't feel she can do, and identifying feelings of anxiety may be worth exploring once ADHD are under control    Ongoing stressors for future discussion:  Talk to mom, including pt's sister, about finding a cheaper senior living apartment  Talk with mom about the financial issues and/or separate your accounts completely.     Treatment Objective(s) Addressed in This Session:   use daily planner 50% of the time  identify current  stressors which contribute to feelings of anxiety  use cognitive strategies identified in therapy to challenge anxious thoughts  Feel less tired and more energy during the day   Identify negative self-talk and behaviors: challenge core beliefs, myths, and actions  Improve concentration, focus, and mindfulness in daily activities   Feel less fidgety, restless or slow in daily activities / interpersonal interactions  Identify 3 strategies to be more organized     Intervention:   CBT: Discussed changes made to address ADHD symptoms and other strategies that may be helpful for studying   Psychodynamic: Processed through internal-experiences related to family difficulties and conflict that caused an anger outbursts this week.    Motivational Interviewing    MI Intervention: Expressed Empathy/Understanding, Supported Autonomy, Collaboration, Evocation, Permission to raise concern or advise, Open-ended questions, Reflections: simple and complex, Rolled with resistance: Emphasized patient autonomy, Complex reflection, Reframed sustain talk in the direction of change and Evoked patient agenda and Change talk (evoked)     Change Talk Expressed by the Patient: Desire to change Need to change Taking steps    Provider Response to Change Talk: E - Evoked more info from patient about behavior change, A - Affirmed patient's  thoughts, decisions, or attempts at behavior change, R - Reflected patient's change talk and S - Summarized patient's change talk statements    Assessments completed prior to visit:  PHQ2:   PHQ-2 ( 1999 [employer]) 11/21/2019 10/1/2019   Q1: Little interest or pleasure in doing things 1 1   Q2: Feeling down, depressed or hopeless 1 2   PHQ-2 Score 2 3   PHQ-2 Total Score (12-17 Years)- Positive if 3 or more points; Administer PHQ-A if positive 2 3     PHQ9:   PHQ-9 SCORE 3/26/2019 10/1/2019 11/21/2019 10/18/2021 11/29/2021 1/6/2022 3/18/2022   PHQ-9 Total Score MyChart - - - - 14 (Moderate depression) 21 (Severe depression) 17 (Moderately severe depression)   PHQ-9 Total Score 15 16 12 19 14 21 17     GAD2:   QUIN-2 1/6/2022 2/6/2022 2/6/2022 3/18/2022   Feeling nervous, anxious, or on edge 2 2 2 1   Not being able to stop or control worrying 3 3 3 3   QUIN-2 Total Score 5 5 5 4     GAD7:   QUIN-7 SCORE 10/18/2021 11/29/2021 1/6/2022 2/6/2022 2/6/2022 3/18/2022   Total Score - 9 (mild anxiety) 14 (moderate anxiety) - 16 (severe anxiety) 13 (moderate anxiety)   Total Score 15 9 14 16 16 13     PROMIS 10-Global Health (only subscores and total score):   PROMIS-10 Scores Only 1/6/2022   Global Mental Health Score 6   Global Physical Health Score 11   PROMIS TOTAL - SUBSCORES 17         ASSESSMENT: Current Emotional / Mental Status (status of significant symptoms):   Risk status (Self / Other harm or suicidal ideation)   Patient denies current fears or concerns for personal safety.   Patient denies current or recent suicidal ideation or behaviors.   Patient denies current or recent homicidal ideation or behaviors.   Patient denies current or recent self injurious behavior or ideation.   Patient denies other safety concerns.   Patient reports there has been no change in risk factors since their last session.     Patient reports there has been no change in protective factors since their last session.     Recommended that  patient call 911 or go to the local ED should there be a change in any of these risk factors.     Appearance:   unable to assess    Eye Contact:   unable to assess    Psychomotor Behavior: unable to assess    Attitude:   Cooperative  Interested Friendly Pleasant   Orientation:   All   Speech    Rate / Production: Normal/ Responsive Talkative    Volume:  Normal    Mood:    Anxious  Normal   Affect:    Appropriate    Thought Content:  Clear  Referential Thinking    Thought Form:  Coherent  Logical    Insight:    Good      Medication Review:   Changes to psychiatric medications, see updated Medication List in EPIC.      Medication Compliance:   Yes     Changes in Health Issues:   Yes: Sleep disturbance, Associated Psychological Distress     Chemical Use Review:   Substance Use: Chemical use reviewed, no active concerns identified      Tobacco Use: No change in amount of tobacco use since last session.  Patient declined discussion at this time    Diagnosis:  1. Attention deficit hyperactivity disorder, inattentive type, moderate    2. Major depressive disorder, recurrent episode, moderate (H)        Collateral Reports Completed:   Not Applicable    PLAN: (Patient Tasks / Therapist Tasks / Other)  Work on self-care and recognizing that you can't do/carry everything yourself  Keep a consistent sleep schedule, bedtime 10-11 and wake time 6 am  Take meds as prescribed   Try using timer to help with task focus/switching  Read out loud to study for exam/test    Chavez Farmer [facility], Provider Oversight:  Dr. Manjinder Thompson                               ______________________________________________________________________    Treatment Plan    Patient's Name: Mason Crowe  YOB: 1975    Date of Creation: 11/8/21  Date Treatment Plan Last Reviewed/Revised: 3/24/22    DSM5 Diagnoses: Attention-Deficit/Hyperactivity Disorder  314.00 (F90.0) Predominantly inattentive presentation or 296.32 (F33.1) Major Depressive  Disorder, Recurrent Episode, Moderate _ and With anxious distress  Psychosocial / Contextual Factors: pt's inability to complete tasks and focus to the point she can't work or help her mother, which is likely contributing to her depressive symptoms. Pt is currently undergoing ADHD assessment that still needs to be finished, but pt has not been able to focus on task related to it to complete it.    WHODAS:   WHODAS 2.0 Total Score 10/12/2021   Total Score 45   Total Score MyChart 45   PROMIS (reviewed every 90 days):   PROMIS-10 Scores  17    Referral / Collaboration:  Referral to another professional/service is not indicated at this time..  Therapist will encourage pt to continue following up with her ADHD assessment/ and psychiatry/med provider to that she was working with prior to initial therapy visit    Anticipated number of session for this episode of care: 13  Anticipation frequency of session: Weekly  Anticipated Duration of each session: 38-52 minutes  Treatment plan will be reviewed in 90 days or when goals have been changed.     MeasurableTreatment Goal(s) related to diagnosis / functional impairment(s)  Goal 1: Patient will improve in task completion rate and organization.    I will know I've met my goal when I am able to get things done in a single day.      Objective #A Use daily planner each day to identify tasks to complete.    Patient will use daily planner 50% of the time  Increase interest, engagement, and pleasure in doing things  Improve concentration, focus, and mindfulness in daily activities .  Status: Continued - Date(s): 3/24/22     Intervention(s)  Therapist will assign homework between each session related to topic matter  teach strategies related to behavior activitation and habit forming skills/strategies.    Objective #B Listen to music or other simultaneous activity to improve concentration/focus  Patient will identify activites she can do simultaniously to help her stay on  task  Increase interest, engagement, and pleasure in doing things  Identify negative self-talk and behaviors: challenge core beliefs, myths, and actions  Feel less fidgety, restless or slow in daily activities / interpersonal interactions.  Status: Continued - Date(s): 3/24/22     Intervention(s)  Therapist will assign homework between session related to topic matter.  Assist in identifying and developing cocurrent behaviors that are conducive to staying on task.    Objective #C Complete follow-through on commitments   Patient will complete her ADHD assessment and paperwork related to ongoing sobriety/education/work related objectives.  Status: Continued - Date(s): 3/24/22     Intervention(s)  Therapist will assign homework between sessions related to topic matter  make referrals to psychiatry and ADHD   assist in identifying barriers to success.      Goal 2: Patient will improve quality of sleep and reduce daytime sleepiness.    I will know I've met my goal when I can stay awake during at work and get to bed at a consistent time.      Objective #A Use CPAP machine consistently throughout the week.    Status: Continued - Date(s): 3/24/22     Patient will Improve quantity and quality of night time sleep / decrease daytime naps  Feel less tired and more energy during the day .    Intervention(s)  Therapist will assign homework between session related to topic.  provide educational materials on sleep hygiene strategies and the impact of sleep on health.    Objective #B Develop a sleep routine/habits that better supports a work/life balance  Patient will identify and use habit/routine forming strategies to improve organization  Improve quantity and quality of night time sleep / decrease daytime naps  Feel less tired and more energy during the day   Improve concentration, focus, and mindfulness in daily activities .    Status: Continued - Date(s): 3/24/22     Intervention(s)  Therapist will assign homework  between session related to topic matter.  teach the client how to perform a behavioral chain analysis. Developing strategies to promote better sleep routine..      Goal 3: Patiient will have fewer days of feeling hopeless or worthless    I will know I've met my goal when I am able to not get stuck in feelings of depression.      Objective #A Better manage feelings of guilt and sadness related to past   Status: Continued - Date(s): 3/24/22     Patient will Decrease frequency and intensity of feeling down, depressed, hopeless  Identify negative self-talk and behaviors: challenge core beliefs, myths, and actions.    Intervention(s)  Therapist will assign homework between session related to topic matter  teach emotional recognition/identification. recongizing triggers and ways to be proactive in coping with them..    Patient has reviewed and agreed to the above plan.      Chavez Farmer  November 8, 2021

## 2022-04-07 ENCOUNTER — VIRTUAL VISIT (OUTPATIENT)
Dept: BEHAVIORAL HEALTH | Facility: HOSPITAL | Age: 47
End: 2022-04-07
Payer: COMMERCIAL

## 2022-04-07 DIAGNOSIS — F33.1 MAJOR DEPRESSIVE DISORDER, RECURRENT EPISODE, MODERATE (H): ICD-10-CM

## 2022-04-07 DIAGNOSIS — F90.0 ATTENTION DEFICIT HYPERACTIVITY DISORDER, INATTENTIVE TYPE, MODERATE: Primary | ICD-10-CM

## 2022-04-07 PROCEDURE — 90837 PSYTX W PT 60 MINUTES: CPT | Mod: TEL,95 | Performed by: COUNSELOR

## 2022-04-07 NOTE — PROGRESS NOTES
"-    [facility] Counseling                                     Progress Note    Patient Name: Mason Crowe  Date: 4/07/22         Service Type: Individual      Session Start Time: 10:10 am  Session End Time: 11:08 am   Appointment started late due to pt not answering on first attempt.  Session Length: 58 minutes    Session #: 10    Attendees: Client attended alone    Service Modality:  Phone Visit:      Provider verified identity through the following two step process.  Patient provided:  Patient is known previously to provider    The patient has been notified of the following:      \"We have found that certain health care needs can be provided without the need for a face to face visit.  This service lets us provide the care you need with a phone conversation.       I will have full access to your [facility] medical record during this entire phone call.   I will be taking notes for your medical record.      Since this is like an office visit, we will bill your insurance company for this service.       There are potential benefits and risks of telephone visits (e.g. limits to patient confidentiality) that differ from in-person visits.?Confidentiality still applies for telephone services, and nobody will record the visit.  It is important to be in a quiet, private space that is free of distractions (including cell phone or other devices) during the visit.??      If during the course of the call I believe a telephone visit is not appropriate, you will not be charged for this service\"     Consent has been obtained for this service by care team member: Yes     DATA  Interactive Complexity: Yes, visit entailed Interactive Complexity evidenced by:  -The need to manage maladaptive communication (related to, e.g., high anxiety, high reactivity, repeated questions, or disagreement) among participants that complicates delivery of care  Crisis: No        Progress Since Last Session (Related to Symptoms / Goals " / Homework):   Symptoms: Improving symptoms, pt reported feeling much better this week as she was able to reconect with her son and felt more productive    Homework: Partially completed      Episode of Care Goals: Satisfactory progress - PREPARATION (Decided to change - considering how); Intervened by negotiating a change plan and determining options / strategies for behavior change, identifying triggers, exploring social supports, and working towards setting a date to begin behavior change     Current / Ongoing Stressors and Concerns:   Pt is currently seeking therapy due to ongoing depression and ADHD related symptoms. Since last session, pt reported that this past week her son turned 30 and they have been talking more since then. Session took longer then anticipated due to maladaptive/complex communication between participants, primarily due to ADHD/Anxiety symptoms, despite redirection was attempted to end session within the anticipated amount of time. Pt expressed that this brightened her mood this week and has been focused on catching up with him. Pt processed through her conversation(s) with her son and that she has gotten some clarity on conflict between him and her mom, which pt said was very helpful. Pt reflected on how they both would like to move back to [place] someday, but realistically that isn't an option right now. Pt talked about her organizing her apartment and working on accommodating her ADHD, but also processed through recent difficulties with staying focused on school. Pt identified being bored as a big trigger again for her and that multi-tasking has been something she can no longer due. Pt processed through her decision/thoughts to apply for disability and how she doesn't like it but feels that she can't translate the progress she has made at home to a work environment currently. Pt also processed through how she has been handling her depressive symptoms and lower sense of  self-worth/esteeem. Pt ruminated on how she doesn't know what happened and frustrated that she doesn't know why she can't do things like she used to, nor remember/retain information like she did a couple years ago. Pt also talked about needing to get set up with a new doctor because her previous PCP quite and pt was never reassigned to a new provider. Therapist (writer) and pt reviewed her treatment plan as well today and updated it as needed. Updated PROMIS 10 was not completed due to lack of time, this will be completed next session and amended to trx plan then.     Self-care and forgiveness are an ongoing issue that pt doesn't feel she can do, and identifying feelings of anxiety may be worth exploring once ADHD is more under control.     Treatment Objective(s) Addressed in This Session:   identify and use 3 strategies to improve organization  identify unhepful thinking, triggers, and stressors which contribute to feelings of depression  identify patterns of escalation  (i.e. tightness in chest, flushed face, increased heart rate, clenched hands, etc.)  use cognitive strategies identified in therapy to challenge anxious thoughts  Improve quantity and quality of night time sleep / decrease daytime naps  Feel less tired and more energy during the day   Identify negative self-talk and behaviors: challenge core beliefs, myths, and actions  Improve concentration, focus, and mindfulness in daily activities   Feel less fidgety, restless or slow in daily activities / interpersonal interactions     Intervention:   CBT: Discussed changes made to address ADHD symptoms and other strategies that may be helpful for studying   Psychodynamic: Processed through internal-experiences related to family difficulties and patterns of behavior that contribute to stress levels.    Motivational Interviewing    MI Intervention: Expressed Empathy/Understanding, Supported Autonomy, Collaboration, Evocation, Open-ended questions, Reflections:  simple and complex and Rolled with resistance: Emphasized patient autonomy, Simple reflection, Reframed sustain talk in the direction of change and Evoked patient agenda     Change Talk Expressed by the Patient: Desire to change Need to change Taking steps    Provider Response to Change Talk: E - Evoked more info from patient about behavior change, A - Affirmed patient's thoughts, decisions, or attempts at behavior change, R - Reflected patient's change talk and S - Summarized patient's change talk statements    Assessments completed prior to visit:  PHQ2:   PHQ-2 ( 1999 [employer]) 11/21/2019 10/1/2019   Q1: Little interest or pleasure in doing things 1 1   Q2: Feeling down, depressed or hopeless 1 2   PHQ-2 Score 2 3   PHQ-2 Total Score (12-17 Years)- Positive if 3 or more points; Administer PHQ-A if positive 2 3     PHQ9:   PHQ-9 SCORE 3/26/2019 10/1/2019 11/21/2019 10/18/2021 11/29/2021 1/6/2022 3/18/2022   PHQ-9 Total Score MyChart - - - - 14 (Moderate depression) 21 (Severe depression) 17 (Moderately severe depression)   PHQ-9 Total Score 15 16 12 19 14 21 17     GAD2:   QUIN-2 1/6/2022 2/6/2022 2/6/2022 3/18/2022   Feeling nervous, anxious, or on edge 2 2 2 1   Not being able to stop or control worrying 3 3 3 3   QUIN-2 Total Score 5 5 5 4     GAD7:   QUIN-7 SCORE 10/18/2021 11/29/2021 1/6/2022 2/6/2022 2/6/2022 3/18/2022   Total Score - 9 (mild anxiety) 14 (moderate anxiety) - 16 (severe anxiety) 13 (moderate anxiety)   Total Score 15 9 14 16 16 13     PROMIS 10-Global Health (only subscores and total score):   PROMIS-10 Scores Only 1/6/2022   Global Mental Health Score 6   Global Physical Health Score 11   PROMIS TOTAL - SUBSCORES 17         ASSESSMENT: Current Emotional / Mental Status (status of significant symptoms):   Risk status (Self / Other harm or suicidal ideation)   Patient denies current fears or concerns for personal safety.   Patient denies current or recent suicidal ideation or behaviors.   Patient  denies current or recent homicidal ideation or behaviors.   Patient denies current or recent self injurious behavior or ideation.   Patient denies other safety concerns.   Patient reports there has been no change in risk factors since their last session.     Patient reports there has been no change in protective factors since their last session.     Recommended that patient call 911 or go to the local ED should there be a change in any of these risk factors.     Appearance:   unable to assess    Eye Contact:   unable to assess    Psychomotor Behavior: unable to assess    Attitude:   Cooperative  Interested [place]   Orientation:   All   Speech    Rate / Production: Normal/ Responsive Hyperverbal  Talkative    Volume:  Normal    Mood:    Anxious  Depressed  Normal   Affect:    Appropriate    Thought Content:  Clear  Rumination    Thought Form:  Coherent  Logical  Tangential    Insight:    Fair      Medication Review:   No changes to current psychiatric medication(s)     Medication Compliance:   Yes     Changes in Health Issues:   None reported     Chemical Use Review:   Substance Use: Chemical use reviewed, no active concerns identified      Tobacco Use: No change in amount of tobacco use since last session.  Patient declined discussion at this time    Diagnosis:  1. Attention deficit hyperactivity disorder, inattentive type, moderate    2. Major depressive disorder, recurrent episode, moderate (H)        Collateral Reports Completed:   Not Applicable    PLAN: (Patient Tasks / Therapist Tasks / Other)  Keep a consistent sleep schedule, bedtime 10-11 and wake time 6 am - don't take naps  Take meds as prescribed  Try using timer to help with task focus/switching*  Read out loud to study for exam/test - respond to counselor   Go on MyChart and set up a doctors appointment  Write down two things you want to focus on in therapy next week.    [facility] Farmer [facility], Provider Oversight:  Dr. Dunbar  Donna    ______________________________________________________________________    Individual Treatment Plan    Patient's Name: Mason Crowe  YOB: 1975    Date of Creation: 11/8/21  Date Treatment Plan Last Reviewed/Revised: 4/7/22    DSM5 Diagnoses: Attention-Deficit/Hyperactivity Disorder  314.01 (F90.2) Combined presentation or 296.32 (F33.1) Major Depressive Disorder, Recurrent Episode, Moderate With anxious distress  Psychosocial / Contextual Factors: pt's inability to complete tasks and focus to the point she can't work or help her mother, which is likely contributing to her depressive symptoms. Pt is currently undergoing ADHD assessment that still needs to be finished, but pt has not been able to focus on task related to it to complete it.    WHODAS:   WHODAS 2.0 Total Score 10/12/2021   Total Score 45   Total Score MyChart 45   PROMIS (reviewed every 90 days):   PROMIS 10-Global Health (only subscores and total score):   PROMIS-10 Scores Only 1/6/2022   Global Mental Health Score 6   Global Physical Health Score 11   PROMIS TOTAL - SUBSCORES 17       Referral / Collaboration:  Referral to another professional/service is not indicated at this time. This will be reviewed/reconsidered on an ongoing basis over the next 90 days to determine if a referral is appropriate for additional services and will be discussed with pt at that time if one is deemed necessary prior to next treatment plan update/review.    Anticipated number of session for this episode of care: 13  Anticipation frequency of session: Weekly  Anticipated Duration of each session: 38-52 minutes (Pt sessions tend to go over time due to complex/maladaptive communication related to MH symptoms, ongoing attempts to redirect to ending by the anticipated time will continue)  Treatment plan will be reviewed in 90 days or when goals have been changed.     MeasurableTreatment Goal(s) related to diagnosis / functional impairment(s)  Goal  1: Patient will improve in task completion rate and organization.    I will know I've met my goal when I am able to get things done in a single day.      Objective #A Use daily planner each day to identify tasks to complete.    Patient will Use [employer] to stay organized and plan things out (at least 50%)  Increase interest, engagement, and pleasure in doing things  Improve concentration, focus, and mindfulness in daily activities .  Status: Continued - Date(s): 4/7/22     Intervention(s)  Therapist will assign homework between each session related to topic matter  teach strategies related to behavior activitation and habit forming skills/strategies.    Objective #B Listen to music or other simultaneous activity to improve concentration/focus  Patient will identify activites she can do simultaniously to help her stay on task  Increase interest, engagement, and pleasure in doing things  Identify negative self-talk and behaviors: challenge core beliefs, myths, and actions  Feel less fidgety, restless or slow in daily activities / interpersonal interactions.  Status: Continued - Date(s): 4/7/22     Intervention(s)  Therapist will assign homework between session related to topic matter.  Assist in identifying and developing cocurrent behaviors that are conducive to staying on task.    Objective #C Complete follow-through on commitments   Patient will complete her ADHD assessment and paperwork related to ongoing sobriety/education/work related objectives.  Status: Completed - Date: 4/7/22     Intervention(s)  Therapist will assign homework between sessions related to topic matter  make referrals to psychiatry and ADHD   assist in identifying barriers to success.      Goal 2: Patient will improve quality of sleep and reduce daytime sleepiness.    I will know I've met my goal when I can stay awake during at work and get to bed at a consistent time.      Objective #A Use CPAP machine consistently throughout the  week.    Status: Continued - Date(s): 4/7/22     Patient will Improve quantity and quality of night time sleep / decrease daytime naps  Feel less tired and more energy during the day .    Intervention(s)  Therapist will assign homework between session related to topic.  provide educational materials on sleep hygiene strategies and the impact of sleep on health.    Objective #B Develop a sleep routine/habits that better supports a work/life balance  Patient will identify and use habit/routine forming strategies to improve organization  Improve quantity and quality of night time sleep / decrease daytime naps  Feel less tired and more energy during the day   Improve concentration, focus, and mindfulness in daily activities .    Status: Continued - Date(s): 4/7/22     Intervention(s)  Therapist will assign homework between session related to topic matter.  teach the client how to perform a behavioral chain analysis. Developing strategies to promote better sleep routine..      Goal 3: Patient will have fewer days of feeling hopeless or worthless    I will know I've met my goal when I am able to not get stuck in feelings of depression.      Objective #A Better manage feelings of guilt and sadness related to past, and improve self-esteem/worth  Status: Continued - Date(s): 4/7/22     Patient will identify unhelpful thinking, triggers, and stressors which contribute to feelings of depression  identify patterns of escalation  (i.e. tightness in chest, flushed face, increased heart rate, clenched hands, etc.)  Increase interest, engagement, and pleasure in doing things  Decrease frequency and intensity of feeling down, depressed, hopeless  Improve quantity and quality of night time sleep / decrease daytime naps  Identify negative self-talk and behaviors: challenge core beliefs, myths, and actions  Improve concentration, focus, and mindfulness in daily activities   identify coping strategies for improving  mood.    Intervention(s)  Therapist will assign homework between session related to topic matter  teach emotional recognition/identification. recongizing triggers and ways to be proactive in coping with them.    Patient has reviewed and agreed to the above plan.      [facility], Provider Oversight:  Dr. Manjinder Thompson  November 8, 2021

## 2022-04-14 ENCOUNTER — DOCUMENTATION ONLY (OUTPATIENT)
Dept: BEHAVIORAL HEALTH | Facility: CLINIC | Age: 47
End: 2022-04-14
Payer: COMMERCIAL

## 2022-04-14 NOTE — PROGRESS NOTES
Therapist (writer) called pt per appointment note instructions to [phone] at the scheduled appointment time. Pt did not answer at that time, with it going directly to , and therapist left message that they would call again in 10 minutes. Therapist called pt again in an attempt to reach them, but it went directly to  again. Therapist left  informing pt that they had attempted to reach them again and encouraged them to attend their next appointment, providing the date and time.  also included the office number and a reminder to use [employer] for pt to reach out if she was able to respond in another 10 minutes, so they could try to connect again today or if pt was having an issue. Therapist waited an addition 10-15 minutes for pt to reach out and when they did not the session was cancelled.

## 2022-04-20 ASSESSMENT — PATIENT HEALTH QUESTIONNAIRE - PHQ9
SUM OF ALL RESPONSES TO PHQ QUESTIONS 1-9: 12
SUM OF ALL RESPONSES TO PHQ QUESTIONS 1-9: 12
10. IF YOU CHECKED OFF ANY PROBLEMS, HOW DIFFICULT HAVE THESE PROBLEMS MADE IT FOR YOU TO DO YOUR WORK, TAKE CARE OF THINGS AT HOME, OR GET ALONG WITH OTHER PEOPLE: VERY DIFFICULT

## 2022-04-21 ENCOUNTER — VIRTUAL VISIT (OUTPATIENT)
Dept: BEHAVIORAL HEALTH | Facility: HOSPITAL | Age: 47
End: 2022-04-21
Attending: PSYCHIATRY & NEUROLOGY
Payer: COMMERCIAL

## 2022-04-21 DIAGNOSIS — F90.0 ATTENTION DEFICIT HYPERACTIVITY DISORDER, INATTENTIVE TYPE, MODERATE: Primary | ICD-10-CM

## 2022-04-21 DIAGNOSIS — F33.1 MAJOR DEPRESSIVE DISORDER, RECURRENT EPISODE, MODERATE (H): ICD-10-CM

## 2022-04-21 PROCEDURE — 90834 PSYTX W PT 45 MINUTES: CPT | Mod: TEL | Performed by: COUNSELOR

## 2022-04-21 ASSESSMENT — PATIENT HEALTH QUESTIONNAIRE - PHQ9: SUM OF ALL RESPONSES TO PHQ QUESTIONS 1-9: 12

## 2022-04-21 NOTE — PROGRESS NOTES
"-    [facility]                                     Progress Note    Patient Name: Mason Crowe  Date: 4/21/22         Service Type: Individual      Session Start Time: 9:10 am  Session End Time: 9:58 am     Session Length: 48 minutes    Session #: 11    Attendees: Client attended alone    Service Modality:  Phone Visit:      Provider verified identity through the following two step process.  Patient provided:  Patient is known previously to provider    The patient has been notified of the following:      \"We have found that certain health care needs can be provided without the need for a face to face visit.  This service lets us provide the care you need with a phone conversation.       I will have full access to your [facility] medical record during this entire phone call.   I will be taking notes for your medical record.      Since this is like an office visit, we will bill your insurance company for this service.       There are potential benefits and risks of telephone visits (e.g. limits to patient confidentiality) that differ from in-person visits.?Confidentiality still applies for telephone services, and nobody will record the visit.  It is important to be in a quiet, private space that is free of distractions (including cell phone or other devices) during the visit.??      If during the course of the call I believe a telephone visit is not appropriate, you will not be charged for this service\"     Consent has been obtained for this service by care team member: Yes     DATA  Interactive Complexity: Yes, visit entailed Interactive Complexity evidenced by:  -The need to manage maladaptive communication (related to, e.g., high anxiety, high reactivity, repeated questions, or disagreement) among participants that complicates delivery of care  Crisis: No        Progress Since Last Session (Related to Symptoms / Goals / Homework):   Symptoms: Worsening symptoms, pt endorsed increase in " depressive mood/behaviors    Homework: Partially completed      Episode of Care Goals: Minimal progress - PREPARATION (Decided to change - considering how); Intervened by negotiating a change plan and determining options / strategies for behavior change, identifying triggers, exploring social supports, and working towards setting a date to begin behavior change     Current / Ongoing Stressors and Concerns:   Pt is currently seeking therapy due to ongoing depression and ADHD related symptoms. Since last session, pt reported that her sleep pattern has improved and she is getting more regular sleep. Pt expressed being frustrated with not feeling like she is being productive despite her improved sleep and admitted to not getting any of her school and financial stuff done since out last session. Pt was dismissive when asked what she was doing instead, or couldn't readily recall. Pt reports that she has her schedule written down but feels like she doesn't follow it. Pt was able to identify wanting to talk about coping and difficulty with guilt. Pt processed through a conversation she had with her son last weekend, where she learned some hard things about what he went through when he was growing up. Pt became tearful when talking about it and expressed feeling like a bigger failure as a mom/parent. Pt confirmed that her son expressed forgiveness, but repeatedly stated she couldn't forgive herself. Pt acknowledged that this is in part due to her issues with guilt, and appears to be a source of her inability to let the past go. Pt agreed that working at being at peace with the past is more approachable then forgiving herself. Pt also discussed how she has been struggling to do things since that conversation, such as get out of bed and get her med refills, which sounded like depression based behavior patterns. Therapist brought this up to pt would accepted/considered it could be possible. Pt was encouraged to continue to do  things even when not feeling like it and utilize opposite action. Pt was more focused today and able to stay more on topic, needing less redirection.      Self-care and forgiveness are an ongoing issue that pt doesn't feel she can do, and identifying feelings of anxiety may be worth exploring once ADHD is more under control.     Treatment Objective(s) Addressed in This Session:   identify unhepful thinking, triggers, and stressors which contribute to feelings of depression  identify patterns of escalation  (i.e. tightness in chest, flushed face, increased heart rate, clenched hands, etc.)  use cognitive strategies identified in therapy to challenge anxious thoughts  Decrease frequency and intensity of feeling down, depressed, hopeless  Improve quantity and quality of night time sleep / decrease daytime naps  Identify negative self-talk and behaviors: challenge core beliefs, myths, and actions     Intervention:   CBT: Discussed thoughts and feelings interacting around guilt and the past   Psychodynamic: Processed through internal-experiences related to family difficulties and patterns of behavior that contribute to stress levels.    Motivational Interviewing    MI Intervention: Expressed Empathy/Understanding, Supported Autonomy, Collaboration, Evocation, Permission to raise concern or advise, Open-ended questions, Reflections: simple and complex and Reframe     Change Talk Expressed by the Patient: Reasons to change Need to change Committment to change    Provider Response to Change Talk: E - Evoked more info from patient about behavior change, A - Affirmed patient's thoughts, decisions, or attempts at behavior change, R - Reflected patient's change talk and S - Summarized patient's change talk statements    Assessments completed prior to visit:  PHQ2:   PHQ-2 ( 1999 [employer]) 11/21/2019 10/1/2019   Q1: Little interest or pleasure in doing things 1 1   Q2: Feeling down, depressed or hopeless 1 2   PHQ-2 Score 2 3    PHQ-2 Total Score (12-17 Years)- Positive if 3 or more points; Administer PHQ-A if positive 2 3     PHQ9:   PHQ-9 SCORE 10/1/2019 11/21/2019 10/18/2021 11/29/2021 1/6/2022 3/18/2022 4/20/2022   PHQ-9 Total Score MyChart - - - 14 (Moderate depression) 21 (Severe depression) 17 (Moderately severe depression) 12 (Moderate depression)   PHQ-9 Total Score 16 12 19 14 21 17 12     GAD2:   QUIN-2 1/6/2022 2/6/2022 2/6/2022 3/18/2022 4/20/2022   Feeling nervous, anxious, or on edge 2 2 2 1 1   Not being able to stop or control worrying 3 3 3 3 1   QUIN-2 Total Score 5 5 5 4 2     GAD7:   QUIN-7 SCORE 10/18/2021 11/29/2021 1/6/2022 2/6/2022 2/6/2022 3/18/2022   Total Score - 9 (mild anxiety) 14 (moderate anxiety) - 16 (severe anxiety) 13 (moderate anxiety)   Total Score 15 9 14 16 16 13     PROMIS 10-Global Health (only subscores and total score):   PROMIS-10 Scores Only 1/6/2022   Global Mental Health Score 6   Global Physical Health Score 11   PROMIS TOTAL - SUBSCORES 17         ASSESSMENT: Current Emotional / Mental Status (status of significant symptoms):   Risk status (Self / Other harm or suicidal ideation)   Patient denies current fears or concerns for personal safety.   Patient denies current or recent suicidal ideation or behaviors.   Patient denies current or recent homicidal ideation or behaviors.   Patient denies current or recent self injurious behavior or ideation.   Patient denies other safety concerns.   Patient reports there has been no change in risk factors since their last session.     Patient reports there has been no change in protective factors since their last session.     Recommended that patient call 911 or go to the local ED should there be a change in any of these risk factors.     Appearance:   unable to assess    Eye Contact:   unable to assess    Psychomotor Behavior: unable to assess    Attitude:   Cooperative  Interested [place] Evasive   Orientation:   All   Speech    Rate /  Production: Normal/ Responsive Emotional Talkative    Volume:  Normal    Mood:    Depressed  Normal   Affect:    Appropriate  Tearful   Thought Content:  Clear    Thought Form:  Coherent  Logical    Insight:    Fair      Medication Review:   No changes to current psychiatric medication(s)     Medication Compliance:   Yes but needed encouragement to continue being consistant     Changes in Health Issues:   None reported     Chemical Use Review:   Substance Use: Chemical use reviewed, no active concerns identified      Tobacco Use: No change in amount of tobacco use since last session.  Patient declined discussion at this time    Diagnosis:  1. Attention deficit hyperactivity disorder, inattentive type, moderate    2. Major depressive disorder, recurrent episode, moderate (H)        Collateral Reports Completed:   Not Applicable    PLAN: (Patient Tasks / Therapist Tasks / Other)  Keep a consistent sleep schedule, bedtime 10-11 and wake time 6 am - don't take naps  Do things even when you don't want to, opposite action  Read out loud to study for exam/test (infrastructure) - respond to counselor   Work on accepting the past is the past and being at peace with that    Chavez Farmer [facility]    ______________________________________________________________________    Individual Treatment Plan    Patient's Name: Mason Crowe  YOB: 1975    Date of Creation: 11/8/21  Date Treatment Plan Last Reviewed/Revised: 4/7/22    DSM5 Diagnoses: Attention-Deficit/Hyperactivity Disorder  314.01 (F90.2) Combined presentation or 296.32 (F33.1) Major Depressive Disorder, Recurrent Episode, Moderate With anxious distress  Psychosocial / Contextual Factors: pt's inability to complete tasks and focus to the point she can't work or help her mother, which is likely contributing to her depressive symptoms. Pt is currently undergoing ADHD assessment that still needs to be finished, but pt has not been able to focus on task  related to it to complete it.    WHODAS:   WHODAS 2.0 Total Score 10/12/2021   Total Score 45   Total Score MyChart 45   PROMIS (reviewed every 90 days):   PROMIS 10-Global Health (only subscores and total score):   PROMIS-10 Scores Only 1/6/2022   Global Mental Health Score 6   Global Physical Health Score 11   PROMIS TOTAL - SUBSCORES 17       Referral / Collaboration:  Referral to another professional/service is not indicated at this time. This will be reviewed/reconsidered on an ongoing basis over the next 90 days to determine if a referral is appropriate for additional services and will be discussed with pt at that time if one is deemed necessary prior to next treatment plan update/review.    Anticipated number of session for this episode of care: 13  Anticipation frequency of session: Weekly  Anticipated Duration of each session: 38-52 minutes (Pt sessions tend to go over time due to complex/maladaptive communication related to MH symptoms, ongoing attempts to redirect to ending by the anticipated time will continue)  Treatment plan will be reviewed in 90 days or when goals have been changed.     MeasurableTreatment Goal(s) related to diagnosis / functional impairment(s)  Goal 1: Patient will improve in task completion rate and organization.    I will know I've met my goal when I am able to get things done in a single day.      Objective #A Use daily planner each day to identify tasks to complete.    Patient will Use clanaders to stay organized and plan things out (at least 50%)  Increase interest, engagement, and pleasure in doing things  Improve concentration, focus, and mindfulness in daily activities .  Status: Continued - Date(s): 4/7/22     Intervention(s)  Therapist will assign homework between each session related to topic matter  teach strategies related to behavior activitation and habit forming skills/strategies.    Objective #B Listen to music or other simultaneous activity to improve  concentration/focus  Patient will identify activites she can do simultaniously to help her stay on task  Increase interest, engagement, and pleasure in doing things  Identify negative self-talk and behaviors: challenge core beliefs, myths, and actions  Feel less fidgety, restless or slow in daily activities / interpersonal interactions.  Status: Continued - Date(s): 4/7/22     Intervention(s)  Therapist will assign homework between session related to topic matter.  Assist in identifying and developing cocurrent behaviors that are conducive to staying on task.    Objective #C Complete follow-through on commitments   Patient will complete her ADHD assessment and paperwork related to ongoing sobriety/education/work related objectives.  Status: Completed - Date: 4/7/22     Intervention(s)  Therapist will assign homework between sessions related to topic matter  make referrals to psychiatry and ADHD   assist in identifying barriers to success.      Goal 2: Patient will improve quality of sleep and reduce daytime sleepiness.    I will know I've met my goal when I can stay awake during at work and get to bed at a consistent time.      Objective #A Use CPAP machine consistently throughout the week.    Status: Continued - Date(s): 4/7/22     Patient will Improve quantity and quality of night time sleep / decrease daytime naps  Feel less tired and more energy during the day .    Intervention(s)  Therapist will assign homework between session related to topic.  provide educational materials on sleep hygiene strategies and the impact of sleep on health.    Objective #B Develop a sleep routine/habits that better supports a work/life balance  Patient will identify and use habit/routine forming strategies to improve organization  Improve quantity and quality of night time sleep / decrease daytime naps  Feel less tired and more energy during the day   Improve concentration, focus, and mindfulness in daily activities  .    Status: Continued - Date(s): 4/7/22     Intervention(s)  Therapist will assign homework between session related to topic matter.  teach the client how to perform a behavioral chain analysis. Developing strategies to promote better sleep routine..      Goal 3: Patient will have fewer days of feeling hopeless or worthless    I will know I've met my goal when I am able to not get stuck in feelings of depression.      Objective #A Better manage feelings of guilt and sadness related to past, and improve self-esteem/worth  Status: Continued - Date(s): 4/7/22     Patient will identify unhelpful thinking, triggers, and stressors which contribute to feelings of depression  identify patterns of escalation  (i.e. tightness in chest, flushed face, increased heart rate, clenched hands, etc.)  Increase interest, engagement, and pleasure in doing things  Decrease frequency and intensity of feeling down, depressed, hopeless  Improve quantity and quality of night time sleep / decrease daytime naps  Identify negative self-talk and behaviors: challenge core beliefs, myths, and actions  Improve concentration, focus, and mindfulness in daily activities   identify coping strategies for improving mood.    Intervention(s)  Therapist will assign homework between session related to topic matter  teach emotional recognition/identification. recongizing triggers and ways to be proactive in coping with them.    Patient has reviewed and agreed to the above plan.      Chavez Farmer [facility], Provider Oversight:  Dr. Manjinder Thompson  November 8, 2021

## 2022-04-26 ENCOUNTER — VIRTUAL VISIT (OUTPATIENT)
Dept: BEHAVIORAL HEALTH | Facility: HOSPITAL | Age: 47
End: 2022-04-26
Attending: PSYCHIATRY & NEUROLOGY
Payer: COMMERCIAL

## 2022-04-26 DIAGNOSIS — F33.1 MAJOR DEPRESSIVE DISORDER, RECURRENT EPISODE, MODERATE (H): ICD-10-CM

## 2022-04-26 DIAGNOSIS — F41.9 ANXIETY: ICD-10-CM

## 2022-04-26 DIAGNOSIS — F90.0 ATTENTION DEFICIT HYPERACTIVITY DISORDER, INATTENTIVE TYPE, MODERATE: Primary | ICD-10-CM

## 2022-04-26 PROCEDURE — 90837 PSYTX W PT 60 MINUTES: CPT | Mod: TEL | Performed by: COUNSELOR

## 2022-04-26 RX ORDER — HYDROXYZINE HYDROCHLORIDE 25 MG/1
TABLET, FILM COATED ORAL
Qty: 180 TABLET | Refills: 0 | OUTPATIENT
Start: 2022-04-26

## 2022-04-26 NOTE — PROGRESS NOTES
"-    [facility]                                     Progress Note    Patient Name: Mason Crowe  Date: 4/26/22         Service Type: Individual      Session Start Time: 1:00 pm  Session End Time: 1:59 pm     Session Length: 59 minutes    Session #: 12    Attendees: Client attended alone    Service Modality:  Phone Visit:      Provider verified identity through the following two step process.  Patient provided:  Patient is known previously to provider    The patient has been notified of the following:      \"We have found that certain health care needs can be provided without the need for a face to face visit.  This service lets us provide the care you need with a phone conversation.       I will have full access to your [facility] medical record during this entire phone call.   I will be taking notes for your medical record.      Since this is like an office visit, we will bill your insurance company for this service.       There are potential benefits and risks of telephone visits (e.g. limits to patient confidentiality) that differ from in-person visits.?Confidentiality still applies for telephone services, and nobody will record the visit.  It is important to be in a quiet, private space that is free of distractions (including cell phone or other devices) during the visit.??      If during the course of the call I believe a telephone visit is not appropriate, you will not be charged for this service\"     Consent has been obtained for this service by care team member: Yes     DATA  Interactive Complexity: Yes, visit entailed Interactive Complexity evidenced by:  -The need to manage maladaptive communication (related to, e.g., high anxiety, high reactivity, repeated questions, or disagreement) among participants that complicates delivery of care  Crisis: No        Progress Since Last Session (Related to Symptoms / Goals / Homework):   Symptoms: Improving symptoms, pt was able to spend " "some quality time with her mom and has been feeling better this week    Homework: Partially completed      Episode of Care Goals: Minimal progress - PREPARATION (Decided to change - considering how); Intervened by negotiating a change plan and determining options / strategies for behavior change, identifying triggers, exploring social supports, and working towards setting a date to begin behavior change     Current / Ongoing Stressors and Concerns:   Pt is currently seeking therapy due to ongoing depression and ADHD related symptoms. Since last session, pt reported that she was able to spend some quality time with her mom and this helped her feel a lot better this week. Pt expressed feeling very thankful to be spending time with her mom at the age of 70. Pt reflected on the conversation that was had last session about coping and guilt, expressing that the guilt isn't always present except when it is triggered occasionally. Pt may have been minimizing the impact of her guilt feelings on her thinking related to the past. Pt talked about her weight and self-image issues, stating that she went to [place] this week to get some medication based help but wasn't able to get it due to not being something [place] provides. She was referred back to the [facility]ariat[facility] center, whom should be able to address some this. Pt processed through her concerns related to her weight and not feeling healthy. Pt did report that her sleep has gotten better, but is still not where it needs to be. Pt acknowledged that her presentation has improved, but still feels that she isn't where she needs/wants to be to function \"normally\". Pt processed through how her ADHD has been going and what she can/needs to do to better manage it, including getting better quality sleep, discuss medication changes with her doctor, consistency in behavior changes, and getting additional dedicated memory aids. Pt was more focused today, picking two things to cover " prior to session helped, and able to stay more on topic, but still needed redirection. Session went longer then anticipated due to complex communication related to ADHD symptoms/presentation.     Self-care and forgiveness are an ongoing issue that pt doesn't feel she can do, and identifying feelings of anxiety may be worth exploring once ADHD is more under control.     Treatment Objective(s) Addressed in This Session:   identify unhepful thinking, triggers, and stressors which contribute to feelings of depression  identify patterns of escalation  (i.e. tightness in chest, flushed face, increased heart rate, clenched hands, etc.)  use cognitive strategies identified in therapy to challenge anxious thoughts  Decrease frequency and intensity of feeling down, depressed, hopeless  Improve quantity and quality of night time sleep / decrease daytime naps  Identify negative self-talk and behaviors: challenge core beliefs, myths, and actions     Intervention:   CBT: Discussed thoughts and feelings interacting around guilt and the past   Psychodynamic: Processed through internal-experiences related to family difficulties and patterns of behavior that contribute to stress levels.    Motivational Interviewing    MI Intervention: Expressed Empathy/Understanding, Supported Autonomy, Collaboration, Evocation, Permission to raise concern or advise, Open-ended questions, Reflections: simple and complex and Change talk (evoked)     Change Talk Expressed by the Patient: Reasons to change Need to change Committment to change Activation    Provider Response to Change Talk: E - Evoked more info from patient about behavior change, A - Affirmed patient's thoughts, decisions, or attempts at behavior change, R - Reflected patient's change talk and S - Summarized patient's change talk statements    Assessments completed prior to visit:  PHQ2:   PHQ-2 ( 1999 [employer]) 11/21/2019 10/1/2019   Q1: Little interest or pleasure in doing things 1 1    Q2: Feeling down, depressed or hopeless 1 2   PHQ-2 Score 2 3   PHQ-2 Total Score (12-17 Years)- Positive if 3 or more points; Administer PHQ-A if positive 2 3     PHQ9:   PHQ-9 SCORE 10/1/2019 11/21/2019 10/18/2021 11/29/2021 1/6/2022 3/18/2022 4/20/2022   PHQ-9 Total Score MyChart - - - 14 (Moderate depression) 21 (Severe depression) 17 (Moderately severe depression) 12 (Moderate depression)   PHQ-9 Total Score 16 12 19 14 21 17 12     GAD2:   QUIN-2 1/6/2022 2/6/2022 2/6/2022 3/18/2022 4/20/2022   Feeling nervous, anxious, or on edge 2 2 2 1 1   Not being able to stop or control worrying 3 3 3 3 1   QUIN-2 Total Score 5 5 5 4 2     GAD7:   QUIN-7 SCORE 10/18/2021 11/29/2021 1/6/2022 2/6/2022 2/6/2022 3/18/2022   Total Score - 9 (mild anxiety) 14 (moderate anxiety) - 16 (severe anxiety) 13 (moderate anxiety)   Total Score 15 9 14 16 16 13     PROMIS 10-Global Health (only subscores and total score):   PROMIS-10 Scores Only 1/6/2022   Global Mental Health Score 6   Global Physical Health Score 11   PROMIS TOTAL - SUBSCORES 17         ASSESSMENT: Current Emotional / Mental Status (status of significant symptoms):   Risk status (Self / Other harm or suicidal ideation)   Patient denies current fears or concerns for personal safety.   Patient denies current or recent suicidal ideation or behaviors.   Patient denies current or recent homicidal ideation or behaviors.   Patient denies current or recent self injurious behavior or ideation.   Patient denies other safety concerns.   Patient reports there has been no change in risk factors since their last session.     Patient reports there has been no change in protective factors since their last session.     Recommended that patient call 911 or go to the local ED should there be a change in any of these risk factors.     Appearance:   unable to assess    Eye Contact:   unable to assess    Psychomotor Behavior: unable to assess    Attitude:   Cooperative  Interested Friendly  [place]   Orientation:   All   Speech    Rate / Production: Normal/ Responsive Talkative    Volume:  Normal    Mood:    Anxious  Normal   Affect:    Appropriate    Thought Content:  Clear    Thought Form:  Coherent  Logical    Insight:    Fair      Medication Review:   No changes to current psychiatric medication(s)     Medication Compliance:   Yes but needed encouragement to continue being consistant     Changes in Health Issues:   Yes: Weight / dietary issues, Associated Psychological Distress     Chemical Use Review:   Substance Use: Chemical use reviewed, no active concerns identified      Tobacco Use: No change in amount of tobacco use since last session.  Patient declined discussion at this time    Diagnosis:  1. Attention deficit hyperactivity disorder, inattentive type, moderate    2. Major depressive disorder, recurrent episode, moderate (H)        Collateral Reports Completed:   Not Applicable    PLAN: (Patient Tasks / Therapist Tasks / Other)  Keep a consistent sleep schedule, bedtime 10-11 and don't take naps during the day  Avoid drinking 2 hours before bed and use bathroom before sleep so reduce disrupted sleep pattern  Follow-up on bariatric surgery center referal  Write things down in small notebook (memory aid)  Read out loud to study for exam/test (infrastructure) - respond to counselor     Chavez Farmer [facility]    ______________________________________________________________________    Individual Treatment Plan    Patient's Name: Mason Crowe  YOB: 1975    Date of Creation: 11/8/21  Date Treatment Plan Last Reviewed/Revised: 4/7/22    DSM5 Diagnoses: Attention-Deficit/Hyperactivity Disorder  314.01 (F90.2) Combined presentation or 296.32 (F33.1) Major Depressive Disorder, Recurrent Episode, Moderate With anxious distress  Psychosocial / Contextual Factors: pt's inability to complete tasks and focus to the point she can't work or help her mother, which is likely  contributing to her depressive symptoms. Pt is currently undergoing ADHD assessment that still needs to be finished, but pt has not been able to focus on task related to it to complete it.    WHODAS:   WHODAS 2.0 Total Score 10/12/2021   Total Score 45   Total Score MyChart 45   PROMIS (reviewed every 90 days):   PROMIS 10-Global Health (only subscores and total score):   PROMIS-10 Scores Only 1/6/2022   Global Mental Health Score 6   Global Physical Health Score 11   PROMIS TOTAL - SUBSCORES 17       Referral / Collaboration:  Referral to another professional/service is not indicated at this time. This will be reviewed/reconsidered on an ongoing basis over the next 90 days to determine if a referral is appropriate for additional services and will be discussed with pt at that time if one is deemed necessary prior to next treatment plan update/review.    Anticipated number of session for this episode of care: 13  Anticipation frequency of session: Weekly  Anticipated Duration of each session: 38-52 minutes (Pt sessions tend to go over time due to complex/maladaptive communication related to MH symptoms, ongoing attempts to redirect to ending by the anticipated time will continue)  Treatment plan will be reviewed in 90 days or when goals have been changed.     MeasurableTreatment Goal(s) related to diagnosis / functional impairment(s)  Goal 1: Patient will improve in task completion rate and organization.    I will know I've met my goal when I am able to get things done in a single day.      Objective #A Use daily planner each day to identify tasks to complete.    Patient will Use clanaders to stay organized and plan things out (at least 50%)  Increase interest, engagement, and pleasure in doing things  Improve concentration, focus, and mindfulness in daily activities .  Status: Continued - Date(s): 4/7/22     Intervention(s)  Therapist will assign homework between each session related to topic matter  teach  strategies related to behavior activitation and habit forming skills/strategies.    Objective #B Listen to music or other simultaneous activity to improve concentration/focus  Patient will identify activites she can do simultaniously to help her stay on task  Increase interest, engagement, and pleasure in doing things  Identify negative self-talk and behaviors: challenge core beliefs, myths, and actions  Feel less fidgety, restless or slow in daily activities / interpersonal interactions.  Status: Continued - Date(s): 4/7/22     Intervention(s)  Therapist will assign homework between session related to topic matter.  Assist in identifying and developing cocurrent behaviors that are conducive to staying on task.    Objective #C Complete follow-through on commitments   Patient will complete her ADHD assessment and paperwork related to ongoing sobriety/education/work related objectives.  Status: Completed - Date: 4/7/22     Intervention(s)  Therapist will assign homework between sessions related to topic matter  make referrals to psychiatry and ADHD   assist in identifying barriers to success.      Goal 2: Patient will improve quality of sleep and reduce daytime sleepiness.    I will know I've met my goal when I can stay awake during at work and get to bed at a consistent time.      Objective #A Use CPAP machine consistently throughout the week.    Status: Continued - Date(s): 4/7/22     Patient will Improve quantity and quality of night time sleep / decrease daytime naps  Feel less tired and more energy during the day .    Intervention(s)  Therapist will assign homework between session related to topic.  provide educational materials on sleep hygiene strategies and the impact of sleep on health.    Objective #B Develop a sleep routine/habits that better supports a work/life balance  Patient will identify and use habit/routine forming strategies to improve organization  Improve quantity and quality of night  time sleep / decrease daytime naps  Feel less tired and more energy during the day   Improve concentration, focus, and mindfulness in daily activities .    Status: Continued - Date(s): 4/7/22     Intervention(s)  Therapist will assign homework between session related to topic matter.  teach the client how to perform a behavioral chain analysis. Developing strategies to promote better sleep routine..      Goal 3: Patient will have fewer days of feeling hopeless or worthless    I will know I've met my goal when I am able to not get stuck in feelings of depression.      Objective #A Better manage feelings of guilt and sadness related to past, and improve self-esteem/worth  Status: Continued - Date(s): 4/7/22     Patient will identify unhelpful thinking, triggers, and stressors which contribute to feelings of depression  identify patterns of escalation  (i.e. tightness in chest, flushed face, increased heart rate, clenched hands, etc.)  Increase interest, engagement, and pleasure in doing things  Decrease frequency and intensity of feeling down, depressed, hopeless  Improve quantity and quality of night time sleep / decrease daytime naps  Identify negative self-talk and behaviors: challenge core beliefs, myths, and actions  Improve concentration, focus, and mindfulness in daily activities   identify coping strategies for improving mood.    Intervention(s)  Therapist will assign homework between session related to topic matter  teach emotional recognition/identification. recongizing triggers and ways to be proactive in coping with them.    Patient has reviewed and agreed to the above plan.      Chavez Farmer [facility]  November 8, 2021

## 2022-04-29 ENCOUNTER — VIRTUAL VISIT (OUTPATIENT)
Dept: PSYCHIATRY | Facility: CLINIC | Age: 47
End: 2022-04-29
Attending: NURSE PRACTITIONER
Payer: COMMERCIAL

## 2022-04-29 DIAGNOSIS — G47.00 INSOMNIA, UNSPECIFIED TYPE: ICD-10-CM

## 2022-04-29 DIAGNOSIS — F41.9 ANXIETY: ICD-10-CM

## 2022-04-29 DIAGNOSIS — F90.8 ATTENTION DEFICIT HYPERACTIVITY DISORDER (ADHD), OTHER TYPE: Primary | ICD-10-CM

## 2022-04-29 DIAGNOSIS — F33.1 MODERATE EPISODE OF RECURRENT MAJOR DEPRESSIVE DISORDER (H): ICD-10-CM

## 2022-04-29 PROCEDURE — 99214 OFFICE O/P EST MOD 30 MIN: CPT | Mod: GT | Performed by: NURSE PRACTITIONER

## 2022-04-29 RX ORDER — GABAPENTIN 400 MG/1
800 CAPSULE ORAL 3 TIMES DAILY
Qty: 180 CAPSULE | Refills: 1 | Status: SHIPPED | OUTPATIENT
Start: 2022-04-29 | End: 2022-06-24

## 2022-04-29 RX ORDER — METHYLPHENIDATE HYDROCHLORIDE 36 MG/1
36 TABLET ORAL DAILY
Qty: 30 TABLET | Refills: 0 | Status: SHIPPED | OUTPATIENT
Start: 2022-06-14 | End: 2022-06-24 | Stop reason: DRUGHIGH

## 2022-04-29 RX ORDER — TRAZODONE HYDROCHLORIDE 50 MG/1
TABLET, FILM COATED ORAL
Qty: 30 TABLET | Refills: 1 | Status: SHIPPED | OUTPATIENT
Start: 2022-04-29 | End: 2022-06-24

## 2022-04-29 RX ORDER — METHYLPHENIDATE HYDROCHLORIDE 36 MG/1
36 TABLET ORAL DAILY
Qty: 30 TABLET | Refills: 0 | Status: SHIPPED | OUTPATIENT
Start: 2022-05-17 | End: 2022-06-16

## 2022-04-29 RX ORDER — HYDROXYZINE HYDROCHLORIDE 25 MG/1
25-50 TABLET, FILM COATED ORAL EVERY 8 HOURS PRN
Qty: 180 TABLET | Refills: 1 | Status: SHIPPED | OUTPATIENT
Start: 2022-04-29 | End: 2022-06-24

## 2022-04-29 RX ORDER — VENLAFAXINE HYDROCHLORIDE 37.5 MG/1
75 CAPSULE, EXTENDED RELEASE ORAL DAILY
Qty: 60 CAPSULE | Refills: 1 | Status: SHIPPED | OUTPATIENT
Start: 2022-04-29 | End: 2022-06-24

## 2022-04-29 ASSESSMENT — PATIENT HEALTH QUESTIONNAIRE - PHQ9
10. IF YOU CHECKED OFF ANY PROBLEMS, HOW DIFFICULT HAVE THESE PROBLEMS MADE IT FOR YOU TO DO YOUR WORK, TAKE CARE OF THINGS AT HOME, OR GET ALONG WITH OTHER PEOPLE: VERY DIFFICULT
SUM OF ALL RESPONSES TO PHQ QUESTIONS 1-9: 11
SUM OF ALL RESPONSES TO PHQ QUESTIONS 1-9: 11
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
## 2022-04-29 NOTE — PROGRESS NOTES
Mason Crowe is a 46 year old who has consented to receive services via billable video visit.      Pt will join video visit via: FindIt  If there are problems joining the visit, send backup video invite via: Text to preferred phone: [phone]      Originating Location (patient location): Patient's home  Distant Location (provider location): [facility] [place] [facility]    Will anyone else be joining the video visit? No    How would you prefer to obtain AVS?: Keith

## 2022-04-29 NOTE — PATIENT INSTRUCTIONS
-Increase Concerta to 36 mg daily for ADHD when you are done with current 27 mg dose.  Pls continue to monitor blood pressure and if this is consistently in 140'2/90's, pls contact the clinic.  -Continue all other medication regimen    -Complete EKG at primary care appointment at the end of May.    Your next appointment is scheduled on 6/24/2022 (Fri) at 10am.      **For crisis resources, please see the information at the end of this document**   Patient Education    Thank you for coming to the [facility] [place] [facility].    Lab Testing:  If you had lab testing today and your results are reassuring or normal they will be mailed to you or sent through [employer] within 7 days. If the lab tests need quick action we will call you with the results. The phone number we will call with results is # [phone]. If this is not the best number please call our clinic and change the number.     Medication Refills:  If you need any refills please call your pharmacy and they will contact us. Our fax number for refills is [phone]. Please allow three business days for refill processing.   If you need to change to a different pharmacy, please contact the new pharmacy directly. The new pharmacy will help you get your medications transferred.     Contact Us:  Please call [phone] during business hours (8-5:00 M-F).  If you have medication related questions after clinic hours, or on the weekend, please call [phone].    Financial Assistance [phone]  Medical Records [phone]       MENTAL HEALTH CRISIS RESOURCES:  For a emergency help, please call 911 or go to the nearest Emergency Department.     Emergency Walk-In Options:   EmPATH Unit @ [facility] ([place]): [phone] - Specialized mental health emergency area designed to be calming  [facility] ([place]): [phone]  [facility] ([place]): [phone]  [facility] ([place]): [phone]    [facility] Crisis Information:   [place]: [phone]  [place]: [phone]  [place] ([facility]) - Adult: [phone]     Child: [phone]  [place] - Adult: [phone]     Child: [phone]  [place]: [phone]  List of all [facility] resources:   https://mn.[facility]/dhs/people-we-serve/adults/health-care/mental-health/resources/crisis-contacts.jsp    National Crisis Information:   Crisis Text Line: Text  [place]  to 378545  National Suicide Prevention Lifeline: 5-589-240-VUHN (1-129.944.2512)       For online chat options, visit https://suicidepreventionlifeline.org/chat/  Poison Control Center: 1-652.960.6630  Trans Lifeline: 1-889.897.6003 - Hotline for transgender people of all ages  The Minh Project: 1-758.335.4948 - Hotline for LGBT youth     For Non-Emergency Support:   Fast Tracker: Mental Health & Substance Use Disorder Resources -   https://www.fasttrackThe Innovation Factoryn.org/          **For crisis resources, please see the information at the end of this document**   Patient Education    Thank you for coming to the [facility] [place] [facility].    Lab Testing:  If you had lab testing today and your results are reassuring or normal they will be mailed to you or sent through [employer] within 7 days. If the lab tests need quick action we will call you with the results. The phone number we will call with results is # [phone]. If this is not the best number please call our clinic and change the number.     Medication Refills:  If you need any refills please call your pharmacy and they will contact us. Our fax number for refills is [phone]. Please allow three business days for refill processing.   If you need to change to a different pharmacy, please contact the new pharmacy directly. The new pharmacy will help you get your medications transferred.     Contact Us:  Please call [phone] during business hours (8-5:00 M-F).  If you have medication related  questions after clinic hours, or on the weekend, please call [phone].    Financial Assistance [phone]  Medical Records [phone]       MENTAL HEALTH CRISIS RESOURCES:  For a emergency help, please call 911 or go to the nearest Emergency Department.     Emergency Walk-In Options:   EmPATH Unit @ [facility] ([place]): [phone] - Specialized mental health emergency area designed to be calming  [facility] ([place]): [phone]  [facility] Psychiat[facility] ([place]): [phone]  [facility] ([place]): [phone]    [facility] Crisis Information:   [place]: [phone]  [place]: [phone]  [place] ([facility]) - Adult: [phone]     Child: [phone]  [place] - Adult: [phone]     Child: [phone]  [place]: [phone]  List of all [facility] resources:   https://mn.[facility]/dhs/people-we-serve/adults/health-care/mental-health/resources/crisis-contacts.jsp    National Crisis Information:   Crisis Text Line: Text  [place]  to 236880  National Suicide Prevention Lifeline: 1-740-875-HMLZ (1-304.802.6245)       For online chat options, visit https://suicidepreventionlifeline.org/chat/  Poison Control Center: 1-111.550.3651  Trans Lifeline: 1-179-347-2684 - Hotline for transgender people of all ages  The Mihn Project: 1-687-825-4534 - Hotline for LGBT youth     For Non-Emergency Support:   Fast Tracker: Mental Health & Substance Use Disorder Resources -   https://www.Opalityn.org/

## 2022-04-30 ASSESSMENT — PATIENT HEALTH QUESTIONNAIRE - PHQ9: SUM OF ALL RESPONSES TO PHQ QUESTIONS 1-9: 11

## 2022-05-05 ENCOUNTER — VIRTUAL VISIT (OUTPATIENT)
Dept: BEHAVIORAL HEALTH | Facility: HOSPITAL | Age: 47
End: 2022-05-05
Attending: PSYCHIATRY & NEUROLOGY
Payer: COMMERCIAL

## 2022-05-05 DIAGNOSIS — F33.1 MAJOR DEPRESSIVE DISORDER, RECURRENT EPISODE, MODERATE (H): ICD-10-CM

## 2022-05-05 DIAGNOSIS — F90.0 ATTENTION DEFICIT HYPERACTIVITY DISORDER, INATTENTIVE TYPE, MODERATE: Primary | ICD-10-CM

## 2022-05-05 PROCEDURE — 90837 PSYTX W PT 60 MINUTES: CPT | Mod: TEL | Performed by: COUNSELOR

## 2022-05-05 NOTE — PROGRESS NOTES
"-    [facility]                                     Progress Note    Patient Name: Mason Crowe  Date: 5/05/22         Service Type: Individual      Session Start Time: 9:00 am  Session End Time: 9:58 am     Session Length: 58 minutes    Session #: 13    Attendees: Client attended alone    Service Modality:  Phone Visit:      Provider verified identity through the following two step process.  Patient provided:  Patient is known previously to provider    The patient has been notified of the following:      \"We have found that certain health care needs can be provided without the need for a face to face visit.  This service lets us provide the care you need with a phone conversation.       I will have full access to your  [facility] medical record during this entire phone call.   I will be taking notes for your medical record.      Since this is like an office visit, we will bill your insurance company for this service.       There are potential benefits and risks of telephone visits (e.g. limits to patient confidentiality) that differ from in-person visits.?Confidentiality still applies for telephone services, and nobody will record the visit.  It is important to be in a quiet, private space that is free of distractions (including cell phone or other devices) during the visit.??      If during the course of the call I believe a telephone visit is not appropriate, you will not be charged for this service\"     Consent has been obtained for this service by care team member: Yes     DATA  Interactive Complexity: Yes, visit entailed Interactive Complexity evidenced by:  -The need to manage maladaptive communication (related to, e.g., high anxiety, high reactivity, repeated questions, or disagreement) among participants that complicates delivery of care  Crisis: No        Progress Since Last Session (Related to Symptoms / Goals / Homework):   Symptoms: Worsening ADHD symptoms with increased " "inattentiveness and poor task completion    Homework: Partially completed      Episode of Care Goals: Minimal progress - PREPARATION (Decided to change - considering how); Intervened by negotiating a change plan and determining options / strategies for behavior change, identifying triggers, exploring social supports, and working towards setting a date to begin behavior change     Current / Ongoing Stressors and Concerns:   Pt is currently seeking therapy due to ongoing depression and ADHD related symptoms. Since last session, pt reported that she hasn't been able to remember much of what she did this last week. Pt feels that she hasn't been very productive, but was less frustrated with herself as this it \"the new norm\" for her. Pt processed through this and discussed how her strategies to use memory aids has been going. Based on her report pt does not sound consistent in her application of memory aids, week to week, and admitted to procrastinating this past week more then usual. Pt also reviewed how her sleep has been going, and that has been only OK with inconsistent bed-time. Strategies to address her ADHD were reviewed again with consistency in use of memory aid tools, routine, and quality sleep being reiterated as key factors in her ADHD symptom variance. Pt was accepting of this and after talking about it did express some successes this past week with follow-through. During today session pt sounded more calm and less anxious, possibly even depressed. Pt was unsure how her mood was doing when asked and was unsure how she would compare it to last week. Pt has to meet with her mom this weekend to talk \"business\" and has a plan to help stay on task when meeting with her, as that has been difficult these last couple weeks. Pt had an easier time staying on topic today and needed little redirection, but it did take extra time for pt to start talking about what she wanted to address in session which caused it to go " over the anticipated length of time.    Self-care and forgiveness are an ongoing issue that pt doesn't feel she can do, and identifying feelings of anxiety may be worth exploring once ADHD is more under control.     Memory Aids:  Living room and bedroom calenders: appointments and important dates  Bathroom whiteboard: daily tasks  Small Notebook (work in progress): on hand to remember     Treatment Objective(s) Addressed in This Session:   identify unhepful thinking, triggers, and stressors which contribute to feelings of depression  identify patterns of escalation  (i.e. tightness in chest, flushed face, increased heart rate, clenched hands, etc.)  use cognitive strategies identified in therapy to challenge anxious thoughts  Decrease frequency and intensity of feeling down, depressed, hopeless  Improve quantity and quality of night time sleep / decrease daytime naps  Identify negative self-talk and behaviors: challenge core beliefs, myths, and actions     Intervention:   CBT: Reviewed environmental factors and behavior patterns that aren't helpful in managing ADHD   Psychodynamic: Processed through internal-experiences related to family difficulties and patterns of behavior that contribute to stress levels.    Motivational Interviewing    MI Intervention: Expressed Empathy/Understanding, Supported Autonomy, Collaboration, Evocation, Permission to raise concern or advise, Open-ended questions and Reflections: simple and complex     Change Talk Expressed by the Patient: Reasons to change Need to change Committment to change Activation    Provider Response to Change Talk: E - Evoked more info from patient about behavior change, A - Affirmed patient's thoughts, decisions, or attempts at behavior change, R - Reflected patient's change talk and S - Summarized patient's change talk statements    Assessments completed prior to visit:  PHQ2:   PHQ-2 ( 1999 [employer]) 11/21/2019 10/1/2019   Q1: Little interest or pleasure in  doing things 1 1   Q2: Feeling down, depressed or hopeless 1 2   PHQ-2 Score 2 3   PHQ-2 Total Score (12-17 Years)- Positive if 3 or more points; Administer PHQ-A if positive 2 3     PHQ9:   PHQ-9 SCORE 11/21/2019 10/18/2021 11/29/2021 1/6/2022 3/18/2022 4/20/2022 4/29/2022   PHQ-9 Total Score MyChart - - 14 (Moderate depression) 21 (Severe depression) 17 (Moderately severe depression) 12 (Moderate depression) 11 (Moderate depression)   PHQ-9 Total Score 12 19 14 21 17 12 11     GAD2:   QUIN-2 1/6/2022 2/6/2022 2/6/2022 3/18/2022 4/20/2022   Feeling nervous, anxious, or on edge 2 2 2 1 1   Not being able to stop or control worrying 3 3 3 3 1   QUIN-2 Total Score 5 5 5 4 2     GAD7:   QUIN-7 SCORE 10/18/2021 11/29/2021 1/6/2022 2/6/2022 2/6/2022 3/18/2022   Total Score - 9 (mild anxiety) 14 (moderate anxiety) - 16 (severe anxiety) 13 (moderate anxiety)   Total Score 15 9 14 16 16 13     PROMIS 10-Global Health (only subscores and total score):   PROMIS-10 Scores Only 1/6/2022 4/29/2022 4/29/2022   Global Mental Health Score 6 8 8   Global Physical Health Score 11 13 13   PROMIS TOTAL - SUBSCORES 17 21 21         ASSESSMENT: Current Emotional / Mental Status (status of significant symptoms):   Risk status (Self / Other harm or suicidal ideation)   Patient denies current fears or concerns for personal safety.   Patient denies current or recent suicidal ideation or behaviors.   Patient denies current or recent homicidal ideation or behaviors.   Patient denies current or recent self injurious behavior or ideation.   Patient denies other safety concerns.   Patient reports there has been no change in risk factors since their last session.     Patient reports there has been no change in protective factors since their last session.     Recommended that patient call 911 or go to the local ED should there be a change in any of these risk factors.     Appearance:   unable to assess    Eye Contact:   unable to assess    Psychomotor  Behavior: unable to assess    Attitude:   Cooperative  Friendly Pleasant Attentive   Orientation:   All   Speech    Rate / Production: Normal/ Responsive    Volume:  Normal    Mood:    Depressed  Normal   Affect:    Appropriate    Thought Content:  Clear    Thought Form:  Coherent  Logical  Tangential    Insight:    Fair      Medication Review:   Changes to psychiatric medications, see updated Medication List in EPIC.  New med dosage starts at end of month per pt's report.     Medication Compliance:   Yes     Changes in Health Issues:   Yes: Weight / dietary issues, Associated Psychological Distress     Chemical Use Review:   Substance Use: Chemical use reviewed, no active concerns identified      Tobacco Use: No change in amount of tobacco use since last session.  Patient declined discussion at this time    Diagnosis:  1. Attention deficit hyperactivity disorder, inattentive type, moderate    2. Major depressive disorder, recurrent episode, moderate (H)        Collateral Reports Completed:   Not Applicable    PLAN: (Patient Tasks / Therapist Tasks / Other)  Avoid drinking 2 hours before bed and use bathroom before sleep so reduce disrupted sleep pattern*   Keep small notebook on self (memory aid)   Read out loud to study for exam/test (infrastructure)   Reach out or follow-up with school counselor   Update whiteboard daily   Put mom notebook in a single spot at home in living room    Follow-up:  Keep a consistent sleep schedule, bedtime 10-11 and don't take naps during the day    [facility]    ______________________________________________________________________    Individual Treatment Plan    Patient's Name: Mason Crowe  YOB: 1975    Date of Creation: 11/8/21  Date Treatment Plan Last Reviewed/Revised: 4/7/22    DSM5 Diagnoses: Attention-Deficit/Hyperactivity Disorder  314.01 (F90.2) Combined presentation or 296.32 (F33.1) Major Depressive Disorder, Recurrent Episode, Moderate With  anxious distress  Psychosocial / Contextual Factors: pt's inability to complete tasks and focus to the point she can't work or help her mother, which is likely contributing to her depressive symptoms. Pt is currently undergoing ADHD assessment that still needs to be finished, but pt has not been able to focus on task related to it to complete it.    WHODAS:   WHODAS 2.0 Total Score 10/12/2021   Total Score 45   Total Score MyChart 45   PROMIS (reviewed every 90 days):   PROMIS 10-Global Health (only subscores and total score):   PROMIS-10 Scores Only 1/6/2022 4/29/2022 4/29/2022   Global Mental Health Score 6 8 8   Global Physical Health Score 11 13 13   PROMIS TOTAL - SUBSCORES 17 21 21       Referral / Collaboration:  Referral to another professional/service is not indicated at this time. This will be reviewed/reconsidered on an ongoing basis over the next 90 days to determine if a referral is appropriate for additional services and will be discussed with pt at that time if one is deemed necessary prior to next treatment plan update/review.    Anticipated number of session for this episode of care: 13  Anticipation frequency of session: Weekly  Anticipated Duration of each session: 38-52 minutes (Pt sessions tend to go over time due to complex/maladaptive communication related to MH symptoms, ongoing attempts to redirect to ending by the anticipated time will continue)  Treatment plan will be reviewed in 90 days or when goals have been changed.     MeasurableTreatment Goal(s) related to diagnosis / functional impairment(s)  Goal 1: Patient will improve in task completion rate and organization.    I will know I've met my goal when I am able to get things done in a single day.      Objective #A Use daily planner each day to identify tasks to complete.    Patient will Use clanaders to stay organized and plan things out (at least 50%)  Increase interest, engagement, and pleasure in doing things  Improve concentration,  focus, and mindfulness in daily activities .  Status: Continued - Date(s): 4/7/22     Intervention(s)  Therapist will assign homework between each session related to topic matter  teach strategies related to behavior activitation and habit forming skills/strategies.    Objective #B Listen to music or other simultaneous activity to improve concentration/focus  Patient will identify activites she can do simultaniously to help her stay on task  Increase interest, engagement, and pleasure in doing things  Identify negative self-talk and behaviors: challenge core beliefs, myths, and actions  Feel less fidgety, restless or slow in daily activities / interpersonal interactions.  Status: Continued - Date(s): 4/7/22     Intervention(s)  Therapist will assign homework between session related to topic matter.  Assist in identifying and developing cocurrent behaviors that are conducive to staying on task.    Objective #C Complete follow-through on commitments   Patient will complete her ADHD assessment and paperwork related to ongoing sobriety/education/work related objectives.  Status: Completed - Date: 4/7/22     Intervention(s)  Therapist will assign homework between sessions related to topic matter  make referrals to psychiatry and ADHD   assist in identifying barriers to success.      Goal 2: Patient will improve quality of sleep and reduce daytime sleepiness.    I will know I've met my goal when I can stay awake during at work and get to bed at a consistent time.      Objective #A Use CPAP machine consistently throughout the week.    Status: Continued - Date(s): 4/7/22     Patient will Improve quantity and quality of night time sleep / decrease daytime naps  Feel less tired and more energy during the day .    Intervention(s)  Therapist will assign homework between session related to topic.  provide educational materials on sleep hygiene strategies and the impact of sleep on health.    Objective #B Develop a sleep  routine/habits that better supports a work/life balance  Patient will identify and use habit/routine forming strategies to improve organization  Improve quantity and quality of night time sleep / decrease daytime naps  Feel less tired and more energy during the day   Improve concentration, focus, and mindfulness in daily activities .    Status: Continued - Date(s): 4/7/22     Intervention(s)  Therapist will assign homework between session related to topic matter.  teach the client how to perform a behavioral chain analysis. Developing strategies to promote better sleep routine..      Goal 3: Patient will have fewer days of feeling hopeless or worthless    I will know I've met my goal when I am able to not get stuck in feelings of depression.      Objective #A Better manage feelings of guilt and sadness related to past, and improve self-esteem/worth  Status: Continued - Date(s): 4/7/22     Patient will identify unhelpful thinking, triggers, and stressors which contribute to feelings of depression  identify patterns of escalation  (i.e. tightness in chest, flushed face, increased heart rate, clenched hands, etc.)  Increase interest, engagement, and pleasure in doing things  Decrease frequency and intensity of feeling down, depressed, hopeless  Improve quantity and quality of night time sleep / decrease daytime naps  Identify negative self-talk and behaviors: challenge core beliefs, myths, and actions  Improve concentration, focus, and mindfulness in daily activities   identify coping strategies for improving mood.    Intervention(s)  Therapist will assign homework between session related to topic matter  teach emotional recognition/identification. recongizing triggers and ways to be proactive in coping with them.    Patient has reviewed and agreed to the above plan.      [facility]avez Farmer [facility]  November 8, 2021

## 2022-06-24 ENCOUNTER — VIRTUAL VISIT (OUTPATIENT)
Dept: PSYCHIATRY | Facility: CLINIC | Age: 47
End: 2022-06-24
Attending: NURSE PRACTITIONER
Payer: COMMERCIAL

## 2022-06-24 DIAGNOSIS — F33.0 MILD EPISODE OF RECURRENT MAJOR DEPRESSIVE DISORDER (H): ICD-10-CM

## 2022-06-24 DIAGNOSIS — F90.8 ATTENTION DEFICIT HYPERACTIVITY DISORDER (ADHD), OTHER TYPE: Primary | ICD-10-CM

## 2022-06-24 DIAGNOSIS — F41.9 ANXIETY: ICD-10-CM

## 2022-06-24 PROCEDURE — 99214 OFFICE O/P EST MOD 30 MIN: CPT | Mod: GT | Performed by: NURSE PRACTITIONER

## 2022-06-24 RX ORDER — IBUPROFEN 200 MG
950 CAPSULE ORAL 2 TIMES DAILY
COMMUNITY
Start: 2022-06-22 | End: 2023-08-14

## 2022-06-24 RX ORDER — VENLAFAXINE HYDROCHLORIDE 37.5 MG/1
75 CAPSULE, EXTENDED RELEASE ORAL DAILY
Qty: 60 CAPSULE | Refills: 1 | Status: SHIPPED | OUTPATIENT
Start: 2022-06-24 | End: 2022-08-29

## 2022-06-24 RX ORDER — GABAPENTIN 400 MG/1
800 CAPSULE ORAL 3 TIMES DAILY
Qty: 180 CAPSULE | Refills: 1 | Status: SHIPPED | OUTPATIENT
Start: 2022-06-24 | End: 2022-08-29

## 2022-06-24 RX ORDER — METHYLPHENIDATE HYDROCHLORIDE 54 MG/1
54 TABLET ORAL EVERY MORNING
Qty: 30 TABLET | Refills: 0 | Status: SHIPPED | OUTPATIENT
Start: 2022-06-24 | End: 2022-07-16

## 2022-06-24 RX ORDER — HYDROXYZINE HYDROCHLORIDE 25 MG/1
25-50 TABLET, FILM COATED ORAL EVERY 8 HOURS PRN
Qty: 180 TABLET | Refills: 1 | Status: SHIPPED | OUTPATIENT
Start: 2022-06-24 | End: 2022-08-29

## 2022-06-24 NOTE — PATIENT INSTRUCTIONS
-Increase Concerta to 54 mg daily for ADHD. Monitor anxiety and sleep difficulties.  Please continue to monitor blood pressure at least about 2 times/week.  -Trazodone is discontinued as you are not taking the medication.  -Continue on all other medication regimen.    Your next appointment is scheduled on 7/22/2022 (Fri) at 1:30pm.        **For crisis resources, please see the information at the end of this document**   Patient Education    Thank you for coming to the [facility] [place] [facility].     Lab Testing:  If you had lab testing today and your results are reassuring or normal they will be mailed to you or sent through [facility] within 7 days. If the lab tests need quick action we will call you with the results. The phone number we will call with results is # [phone]. If this is not the best number please call our clinic and change the number.     Medication Refills:  If you need any refills please call your pharmacy and they will contact us. Our fax number for refills is [phone].   Three business days of notice are needed for general medication refill requests.   Five business days of notice are needed for controlled substance refill requests.   If you need to change to a different pharmacy, please contact the new pharmacy directly. The new pharmacy will help you get your medications transferred.     Contact Us:  Please call [phone] during business hours (8-5:00 M-F).   If you have medication related questions after clinic hours, or on the weekend, please call [phone].     Financial Assistance [phone]   Medical Records [phone]       MENTAL HEALTH CRISIS RESOURCES:  For a emergency help, please call 911 or go to the nearest Emergency Department.     Emergency Walk-In Options:   [facility] Unit @ [place] [facility] ([place]): [phone] - Specialized mental health emergency area designed to be calming  [facility] ([place]):  [phone]  [facility] ([place]): [phone]  [facility] ([place]): [phone]    [facility] Crisis Information:   [place]: [phone]  [place]: [phone]  [place] ([facility]) - Adult: [phone]     Child: [phone]  [place] - Adult: [phone]     Child: [phone]  [place]: [phone]  List of all [facility] resources:   https://[place].gov/dhs/people-we-serve/adults/health-care/mental-health/resources/crisis-contacts.jsp    National Crisis Information:   Crisis Text Line: Text  [place]  to 241848  National Suicide Prevention Lifeline: 2-210-247-TALK (1-317.283.6382)       For online chat options, visit https://suicidepreventionlifeline.org/chat/  Poison Control Center: 1-302.271.7923  Trans Lifeline: 5-260-471-0160 - Hotline for transgender people of all ages  The [employer] Project: 4-271-934-9127 - Hotline for LGBT youth     For Non-Emergency Support:   Fast Tracker: Mental Health & Substance Use Disorder Resources -   https://www.CPowern.org/
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
## 2022-06-27 RX ORDER — HYDROXYZINE HYDROCHLORIDE 25 MG/1
TABLET, FILM COATED ORAL
Qty: 180 TABLET | Refills: 1 | OUTPATIENT
Start: 2022-06-27

## 2022-06-27 NOTE — TELEPHONE ENCOUNTER
Dup Last Rx: 6-24-22 for 180t w/1 RF @ [facility], [phone]

## 2022-07-16 ENCOUNTER — MYC REFILL (OUTPATIENT)
Dept: PSYCHIATRY | Facility: CLINIC | Age: 47
End: 2022-07-16

## 2022-07-16 DIAGNOSIS — F90.8 ATTENTION DEFICIT HYPERACTIVITY DISORDER (ADHD), OTHER TYPE: ICD-10-CM

## 2022-07-16 NOTE — TELEPHONE ENCOUNTER
Received RF request from patient     Last seen: 6/24/22  RTC: 1 month  Cancel: none  No-show: none  Next appt: none scheduled     Medication requested: methylphenidate (CONCERTA) 54 MG CR tablet  Directions: Take 1 tablet (54 mg) by mouth every morning   Qty: 30  Last Rx written 6/24/22  [place]  checked and last refilled on 7/1 (54 mg), 5/23 (36 mg), 4/19 (27 mg)         Plan per 6/24 virtual visit:    -Increase Concerta to 54 mg daily for ADHD when current refill runs out.  Monitor your blood pressure at least 3 times/week.  If this is consistently in 140/90's, please contact the clinic.  -Trazodone is discontinued as you are not taking the medication.  -Continue all other medication regimen.       Pended 30 ds with start date of 7/29 and routed to ERNIE Barcenas, to sign off on for controlled substances.

## 2022-07-18 RX ORDER — METHYLPHENIDATE HYDROCHLORIDE 54 MG/1
54 TABLET ORAL EVERY MORNING
Qty: 30 TABLET | Refills: 0 | Status: SHIPPED | OUTPATIENT
Start: 2022-07-29 | End: 2022-08-29

## 2022-08-29 DIAGNOSIS — F41.9 ANXIETY: ICD-10-CM

## 2022-08-29 DIAGNOSIS — F33.0 MILD EPISODE OF RECURRENT MAJOR DEPRESSIVE DISORDER (H): ICD-10-CM

## 2022-08-29 DIAGNOSIS — F90.8 ATTENTION DEFICIT HYPERACTIVITY DISORDER (ADHD), OTHER TYPE: ICD-10-CM

## 2022-08-29 RX ORDER — METHYLPHENIDATE HYDROCHLORIDE 54 MG/1
54 TABLET ORAL EVERY MORNING
Qty: 30 TABLET | Refills: 0 | Status: SHIPPED | OUTPATIENT
Start: 2022-08-30 | End: 2022-09-27

## 2022-08-29 RX ORDER — VENLAFAXINE HYDROCHLORIDE 37.5 MG/1
75 CAPSULE, EXTENDED RELEASE ORAL DAILY
Qty: 60 CAPSULE | Refills: 1 | Status: SHIPPED | OUTPATIENT
Start: 2022-08-29 | End: 2022-09-06

## 2022-08-29 RX ORDER — GABAPENTIN 400 MG/1
800 CAPSULE ORAL 3 TIMES DAILY
Qty: 180 CAPSULE | Refills: 1 | Status: SHIPPED | OUTPATIENT
Start: 2022-08-29 | End: 2022-09-27

## 2022-08-29 RX ORDER — HYDROXYZINE HYDROCHLORIDE 25 MG/1
25-50 TABLET, FILM COATED ORAL EVERY 8 HOURS PRN
Qty: 180 TABLET | Refills: 1 | Status: SHIPPED | OUTPATIENT
Start: 2022-08-29 | End: 2022-09-27

## 2022-08-29 NOTE — TELEPHONE ENCOUNTER
Last seen: 06/24/22  RTC: 1 month after increasing Concerta to 36 mg daily  Cancel: 7/22/22  No-show: none  Next appt: 09/27/22     Incoming refill from Patient via [employer]    Medication requested:   Pending Prescriptions:                       Disp   Refills    venlafaxine (EFFEXOR XR) 37.5 MG 24 hr ca*60 cap*1            Sig: Take 2 capsules (75 mg) by mouth daily    methylphenidate (CONCERTA) 54 MG CR cabcsx06 tab*0            Sig: Take 1 tablet (54 mg) by mouth every morning    gabapentin (NEURONTIN) 400 MG capsule     180 ca*1            Sig: Take 2 capsules (800 mg) by mouth 3 times daily    hydrOXYzine (ATARAX) 25 MG tablet         180 ta*1            Sig: Take 1-2 tablets (25-50 mg) by mouth every 8           hours as needed for anxiety        Last refill per       From chart note:        Medication refill approved per refill protocol.     Medication sent to provider for review.

## 2022-08-30 RX ORDER — VENLAFAXINE HYDROCHLORIDE 37.5 MG/1
CAPSULE, EXTENDED RELEASE ORAL
Qty: 60 CAPSULE | Refills: 1 | OUTPATIENT
Start: 2022-08-30

## 2022-09-06 ENCOUNTER — TELEPHONE (OUTPATIENT)
Dept: PSYCHIATRY | Facility: CLINIC | Age: 47
End: 2022-09-06

## 2022-09-06 DIAGNOSIS — F33.0 MILD EPISODE OF RECURRENT MAJOR DEPRESSIVE DISORDER (H): ICD-10-CM

## 2022-09-06 DIAGNOSIS — F41.9 ANXIETY: ICD-10-CM

## 2022-09-06 RX ORDER — VENLAFAXINE HYDROCHLORIDE 75 MG/1
75 CAPSULE, EXTENDED RELEASE ORAL DAILY
Qty: 30 CAPSULE | Refills: 1 | Status: SHIPPED | OUTPATIENT
Start: 2022-09-06 | End: 2022-09-27

## 2022-09-06 NOTE — TELEPHONE ENCOUNTER
[facility] Call Center    Phone Message    May a detailed message be left on voicemail: yes     Reason for Call: Medication Question or concern regarding medication   Prescription Clarification  Name of Medication: Venlafaxine  Prescribing Provider: Samira   Pharmacy:      [facility] [address] [place] [address].     What on the order needs clarification?     Insurance won't cover two tabs of 37.5mg.  Pharmacy was hoping to switch med refill to 75 mg           Action Taken: Other: Nursing pool    Travel Screening: Not Applicable

## 2022-09-18 ENCOUNTER — HEALTH MAINTENANCE LETTER (OUTPATIENT)
Age: 47
End: 2022-09-18

## 2022-09-27 ENCOUNTER — VIRTUAL VISIT (OUTPATIENT)
Dept: PSYCHIATRY | Facility: CLINIC | Age: 47
End: 2022-09-27
Attending: NURSE PRACTITIONER
Payer: COMMERCIAL

## 2022-09-27 DIAGNOSIS — F90.8 ATTENTION DEFICIT HYPERACTIVITY DISORDER (ADHD), OTHER TYPE: ICD-10-CM

## 2022-09-27 DIAGNOSIS — F33.0 MILD EPISODE OF RECURRENT MAJOR DEPRESSIVE DISORDER (H): ICD-10-CM

## 2022-09-27 DIAGNOSIS — F41.9 ANXIETY: Primary | ICD-10-CM

## 2022-09-27 PROCEDURE — 99214 OFFICE O/P EST MOD 30 MIN: CPT | Mod: GT | Performed by: NURSE PRACTITIONER

## 2022-09-27 RX ORDER — METHYLPHENIDATE HYDROCHLORIDE 54 MG/1
54 TABLET ORAL DAILY
Qty: 30 TABLET | Refills: 0 | Status: SHIPPED | OUTPATIENT
Start: 2022-09-27 | End: 2022-10-27

## 2022-09-27 RX ORDER — VENLAFAXINE HYDROCHLORIDE 75 MG/1
75 CAPSULE, EXTENDED RELEASE ORAL DAILY
Qty: 90 CAPSULE | Refills: 1 | Status: SHIPPED | OUTPATIENT
Start: 2022-09-27 | End: 2023-02-16

## 2022-09-27 RX ORDER — METHYLPHENIDATE HYDROCHLORIDE 54 MG/1
54 TABLET ORAL DAILY
Qty: 30 TABLET | Refills: 0 | Status: SHIPPED | OUTPATIENT
Start: 2022-10-28 | End: 2022-11-27

## 2022-09-27 RX ORDER — METHYLPHENIDATE HYDROCHLORIDE 54 MG/1
54 TABLET ORAL DAILY
Qty: 30 TABLET | Refills: 0 | Status: SHIPPED | OUTPATIENT
Start: 2022-11-28 | End: 2022-12-28

## 2022-09-27 RX ORDER — GABAPENTIN 400 MG/1
800 CAPSULE ORAL 3 TIMES DAILY
Qty: 180 CAPSULE | Refills: 5 | Status: SHIPPED | OUTPATIENT
Start: 2022-09-27 | End: 2023-03-16

## 2022-09-27 RX ORDER — HYDROXYZINE HYDROCHLORIDE 25 MG/1
25-50 TABLET, FILM COATED ORAL EVERY 8 HOURS PRN
Qty: 180 TABLET | Refills: 2 | Status: SHIPPED | OUTPATIENT
Start: 2022-09-27 | End: 2023-01-20

## 2022-09-27 ASSESSMENT — ANXIETY QUESTIONNAIRES
2. NOT BEING ABLE TO STOP OR CONTROL WORRYING: NEARLY EVERY DAY
7. FEELING AFRAID AS IF SOMETHING AWFUL MIGHT HAPPEN: NOT AT ALL
GAD7 TOTAL SCORE: 16
GAD7 TOTAL SCORE: 16
IF YOU CHECKED OFF ANY PROBLEMS ON THIS QUESTIONNAIRE, HOW DIFFICULT HAVE THESE PROBLEMS MADE IT FOR YOU TO DO YOUR WORK, TAKE CARE OF THINGS AT HOME, OR GET ALONG WITH OTHER PEOPLE: VERY DIFFICULT
GAD7 TOTAL SCORE: 16
5. BEING SO RESTLESS THAT IT IS HARD TO SIT STILL: MORE THAN HALF THE DAYS
7. FEELING AFRAID AS IF SOMETHING AWFUL MIGHT HAPPEN: NOT AT ALL
3. WORRYING TOO MUCH ABOUT DIFFERENT THINGS: NEARLY EVERY DAY
6. BECOMING EASILY ANNOYED OR IRRITABLE: NEARLY EVERY DAY
1. FEELING NERVOUS, ANXIOUS, OR ON EDGE: NEARLY EVERY DAY
4. TROUBLE RELAXING: MORE THAN HALF THE DAYS
8. IF YOU CHECKED OFF ANY PROBLEMS, HOW DIFFICULT HAVE THESE MADE IT FOR YOU TO DO YOUR WORK, TAKE CARE OF THINGS AT HOME, OR GET ALONG WITH OTHER PEOPLE?: VERY DIFFICULT

## 2022-09-27 ASSESSMENT — PATIENT HEALTH QUESTIONNAIRE - PHQ9
SUM OF ALL RESPONSES TO PHQ QUESTIONS 1-9: 15
10. IF YOU CHECKED OFF ANY PROBLEMS, HOW DIFFICULT HAVE THESE PROBLEMS MADE IT FOR YOU TO DO YOUR WORK, TAKE CARE OF THINGS AT HOME, OR GET ALONG WITH OTHER PEOPLE: EXTREMELY DIFFICULT
SUM OF ALL RESPONSES TO PHQ QUESTIONS 1-9: 15
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
## 2022-09-28 NOTE — PATIENT INSTRUCTIONS
-Continue on current medication regimen.    Your next appointment is scheduled on 3/28/2023 (Tue) at noon.    Thank you for coming to the [facility] MENTAL HEALTH & ADDICTION [place] [facility].    Lab Testing:  If you had lab testing today and your results are reassuring or normal they will be mailed to you or sent through [employer] within 7 days. If the lab tests need quick action we will call you with the results. The phone number we will call with results is # [phone] (home) . If this is not the best number please call our clinic and change the number.    Medication Refills:  If you need any refills please call your pharmacy and they will contact us. Our fax number for refills is [phone]. Please allow three business for refill processing. If you need to  your refill at a new pharmacy, please contact the new pharmacy directly. The new pharmacy will help you get your medications transferred.     Scheduling:  If you have any concerns about today's visit or wish to schedule another appointment please call our office during normal business hours [phone] (8-5:00 M-F)    Contact Us:  Please call [phone] during business hours (8-5:00 M-F).  If after clinic hours, or on the weekend, please call  [phone].    Financial Assistance [phone]  [facility] Billing [phone]  Central Billing Office, [facility]: [phone]  [place] Billing [phone]  Medical Records [phone]      MENTAL HEALTH CRISIS NUMBERS:  For a medical emergency please call  601 or go to the nearest ER.     [facility]:   [facility] -[phone]   Crisis Residence [facility] -[phone]   Walk-In Counseling [facility] -[phone]   COPE 24/7 [facility] Mobile Team -[phone] (adults)/[phone] (child)  CHILD: [facility] needs assessment team - [phone]      [facility]:   [facility] - [phone]   Walk-in counseling [facility]  [phone]   Walk-in counseling [facility] - [phone]   Crisis Residence [facility] - [phone]  Urgent Care Adult Mental Gjvtqc-[phone] mobile unit/ 24/7 crisis line    National Crisis Numbers:   National Suicide Prevention Lifeline: 2-274-770-TALK (483-262-3791)  Poison Control Center - 1-305-704-6880  [employer]/resources for a list of additional resources (SOS)  Trans Lifeline a hotline for transgender people 7-662-941-8272  [employer] a hotline for LGBT youth 1-416-694-2003  Crisis Text Line: For any crisis 24/7   To: 959627  see www.crisistextline.org  - IF MAKING A CALL FEELS TOO HARD, send a text!         Again thank you for choosing [facility] MENTAL HEALTH & ADDICTION [place] [facility] and please let us know how we can best partner with you to improve you and your family's health.    You may be receiving a survey regarding this appointment. We would love to have your feedback, both positive and negative. The survey is done by an external company, so your answers are anonymous.

## 2022-12-30 DIAGNOSIS — F90.8 ATTENTION DEFICIT HYPERACTIVITY DISORDER (ADHD), OTHER TYPE: Primary | ICD-10-CM

## 2022-12-30 RX ORDER — METHYLPHENIDATE HYDROCHLORIDE 54 MG/1
54 TABLET ORAL DAILY
Qty: 30 TABLET | Refills: 0 | Status: SHIPPED | OUTPATIENT
Start: 2022-12-30 | End: 2023-01-18

## 2022-12-30 NOTE — TELEPHONE ENCOUNTER
"Writer attempted to call patient to follow up on reported poor concentration and to inquire about whether patient would like a sooner appointment with provider. LVM requesting a call back at the main clinic number.     Patient returned writer's call. Patient reports continued poor concentration, but states she is unsure when this started. When asked if the 54 mg was ever effective, patient states she does not feel like it was and has been \"waiting to see if the medication could work.\" Patient states she is willing to make a sooner appointment if needed to discuss with provider if needed.     Patient reports she has two days remaining of her Concerta.    Last seen: 09/27/2022  RTC: 6 month per pt's request  Cancel: None  No-show: None  Next appt: 04/11/2023     Incoming refill from Patient via phone    Medication requested:   Pending Prescriptions:                       Disp   Refills    methylphenidate (CONCERTA) 54 MG CR [id number] tab*0            Sig: Take 1 tablet (54 mg) by mouth daily    Last refill per       From chart note:   -Continue on current medication regimen.  -Increase Concerta to 54 mg daily for ADHD when current refill runs out.  Monitor your blood pressure at least 3 times/week.  If this is consistently in 140/90's, please contact the clinic.    Medication sent to provider for review.        "

## 2022-12-30 NOTE — TELEPHONE ENCOUNTER
Writer called patient to advise of provider request for patient to schedule a sooner appointment. Patient made appointment for 1/18/2023 at 2:30.

## 2022-12-30 NOTE — TELEPHONE ENCOUNTER
[facility] Call Center    Phone Message    May a detailed message be left on voicemail: yes     Reason for Call: Medication Refill Request    Has the patient contacted the pharmacy for the refill? Yes   Name of medication being requested: Concerta 54MG Provider who prescribed the medication: Dr. Hogan  Pharmacy: [facility] [address] [place] [address] SO.  Date medication is needed: Has a few days left of the medication.     The patient also asked writer to note that they would like to get a message to Dr. Hogan that they are still having trouble concentrating with the medication and it may need to be adjusted in the future. Patient would like someone to reach out them if possible.       Action Taken: Other: Nurses.     Travel Screening: Not Applicable

## 2023-01-18 ENCOUNTER — VIRTUAL VISIT (OUTPATIENT)
Dept: PSYCHIATRY | Facility: CLINIC | Age: 48
End: 2023-01-18
Attending: NURSE PRACTITIONER
Payer: COMMERCIAL

## 2023-01-18 DIAGNOSIS — F90.8 ATTENTION DEFICIT HYPERACTIVITY DISORDER (ADHD), OTHER TYPE: Primary | ICD-10-CM

## 2023-01-18 PROCEDURE — 99214 OFFICE O/P EST MOD 30 MIN: CPT | Mod: GT | Performed by: NURSE PRACTITIONER

## 2023-01-18 RX ORDER — LISDEXAMFETAMINE DIMESYLATE 40 MG/1
40 CAPSULE ORAL EVERY MORNING
Qty: 30 CAPSULE | Refills: 0 | Status: SHIPPED | OUTPATIENT
Start: 2023-01-18 | End: 2023-03-16 | Stop reason: DRUGHIGH

## 2023-01-18 RX ORDER — TOPIRAMATE 25 MG/1
25 TABLET, FILM COATED ORAL 2 TIMES DAILY
COMMUNITY
Start: 2022-10-29

## 2023-01-18 ASSESSMENT — PATIENT HEALTH QUESTIONNAIRE - PHQ9
SUM OF ALL RESPONSES TO PHQ QUESTIONS 1-9: 8
SUM OF ALL RESPONSES TO PHQ QUESTIONS 1-9: 8
10. IF YOU CHECKED OFF ANY PROBLEMS, HOW DIFFICULT HAVE THESE PROBLEMS MADE IT FOR YOU TO DO YOUR WORK, TAKE CARE OF THINGS AT HOME, OR GET ALONG WITH OTHER PEOPLE: SOMEWHAT DIFFICULT
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
## 2023-01-18 NOTE — PATIENT INSTRUCTIONS
-Discontinue Concerta.  -Start Vyvance 40 mg daily for ADHD. Monitor for anxiety, sleep difficulties and irritability.  -Continue all other medication regimen.    Your next appointment is scheduled on 2/16/2023 (Thu) at 2:30pm.      **For crisis resources, please see the information at the end of this document**   Patient Education    Thank you for coming to the [facility] & [facility]DI[facility] [place] [facility].     Lab Testing:  If you had lab testing today and your results are reassuring or normal they will be mailed to you or sent through Share[employer] within 7 days. If the lab tests need quick action we will call you with the results. The phone number we will call with results is # [phone]. If this is not the best number please call our clinic and change the number.     Medication Refills:  If you need any refills please call your pharmacy and they will contact us. Our fax number for refills is [phone].   Three business days of notice are needed for general medication refill requests.   Five business days of notice are needed for controlled substance refill requests.   If you need to change to a different pharmacy, please contact the new pharmacy directly. The new pharmacy will help you get your medications transferred.     Contact Us:  Please call [phone] during business hours (8-5:00 M-F).   If you have medication related questions after clinic hours, or on the weekend, please call [phone].     Financial Assistance [phone]   Medical Records [phone]       MENTAL HEALTH CRISIS RESOURCES:  For a emergency help, please call 911 or go to the nearest Emergency Department.     Emergency Walk-In Options:   Em[facility] Unit @ [facility] ([place]): [phone] - Specialized mental health emergency area designed to be calming  [facility] ([place]): [phone]  [facility] ([place]): [phone]  [facility] ([place]):  [phone]    [facility] Crisis Information:   [place]: [phone]  [place]: [phone]  [place] ([place]) - Adult: [phone]     Child: [phone]  [place] - Adult: [phone]     Child: [phone]  [place]: [phone]  List of all [facility] resources:   https://[place].gov/dhs/people-we-serve/adults/health-care/mental-health/resources/crisis-contacts.jsp    National Crisis Information:   Crisis Text Line: Text  [place]  to 291138  Suicide & Crisis Lifeline: 988  National Suicide Prevention Lifeline: 2-807-747-TALK (1-214.797.9682)       For online chat options, visit https://suicidepreventionlifeline.org/chat/  Poison Control Center: 1-166.571.4021  Trans Lifeline: 1-909.330.4471 - Hotline for transgender people of all ages  The [employer] Project: 2-113-198-9136 - Hotline for LGBT youth     For Non-Emergency Support:   Fast Tracker: Mental Health & Substance Use Disorder Resources -   https://www.Wowcracytrack[place]exSteppen.org/

## 2023-01-18 NOTE — PROGRESS NOTES
Mason Crowe is a 47 year old who is being evaluated via a billable video visit.      Pt will join video visit via: WePopp  If there are problems joining the visit, send backup video invite via: Text to preferred phone: [phone]    Reason for telehealth visit: Patient has requested telehealth visit    Originating location (patient location): Patient's home    Will anyone else be joining the visit? No

## 2023-01-19 DIAGNOSIS — F41.9 ANXIETY: ICD-10-CM

## 2023-01-20 RX ORDER — HYDROXYZINE HYDROCHLORIDE 25 MG/1
TABLET, FILM COATED ORAL
Qty: 180 TABLET | Refills: 0 | Status: SHIPPED | OUTPATIENT
Start: 2023-01-20 | End: 2023-02-16

## 2023-01-20 NOTE — TELEPHONE ENCOUNTER
Medication requested: HYDROXYZINE HCL 25MG TABS (WHITE)  Last refilled: 9/27/22  Qty: 180/2  Last seen: 1/18/23  RTC: 4weeks  Cancel: 0  No-show: 0  Next appt: 2/16/23    Refill decision:    Refilled for 30 days per protocol.

## 2023-02-02 ENCOUNTER — TELEPHONE (OUTPATIENT)
Dept: PSYCHIATRY | Facility: CLINIC | Age: 48
End: 2023-02-02
Payer: COMMERCIAL

## 2023-02-02 NOTE — TELEPHONE ENCOUNTER
Writer asked to check in on medication change to Vyvanse. Attempted to call patient.  No answer. Left voicemail to call back clinic at [phone].

## 2023-02-06 NOTE — TELEPHONE ENCOUNTER
Patient called back and states that she has been taking the medication for 2 weeks. She said that she doesn't think that she feels more anxious and thinks that mentally things are going ok. She feels like she is oversleeping and unable to stay up as late as she would like to study. She has a lower appetite but feels like this is beneficial for her as she is overweight. Her biggest issue is that she is feeling itchy. She states that the itching is uncontrollable. No rash. She states that she gets itching due to her poor liver function.  She has been showering and using lotion and drinking plenty of water but it does not seem to help. These things normally help. This has been increased for the last few days. She also states that she has been using more tylenol and ibuprofen and wonders if that is the cause. She is planning to reach out to her GI doc to discuss.     Patient is unsure if she wants to remain on this or switch back to the Concerta and will wait to see what provider says.

## 2023-02-15 DIAGNOSIS — F41.9 ANXIETY: ICD-10-CM

## 2023-02-15 RX ORDER — HYDROXYZINE HYDROCHLORIDE 25 MG/1
TABLET, FILM COATED ORAL
Qty: 180 TABLET | Refills: 0 | OUTPATIENT
Start: 2023-02-15

## 2023-02-16 ENCOUNTER — VIRTUAL VISIT (OUTPATIENT)
Dept: PSYCHIATRY | Facility: CLINIC | Age: 48
End: 2023-02-16
Attending: NURSE PRACTITIONER
Payer: COMMERCIAL

## 2023-02-16 DIAGNOSIS — F41.9 ANXIETY: ICD-10-CM

## 2023-02-16 DIAGNOSIS — F33.0 MILD EPISODE OF RECURRENT MAJOR DEPRESSIVE DISORDER (H): ICD-10-CM

## 2023-02-16 DIAGNOSIS — F90.8 ATTENTION DEFICIT HYPERACTIVITY DISORDER (ADHD), OTHER TYPE: Primary | ICD-10-CM

## 2023-02-16 PROCEDURE — 99214 OFFICE O/P EST MOD 30 MIN: CPT | Mod: VID | Performed by: NURSE PRACTITIONER

## 2023-02-16 PROCEDURE — G0463 HOSPITAL OUTPT CLINIC VISIT: HCPCS | Mod: PN,GT | Performed by: NURSE PRACTITIONER

## 2023-02-16 RX ORDER — LISDEXAMFETAMINE DIMESYLATE 50 MG/1
50 CAPSULE ORAL EVERY MORNING
Qty: 30 CAPSULE | Refills: 0 | Status: SHIPPED | OUTPATIENT
Start: 2023-02-16 | End: 2023-03-16 | Stop reason: DRUGHIGH

## 2023-02-16 RX ORDER — VENLAFAXINE HYDROCHLORIDE 75 MG/1
75 CAPSULE, EXTENDED RELEASE ORAL DAILY
Qty: 90 CAPSULE | Refills: 1 | Status: SHIPPED | OUTPATIENT
Start: 2023-02-16 | End: 2023-09-17

## 2023-02-16 RX ORDER — HYDROXYZINE HYDROCHLORIDE 25 MG/1
TABLET, FILM COATED ORAL
Qty: 180 TABLET | Refills: 0 | Status: SHIPPED | OUTPATIENT
Start: 2023-02-16 | End: 2023-05-19
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
## 2023-02-16 NOTE — PATIENT INSTRUCTIONS
-Increase Vyvance to 50 mg daily for ADHD. Please continue to monitor blood pressure 2 times a week.  -Continue all other medication regimen.    -Restart CPAP use.  -Follow up with bariatric, GI providers.    Your next appointment is scheduled on 3/16/2023 (Thu) at 2pm.        **For crisis resources, please see the information at the end of this document**   Patient Education    Thank you for coming to the [facility] MENTAL HEALTH & ADDICTION [place] [facility].     Lab Testing:  If you had lab testing today and your results are reassuring or normal they will be mailed to you or sent through [employer] within 7 days. If the lab tests need quick action we will call you with the results. The phone number we will call with results is # [phone]. If this is not the best number please call our clinic and change the number.     Medication Refills:  If you need any refills please call your pharmacy and they will contact us. Our fax number for refills is [phone].   Three business days of notice are needed for general medication refill requests.   Five business days of notice are needed for controlled substance refill requests.   If you need to change to a different pharmacy, please contact the new pharmacy directly. The new pharmacy will help you get your medications transferred.     Contact Us:  Please call [phone] during business hours (8-5:00 M-F).   If you have medication related questions after clinic hours, or on the weekend, please call [phone].     Financial Assistance [phone]   Medical Records [phone]       MENTAL HEALTH CRISIS RESOURCES:  For a emergency help, please call 911 or go to the nearest Emergency Department.     Emergency Walk-In Options:   EmPATH Unit @ [place] [facility] ([place]): [phone] - Specialized mental health emergency area designed to be calming  [facility] ([place]): [phone]  [facility] Acute Psychiat[facility] Services ([place]):  [phone]  [facility] ([place]): [phone]    [facility] Crisis Information:   [place]: [phone]  [place]: [phone]  [place] RAMIREZ) - Adult: [phone]     Child: [phone]  [place] - Adult: [phone]     Child: [phone]  [place]: [phone]  List of all [facility] resources:   [email or web address]/dhs/people-[email or web address]-serve/adults/health-care/mental-health/resources/crisis-contacts.jsp    National Crisis Information:   Crisis Text Line: Text  [place]  to 881804  Suicide & Crisis Lifeline: 988  National Suicide Prevention Lifeline: 8-074-796-TALK (1-928.441.1351)       For online chat options, visit https://suicidepreventionlifeline.org/chat/  Poison Control Center: 1-613.879.2647  Trans Lifeline: 9-737-435-9658 - Hotline for transgender people of all ages  The Minh Project: 6-773-548-8628 - Hotline for LGBT youth     For Non-Emergency Support:   Fast Tracker: Mental Health & Substance Use Disorder Resources -   https://www.[employer].org/

## 2023-02-16 NOTE — NURSING NOTE
Is the patient currently in the state of [place]? YES    Visit mode:VIDEO    If the visit is dropped, the patient can be reconnected by: VIDEO VISIT: Text to cell phone: [phone]    Will anyone else be joining the visit? NO    How would you like to obtain your AVS? MyChart    Are changes needed to the allergy or medication list? NO    Comments or concerns regarding today's visit: Routine f/u

## 2023-03-16 ENCOUNTER — VIRTUAL VISIT (OUTPATIENT)
Dept: PSYCHIATRY | Facility: CLINIC | Age: 48
End: 2023-03-16
Attending: NURSE PRACTITIONER
Payer: COMMERCIAL

## 2023-03-16 DIAGNOSIS — F90.8 ATTENTION DEFICIT HYPERACTIVITY DISORDER (ADHD), OTHER TYPE: Primary | ICD-10-CM

## 2023-03-16 DIAGNOSIS — F33.0 MILD EPISODE OF RECURRENT MAJOR DEPRESSIVE DISORDER (H): ICD-10-CM

## 2023-03-16 PROCEDURE — 99214 OFFICE O/P EST MOD 30 MIN: CPT | Mod: VID | Performed by: NURSE PRACTITIONER

## 2023-03-16 RX ORDER — GABAPENTIN 400 MG/1
800 CAPSULE ORAL 3 TIMES DAILY
Qty: 180 CAPSULE | Refills: 5 | Status: SHIPPED | OUTPATIENT
Start: 2023-03-16 | End: 2023-08-23

## 2023-03-16 RX ORDER — LISDEXAMFETAMINE DIMESYLATE 60 MG/1
60 CAPSULE ORAL EVERY MORNING
Qty: 30 CAPSULE | Refills: 0 | Status: SHIPPED | OUTPATIENT
Start: 2023-03-16 | End: 2023-04-19

## 2023-03-16 NOTE — PATIENT INSTRUCTIONS
-Increase Vyvance to 60 mg daily for ADHD.Please continue to monitor blood pressure 2 times a week.  -Continue all other medication regimen.    Group therapy for ADHD [email or web address]-[email or web address]    oMnica Garcia, [facility] [email or web address] (she is the one who manages the group)      Your next appointment is scheduled on 4/13/2023 (Thu) at 2pm.    Thank you for coming to the [facility] MENTAL [facility] & ADDICTION [place] [facility].    Lab Testing:  If you had lab testing today and your results are reassuring or normal they will be mailed to you or sent through zoomsquare within 7 days. If the lab tests need quick action we will call you with the results. The phone number we will call with results is # [phone] (home) . If this is not the best number please call our clinic and change the number.    Medication Refills:  If you need any refills please call your pharmacy and they will contact us. Our fax number for refills is [phone]. Please allow three business for refill processing. If you need to  your refill at a new pharmacy, please contact the new pharmacy directly. The new pharmacy will help you get your medications transferred.     Scheduling:  If you have any concerns about today's visit or wish to schedule another appointment please call our office during normal business hours [phone] (8-5:00 M-F)    Contact Us:  Please call [phone] during business hours (8-5:00 M-F).  If after clinic hours, or on the weekend, please call  [phone].    Financial Assistance [phone]  [facility] Billing [phone]  [place] Billing Office, [facility]: [phone]  [place] Billing [phone]  Medical Records [phone]      MENTAL HEALTH CRISIS NUMBERS:  For a medical emergency please call  911 or go to the nearest ER.     [place]  County:   [facility] -[phone]   Crisis Residence \[facility]\"" [facility] -[phone]   Walk-In Counseling Center \[facility]\"" -[phone]   COPE 24/7 [place] Mobile Team -[phone] (adults)/[phone] (child)  CHILD: [facility] needs assessment team - [phone]      [facility]:   [facility] - [phone]   Walk-in counseling [facility] - [phone]   Walk-in counseling [facility] - [phone]   Crisis Residence [facility] Res[facility] - [phone]  Urgent Care Adult Mental Jrzjqg-[phone] mobile unit/ 24/7 crisis line    National Crisis Numbers:   National Suicide Prevention Lifeline: 7-488-249-TALK (520-338-0954)  Poison Control Center - 1-219.534.8382  [employer]/resources for a list of additional resources (SOS)  Trans Lifeline a hotline for transgender people 2-346-192-6192  The Minh Project a hotline for LGBT youth 1-894.367.8888  Crisis Text Line: For any crisis 24/7   To: 031924  see www.crisistextline.org  - IF MAKING A CALL FEELS TOO HARD, send a text!         Again thank you for choosing [facility] MENTAL HEALTH & ADDICTION [place] [facility] and please let us know how we can best partner with you to improve you and your family's health.    You may be receiving a survey regarding this appointment. We would love to have your feedback, both positive and negative. The survey is done by an external company, so your answers are anonymous.
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
## 2023-04-07 ENCOUNTER — TELEPHONE (OUTPATIENT)
Dept: PSYCHIATRY | Facility: CLINIC | Age: 48
End: 2023-04-07
Payer: COMMERCIAL

## 2023-04-07 ENCOUNTER — PATIENT OUTREACH (OUTPATIENT)
Dept: CARE COORDINATION | Facility: CLINIC | Age: 48
End: 2023-04-07
Payer: COMMERCIAL

## 2023-04-07 DIAGNOSIS — F41.9 ANXIETY: Primary | ICD-10-CM

## 2023-04-07 RX ORDER — GABAPENTIN 100 MG/1
100 CAPSULE ORAL 3 TIMES DAILY
Qty: 90 CAPSULE | Refills: 1 | Status: SHIPPED | OUTPATIENT
Start: 2023-04-07 | End: 2023-05-25

## 2023-04-07 RX ORDER — ALPRAZOLAM 0.5 MG
0.5 TABLET ORAL DAILY PRN
Qty: 5 TABLET | Refills: 0 | Status: SHIPPED | OUTPATIENT
Start: 2023-04-07 | End: 2023-04-19

## 2023-04-07 NOTE — TELEPHONE ENCOUNTER
Patient called to states that she is under a lot of stress and feels like she is about to have a mental break. Anxiety and panic attacks have been increased about a week a week or two. She is about to be homeless and feels like things are unraveling. She said that she feels like she is shaking and angry, she relates it to how she would feel when she was in withdrawal - but no substance issues, no drinking.   She will not have any place to stay - maybe with a friend in [place] but doesn't feel like this is a good option. Patient is receptive to having social work reach out. Discussed option of [facility] and patient is not super interested in going to the hospital but willing to look at the information about it and see if it may be a better fit for her. She states that she has a hard time being closed in. Patient was receptive to [employer] to try in the moment also. Information on both sent via [employer] as well as some crisis numbers for the weekend.   Patient using all meds appropriately at this time but hoping to get something to help for the short term. Message sent to provider and will update patient.  SW will reach out to patient today also.

## 2023-04-07 NOTE — PROGRESS NOTES
Clinic Care Coordination Contact    [facility] contacted patient to follow up on a referral sent by nursing. Patient states that she is at risk of losing her apartment. Patient states she has contacted [employer] and has looked into options for transitional housing. Patient is behind on her rent and feels like her current apartment is too expensive for her. Patient was approved for a Section 8 voucher for up to $1,300/month. Patient was encouraged to contact [facility] to check on availability as they accept Section 8. Patient plans to call them on Monday.     Care Coordinator will follow up with patient in 1-2 business days.    TRAM Lancaster  Clinic Care Coordination  [facility]  [email or web address]  [phone]

## 2023-04-07 NOTE — PROGRESS NOTES
See Nursing phone note from today. Called and discussed possible medication change until 4/11 next appt due to severe anxiety exacerbation in context of possibly losing housing. Pt noted she tried Xanax while her father passed away. Did not have sedation nor feeling of relapse.  Will increase Gabapentin to 900 mg TID and also will start Xanax 0.5 mg daily PRN with 5 tabs. Abbey Hogan, CNP, 4/7/2023

## 2023-04-07 NOTE — TELEPHONE ENCOUNTER
Checked back in with patient to see if she felt like she had all the resources she needed for the weekend and she does. Writer will check back in on Monday.

## 2023-04-11 ENCOUNTER — PATIENT OUTREACH (OUTPATIENT)
Dept: CARE COORDINATION | Facility: CLINIC | Age: 48
End: 2023-04-11
Payer: COMMERCIAL

## 2023-04-11 ENCOUNTER — TELEPHONE (OUTPATIENT)
Dept: PSYCHIATRY | Facility: CLINIC | Age: 48
End: 2023-04-11
Payer: COMMERCIAL

## 2023-04-11 NOTE — LETTER
[facility] CARE COORDINATION  [facility]  April 25, 2023    Mason Crowe  [address]  [place] [address]      Dear Mason,    I am a  clinic care coordinator who works with ERNIE Barcenas CNP with the [facility]. I recently tried to call and was unable to reach you. Below is a description of clinic care coordination and how I can further assist you.       The clinic care coordination team is made up of a registered nurse, , and financial resource worker who understand the health care system. The goal of clinic care coordination is to help you manage your health and improve access to the health care system. Our team works alongside your provider to assist you in determining your health and social needs. We can help you obtain health care and community resources, providing you with necessary information and education. We can work with you through any barriers and develop a care plan that helps coordinate and strengthen the communication between you and your care team.  Our services are voluntary and are offered without charge to you personally.    Please feel free to contact me with any questions or concerns regarding care coordination and what we can offer.      We are focused on providing you with the highest-quality healthcare experience possible.    Sincerely,     TRAM Lancaster  Clinic Care Coordination  [facility]  [email or web address]  [phone]

## 2023-04-11 NOTE — TELEPHONE ENCOUNTER
Pt did not respond to [employer] invites. Called and LVM at 1205. Pt was no show at 1215. Abbey Hogan, CNP, 4/11/2023

## 2023-04-11 NOTE — PROGRESS NOTES
Clinic Care Coordination Contact  [facility]/Voicemail       Clinical Data: Care Coordinator Outreach  Outreach attempted x 1.  Left message on patient's voicemail with call back information and requested return call.  Plan: Care Coordinator will try to reach patient again in 10 business days.    Jesika Mills [facility]  Clinic Care Coordination  [facility]  [email or web address]  [phone]

## 2023-04-13 ENCOUNTER — TELEPHONE (OUTPATIENT)
Dept: PSYCHIATRY | Facility: CLINIC | Age: 48
End: 2023-04-13
Payer: COMMERCIAL

## 2023-04-13 NOTE — TELEPHONE ENCOUNTER
No response to [employer] invites. Called and LVM at 1405. Pt was no show at 1415. Abbey Hogan, CNP, 4/13/2023

## 2023-04-19 ENCOUNTER — TELEPHONE (OUTPATIENT)
Dept: PSYCHIATRY | Facility: CLINIC | Age: 48
End: 2023-04-19
Payer: COMMERCIAL

## 2023-04-19 DIAGNOSIS — F90.8 ATTENTION DEFICIT HYPERACTIVITY DISORDER (ADHD), OTHER TYPE: ICD-10-CM

## 2023-04-19 DIAGNOSIS — F41.9 ANXIETY: ICD-10-CM

## 2023-04-19 RX ORDER — ALPRAZOLAM 0.5 MG
0.5 TABLET ORAL DAILY PRN
Qty: 10 TABLET | Refills: 0 | Status: SHIPPED | OUTPATIENT
Start: 2023-04-19 | End: 2023-05-25

## 2023-04-19 RX ORDER — LISDEXAMFETAMINE DIMESYLATE 60 MG/1
60 CAPSULE ORAL EVERY MORNING
Qty: 30 CAPSULE | Refills: 0 | Status: SHIPPED | OUTPATIENT
Start: 2023-04-24 | End: 2023-05-24

## 2023-04-19 RX ORDER — LISDEXAMFETAMINE DIMESYLATE 60 MG/1
60 CAPSULE ORAL EVERY MORNING
Qty: 30 CAPSULE | Refills: 0 | Status: SHIPPED | OUTPATIENT
Start: 2023-04-19 | End: 2023-04-19

## 2023-04-19 NOTE — TELEPHONE ENCOUNTER
[facility] Call Center    Phone Message    May a detailed message be left on voicemail: yes     Reason for Call: Medication Question or concern regarding medication   Prescription Clarification  Name of Medication: Olprazalam  Prescribing Provider: Abbey Hogan   Pharmacy: [facility]   What on the order needs clarification? Pharmacy wants to know the use of the olprazalam since she's already taking a stimulant.           Action Taken: Other: [facility]    Travel Screening: Not Applicable

## 2023-04-19 NOTE — TELEPHONE ENCOUNTER
Last seen: 03/16/2023  RTC: 4 weeks   Cancel: 0  No-show: 3  Next appt: none      Incoming refill from Patient via mychart    Medication requested:   Pending Prescriptions:                       Disp   Refills    lisdexamfetamine (VYVANSE) 60 MG capsule  30 cap*0            Sig: Take 1 capsule (60 mg) by mouth every morning        Last refill per              Medication refill approved per refill protocol.

## 2023-04-19 NOTE — TELEPHONE ENCOUNTER
[facility] Call Center    Phone Message    May a detailed message be left on voicemail: yes     Reason for Call: Other: Medication Request via [employer]      Writer reached out to patient via telephone call and [employer] message to schedule a follow-up appointment with provider. Patient replied via [employer] and asked for refills on Vyvanse 60mg and asked if they could have a prescription of Xanax or Ativan. Patient stated they have been taking their gabapetin 900mg but it has not been helping with anxiety. Patient asked if these could be sent to the [facility] [address] and asked for them today as their mom is headed there and can pick them up because patient does not have their new ID yet.    [facility] [address] [place] [address].    Patient is hoping for an afternoon appointment this week or next, but writer did not see any openings. Writer is still trying to work with patient to find a time to schedule. Writer also forwarded patient's [employer] messages to provider to see about openings in their schedule.    Action Taken: Message routed to:  Other: [facility] Nurse pool    Travel Screening: Not Applicable

## 2023-04-19 NOTE — TELEPHONE ENCOUNTER
Spoke to pharmacist and explained that this is a short term medication that patient was prescribed for some situational panic attacks. They will fill med for patient.

## 2023-04-21 NOTE — TELEPHONE ENCOUNTER
Per [place] , both Xanax and Vyvanse were filled on 4/20. Client has future appt scheduled for 4/27. No further action needed by RNCC at this time.    Kita Mayo RN on 4/21/2023 at 7:39 AM

## 2023-04-25 NOTE — PROGRESS NOTES
Clinic Care Coordination Contact  [facility]/Voicemail       Clinical Data: Care Coordinator Outreach  Outreach attempted x 2.  Left message on patient's voicemail with call back information and requested return call.  Plan: Care Coordinator will send unable to contact letter with care coordinator contact information via [employer]. Care Coordinator will try to reach patient again in 10 business days.    TRAM Lancaster  Clinic Care Coordination  [facility]  [email or web address]  [phone]

## 2023-05-03 ENCOUNTER — TELEPHONE (OUTPATIENT)
Dept: PSYCHIATRY | Facility: CLINIC | Age: 48
End: 2023-05-03
Payer: COMMERCIAL

## 2023-05-03 NOTE — TELEPHONE ENCOUNTER
No response to [employer] invites.  Called and LVM at 1405. Pt was no show at 1415. Abbey Hogan, CNP, 5/3/2023

## 2023-05-09 NOTE — PROGRESS NOTES
Clinic Care Coordination Contact  [facility]/Voicemail       Clinical Data: Care Coordinator Outreach  Outreach attempted x 3.  Left message on patient's voicemail with call back information and requested return call.  Plan: Care Coordinator will do no further outreaches at this time.    Jesika Mills [facility]  Clinic Care Coordination  [facility]  [email or web address]  [phone]

## 2023-05-19 DIAGNOSIS — F41.9 ANXIETY: ICD-10-CM

## 2023-05-19 NOTE — TELEPHONE ENCOUNTER
hydrOXYzine (ATARAX) 25 MG  Last refilled: 2/16/23  Qty: 180    Last seen: 3/16/23  RTC: 1 mos  Cancel: 3/28/23  No-show: 4/11   4/13   4/27   5/3/23  Next appt: none  Refill pended and routed to the provider for review/determination due to:  Pt outside of RTC timeframe with CANCEL X 1 & NO SHOW X 4

## 2023-05-23 RX ORDER — HYDROXYZINE HYDROCHLORIDE 25 MG/1
TABLET, FILM COATED ORAL
Qty: 60 TABLET | Refills: 0 | Status: SHIPPED | OUTPATIENT
Start: 2023-05-23 | End: 2023-06-21

## 2023-05-24 DIAGNOSIS — F90.8 ATTENTION DEFICIT HYPERACTIVITY DISORDER (ADHD), OTHER TYPE: ICD-10-CM

## 2023-05-24 RX ORDER — LISDEXAMFETAMINE DIMESYLATE 60 MG/1
60 CAPSULE ORAL EVERY MORNING
Qty: 30 CAPSULE | Refills: 0 | Status: SHIPPED | OUTPATIENT
Start: 2023-05-24 | End: 2023-05-30

## 2023-05-24 NOTE — TELEPHONE ENCOUNTER
Per [place] [facility] lisdexamfetamine (VYVANSE) 60 MG capsule 4/20 #30, 3/25 #30    Writer routed to provider for approval.

## 2023-05-24 NOTE — TELEPHONE ENCOUNTER
Last Seen 3/16/23  RTC 4 weeks  Cancel 0  No-Show 4    Next Appt 5/25/23    Incoming Refill From pt request via MyChart    Medication Requested   lisdexamfetamine (VYVANSE) 60 MG capsule    Directions   Route: Take 1 capsule (60 mg) by mouth every morning - Oral    Qty 30    Last Refill 4/24/23    Medication Refill Pended Per Refill Protocol

## 2023-05-24 NOTE — TELEPHONE ENCOUNTER
[facility] Call Center    Phone Message    May a detailed message be left on voicemail: yes     Reason for Call: Medication Refill Request    Has the patient contacted the pharmacy for the refill? Yes   Name of medication being requested: Vyvanse  Provider who prescribed the medication: Dr. Hogan  Pharmacy:  [facility] [address] [place] [address].  Date medication is needed: ASAP, no medication left.       Action Taken: Other: Nurses.     Travel Screening: Not Applicable

## 2023-05-25 ENCOUNTER — VIRTUAL VISIT (OUTPATIENT)
Dept: PSYCHIATRY | Facility: CLINIC | Age: 48
End: 2023-05-25
Attending: NURSE PRACTITIONER
Payer: COMMERCIAL

## 2023-05-25 DIAGNOSIS — F41.9 ANXIETY: Primary | ICD-10-CM

## 2023-05-25 PROCEDURE — 99214 OFFICE O/P EST MOD 30 MIN: CPT | Mod: VID | Performed by: NURSE PRACTITIONER

## 2023-05-25 RX ORDER — GABAPENTIN 800 MG/1
800 TABLET ORAL 3 TIMES DAILY
Qty: 270 TABLET | Refills: 0 | Status: SHIPPED | OUTPATIENT
Start: 2023-05-25 | End: 2023-08-14

## 2023-05-25 RX ORDER — CLONIDINE HYDROCHLORIDE 0.1 MG/1
0.1 TABLET ORAL DAILY PRN
Qty: 30 TABLET | Refills: 1 | Status: SHIPPED | OUTPATIENT
Start: 2023-05-25 | End: 2023-07-12

## 2023-05-25 RX ORDER — GABAPENTIN 100 MG/1
100 CAPSULE ORAL 3 TIMES DAILY
Qty: 90 CAPSULE | Refills: 1 | Status: SHIPPED | OUTPATIENT
Start: 2023-05-25 | End: 2023-08-23

## 2023-05-25 NOTE — NURSING NOTE
Is the patient currently in the state of [place]? YES    Visit mode:VIDEO    If the visit is dropped, the patient can be reconnected by: VIDEO VISIT: Text to cell phone: [phone]    Will anyone else be joining the visit? NO      How would you like to obtain your AVS? MyChart    Are changes needed to the allergy or medication list? YES: Flagged for removal: ferrous sulfate 325mg, triamcinolone 0.1% cream, & vitamin B complex.    Reason for visit: RECHECK    Patient will complete questionnaires prior to joining video.    JOSE ELIAS Graham on 5/25/2023 at 9:13 AM
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
## 2023-05-25 NOTE — PATIENT INSTRUCTIONS
-Xanax is discontinued.  -May take Clonidine 0.1 mg daily as needed for anxiety.  Please check your blood pressure before taking at least first few times. If your blood pressure is less than 90/60 or pulse is less than 60 per minute, hold the medication.  -Continue all other medication regimen for now. Gabapentin is now ordered with 100 mg and 800 mg, please read the label carefully.    Your next appointment is scheduled on 6/8/2023 (Thu) at 9:30am.      **For crisis resources, please see the information at the end of this document**   Patient Education    Thank you for coming to the [facility] [place] [facility].     Lab Testing:  If you had lab testing today and your results are reassuring or normal they will be mailed to you or sent through [facility] [employer] [facility] within 7 days. If the lab tests need quick action we will call you with the results. The phone number we will call with results is # [phone]. If this is not the best number please call our clinic and change the number.     Medication Refills:  If you need any refills please call your pharmacy and they will contact us. Our fax number for refills is [phone].   Three business days of notice are needed for general medication refill requests.   Five business days of notice are needed for controlled substance refill requests.   If you need to change to a different pharmacy, please contact the new pharmacy directly. The new pharmacy will help you get your medications transferred.     Contact Us:  Please call [phone] during business hours (8-5:00 M-F).   If you have medication related questions after clinic hours, or on the weekend, please call [phone].     Financial Assistance [phone]   Medical Records [phone]       MENTAL HEALTH CRISIS RESOURCES:  For a emergency help, please call 911 or go to the nearest Emergency Department.     Emergency Walk-In Options:   EmPATH Unit @ [facility]): [phone]  - Specialized mental health emergency area designed to be calming  [facility] ([place]): [phone]  [facility] ([place]): [phone]  [facility] ([place]): [phone]    [facility] Crisis Information:   [place]: [phone]  [place]: [phone]  [place] ([facility]) - Adult: [phone]     Child: [phone]  [place] - Adult: [phone]     Child: [phone]  [place]: [phone]  List of all [facility] resources:   https://[place].gov/dhs/people-we-serve/adults/health-care/mental-health/resources/crisis-contacts.jsp    National Crisis Information:   Crisis Text Line: Text  [place]  to 879766  Suicide & Crisis Lifeline: 988  National Suicide Prevention Lifeline: 6-842-052-TALK (1-283.373.8376)       For online chat options, visit https://suicidepreventionlifeline.org/chat/  Poison Control Center: 1-931.345.9990  Trans Lifeline: 1-137.878.4518 - Hotline for transgender people of all ages  The [employer]: 1-495.451.7294 - Hotline for LGBT youth     For Non-Emergency Support:   Fast Tracker: Mental Health & Substance Use Disorder Resources -   https://www.[employer].org/

## 2023-05-30 ENCOUNTER — MYC MEDICAL ADVICE (OUTPATIENT)
Dept: PSYCHIATRY | Facility: CLINIC | Age: 48
End: 2023-05-30
Payer: COMMERCIAL

## 2023-05-30 DIAGNOSIS — F90.8 ATTENTION DEFICIT HYPERACTIVITY DISORDER (ADHD), OTHER TYPE: ICD-10-CM

## 2023-05-30 DIAGNOSIS — F90.8 ATTENTION DEFICIT HYPERACTIVITY DISORDER (ADHD), OTHER TYPE: Primary | ICD-10-CM

## 2023-05-30 RX ORDER — DEXTROAMPHETAMINE SACCHARATE, AMPHETAMINE ASPARTATE MONOHYDRATE, DEXTROAMPHETAMINE SULFATE AND AMPHETAMINE SULFATE 7.5; 7.5; 7.5; 7.5 MG/1; MG/1; MG/1; MG/1
30 CAPSULE, EXTENDED RELEASE ORAL DAILY
Qty: 30 CAPSULE | Refills: 0 | Status: SHIPPED | OUTPATIENT
Start: 2023-05-30 | End: 2023-06-02

## 2023-06-02 RX ORDER — DEXTROAMPHETAMINE SACCHARATE, AMPHETAMINE ASPARTATE MONOHYDRATE, DEXTROAMPHETAMINE SULFATE AND AMPHETAMINE SULFATE 7.5; 7.5; 7.5; 7.5 MG/1; MG/1; MG/1; MG/1
30 CAPSULE, EXTENDED RELEASE ORAL DAILY
Qty: 30 CAPSULE | Refills: 0 | Status: SHIPPED | OUTPATIENT
Start: 2023-06-02 | End: 2023-07-10

## 2023-06-02 NOTE — TELEPHONE ENCOUNTER
Prescription pended with new pharmacy attached and routed to [facility] for approval.    Kita Mayo RN on 6/2/2023 at 10:44 AM

## 2023-06-08 ENCOUNTER — DOCUMENTATION ONLY (OUTPATIENT)
Dept: PSYCHIATRY | Facility: CLINIC | Age: 48
End: 2023-06-08
Payer: COMMERCIAL

## 2023-06-21 ENCOUNTER — MYC REFILL (OUTPATIENT)
Dept: PSYCHIATRY | Facility: CLINIC | Age: 48
End: 2023-06-21
Payer: COMMERCIAL

## 2023-06-21 DIAGNOSIS — F41.9 ANXIETY: ICD-10-CM

## 2023-06-21 RX ORDER — HYDROXYZINE HYDROCHLORIDE 25 MG/1
TABLET, FILM COATED ORAL
Qty: 60 TABLET | Refills: 0 | Status: SHIPPED | OUTPATIENT
Start: 2023-06-21 | End: 2023-07-10

## 2023-06-21 NOTE — TELEPHONE ENCOUNTER
Last Seen: 5-   RTC: 2 weeks   Cancel: yes   No-Show: yes   Next Appt: none     Incoming Refill From patient  via  mychart     Medication Requested: hydrOXYzine (ATARAX) 25 MG tablet  Directions: Sig: TAKE 1 TO 2 TABLETS BY MOUTH EVERY 8 HOURS AS NEEDED FOR ITCHING OR  ANXIETY*SCHEDULE APPT. FOR REFILLS  Qty: 60  Last Refill: 5-    Medication Refill pended  Per Refill Protocol       Nicole Witt on 6/21/2023 at 11:29 AM

## 2023-07-10 ENCOUNTER — MYC REFILL (OUTPATIENT)
Dept: PSYCHIATRY | Facility: CLINIC | Age: 48
End: 2023-07-10
Payer: COMMERCIAL

## 2023-07-10 DIAGNOSIS — F90.8 ATTENTION DEFICIT HYPERACTIVITY DISORDER (ADHD), OTHER TYPE: ICD-10-CM

## 2023-07-10 DIAGNOSIS — F41.9 ANXIETY: ICD-10-CM

## 2023-07-10 RX ORDER — HYDROXYZINE HYDROCHLORIDE 25 MG/1
TABLET, FILM COATED ORAL
Qty: 60 TABLET | Refills: 0 | Status: SHIPPED | OUTPATIENT
Start: 2023-07-10 | End: 2023-08-14

## 2023-07-10 RX ORDER — DEXTROAMPHETAMINE SACCHARATE, AMPHETAMINE ASPARTATE MONOHYDRATE, DEXTROAMPHETAMINE SULFATE AND AMPHETAMINE SULFATE 7.5; 7.5; 7.5; 7.5 MG/1; MG/1; MG/1; MG/1
30 CAPSULE, EXTENDED RELEASE ORAL DAILY
Qty: 30 CAPSULE | Refills: 0 | Status: SHIPPED | OUTPATIENT
Start: 2023-07-10 | End: 2023-08-14

## 2023-07-10 NOTE — TELEPHONE ENCOUNTER
Last Seen: 5-25-23  RTC: 6-696  Cancel: none   No-Show: yes   Next Appt: none     Incoming Refill From patient  via mychart       Medication Requested: amphetamine-dextroamphetamine (ADDERALL XR) 30 MG 24 hr capsule  Directions: Sig - Route: Take 1 capsule (30 mg) by mouth daily - Oral  Qty:  30   Last Refill:           Medication Requested: hydrOXYzine (ATARAX) 25 MG tablet  Directions: Sig: TAKE 1 TO 2 TABLETS BY MOUTH EVERY 8 HOURS AS NEEDED FOR ITCHING OR  ANXIETY.  Qty: 60   Last Refill:       Medication Refill pended  Per Refill Protocol     Nicole Witt on 7/10/2023 at 8:41 AM

## 2023-07-11 DIAGNOSIS — F41.9 ANXIETY: ICD-10-CM

## 2023-07-12 RX ORDER — CLONIDINE HYDROCHLORIDE 0.1 MG/1
TABLET ORAL
Qty: 30 TABLET | Refills: 0 | Status: SHIPPED | OUTPATIENT
Start: 2023-07-12 | End: 2023-08-14

## 2023-07-12 NOTE — TELEPHONE ENCOUNTER
Medication requested: cloNIDine HCl 0.1 MG Oral Tablet   Last refilled: 5/25/23  Qty: 30/1       Last Seen: 5-25-23  RTC: 6-8-23  Cancel: none   No-Show: yes   Next Appt: none     Refill decision:   Refill pended and routed to the provider for review/determination due to no-show 6/8/23

## 2023-07-30 ENCOUNTER — HEALTH MAINTENANCE LETTER (OUTPATIENT)
Age: 48
End: 2023-07-30

## 2023-08-10 ENCOUNTER — DOCUMENTATION ONLY (OUTPATIENT)
Dept: PSYCHIATRY | Facility: CLINIC | Age: 48
End: 2023-08-10
Payer: COMMERCIAL

## 2023-08-10 NOTE — PROGRESS NOTES
Per VF, pt is not in [place] and not in emergency. Pt has missed numerous appointment in April due to housing instability and last was in [place]. Pt was last seen 5/25. Pt requesting to come off of Effexor.  Messaged a medical director if there's anyone who is licensed in [place] to care for her to taper off Effexor and continue care.  Otherwise, will recommend establishing PCP in [place] to taper off Effexor and continue stimulant prescription. Abbey Hogan, CNP, 8/10/2023

## 2023-08-11 DIAGNOSIS — F41.9 ANXIETY: ICD-10-CM

## 2023-08-11 DIAGNOSIS — F90.8 ATTENTION DEFICIT HYPERACTIVITY DISORDER (ADHD), OTHER TYPE: ICD-10-CM

## 2023-08-11 NOTE — TELEPHONE ENCOUNTER
Last seen: 05/25/2023  RTC: 6/8/2023   Cancel: 08/10/2023  No-show: 06/08/2023  Next appt: None     Incoming refill from Patient via phone    Medication requested:   Pending Prescriptions:                       Disp   Refills    cloNIDine (CATAPRES) 0.1 MG tablet        30 tab*0            Sig: TAKE 1 TABLET BY MOUTH ONCE DAILY AS NEEDED (FOR           ANXIETY)    hydrOXYzine (ATARAX) 25 MG tablet         60 tab*0            Sig: TAKE 1 TO 2 TABLETS BY MOUTH EVERY 8 HOURS AS           NEEDED FOR ITCHING OR  ANXIETY.    amphetamine-dextroamphetamine (ADDERALL X*30 cap*0            Sig: Take 1 capsule (30 mg) by mouth daily    gabapentin (NEURONTIN) 800 MG tablet      90 tab*0            Sig: Take 1 tablet (800 mg) by mouth 3 times daily        Last refill per       From chart note:   -May take Clonidine 0.1 mg daily as needed for anxiety.  Please check your blood pressure before taking at least first few times. If your blood pressure is less than 90/60 or pulse is less than 60 per minute, hold the medication.  -Continue all other medication regimen for now. Gabapentin is now ordered with 100 mg and 800 mg, please read the label carefully.      Medication sent to provider for review.

## 2023-08-11 NOTE — TELEPHONE ENCOUNTER
[facility] Call [facility]    Phone Message    May a detailed message be left on voicemail: yes     Reason for Call: Other: Patient called requesting a refill of all their listed prescriptions, specifying they needed a refill of their prescription of Adderall 30mg, and their prescription of Hydroxyzine 25mg. Patient stated that previous refill was sent to the wrong pharmacy, and was told to reach back out in order to request the refill to be sent to their preferred pharmacy.  Patient requested that the refills be sent to [id number] [place] [address] [address]  AT [address]       Action Taken: Message routed to:  Other: [facility] psychiatry nurse pool    Travel Screening: Not Applicable

## 2023-08-14 RX ORDER — HYDROXYZINE HYDROCHLORIDE 25 MG/1
TABLET, FILM COATED ORAL
Qty: 60 TABLET | Refills: 0 | Status: SHIPPED | OUTPATIENT
Start: 2023-08-14 | End: 2023-08-23

## 2023-08-14 RX ORDER — GABAPENTIN 800 MG/1
800 TABLET ORAL 3 TIMES DAILY
Qty: 90 TABLET | Refills: 0 | Status: SHIPPED | OUTPATIENT
Start: 2023-08-14 | End: 2023-08-23

## 2023-08-14 RX ORDER — DEXTROAMPHETAMINE SACCHARATE, AMPHETAMINE ASPARTATE MONOHYDRATE, DEXTROAMPHETAMINE SULFATE AND AMPHETAMINE SULFATE 7.5; 7.5; 7.5; 7.5 MG/1; MG/1; MG/1; MG/1
30 CAPSULE, EXTENDED RELEASE ORAL DAILY
Qty: 30 CAPSULE | Refills: 0 | Status: SHIPPED | OUTPATIENT
Start: 2023-08-14 | End: 2023-08-23

## 2023-08-14 RX ORDER — CLONIDINE HYDROCHLORIDE 0.1 MG/1
TABLET ORAL
Qty: 30 TABLET | Refills: 0 | Status: SHIPPED | OUTPATIENT
Start: 2023-08-14 | End: 2023-08-23

## 2023-08-18 DIAGNOSIS — F41.9 ANXIETY: ICD-10-CM

## 2023-08-22 RX ORDER — ALPRAZOLAM 0.5 MG
0.5 TABLET ORAL DAILY PRN
Qty: 5 TABLET | Refills: 0 | OUTPATIENT
Start: 2023-08-22

## 2023-08-23 DIAGNOSIS — F41.9 ANXIETY: ICD-10-CM

## 2023-08-23 DIAGNOSIS — F90.8 ATTENTION DEFICIT HYPERACTIVITY DISORDER (ADHD), OTHER TYPE: ICD-10-CM

## 2023-08-23 RX ORDER — HYDROXYZINE HYDROCHLORIDE 25 MG/1
25-50 TABLET, FILM COATED ORAL EVERY 8 HOURS PRN
Qty: 60 TABLET | Refills: 0 | Status: SHIPPED | OUTPATIENT
Start: 2023-08-23 | End: 2023-09-16

## 2023-08-23 RX ORDER — CLONIDINE HYDROCHLORIDE 0.1 MG/1
0.1 TABLET ORAL DAILY PRN
Qty: 30 TABLET | Refills: 0 | Status: SHIPPED | OUTPATIENT
Start: 2023-08-23 | End: 2023-10-09

## 2023-08-23 RX ORDER — DEXTROAMPHETAMINE SACCHARATE, AMPHETAMINE ASPARTATE MONOHYDRATE, DEXTROAMPHETAMINE SULFATE AND AMPHETAMINE SULFATE 7.5; 7.5; 7.5; 7.5 MG/1; MG/1; MG/1; MG/1
30 CAPSULE, EXTENDED RELEASE ORAL DAILY
Qty: 30 CAPSULE | Refills: 0 | Status: SHIPPED | OUTPATIENT
Start: 2023-08-23

## 2023-08-23 RX ORDER — GABAPENTIN 800 MG/1
800 TABLET ORAL 3 TIMES DAILY
Qty: 90 TABLET | Refills: 0 | Status: SHIPPED | OUTPATIENT
Start: 2023-08-23 | End: 2023-08-25

## 2023-08-23 NOTE — TELEPHONE ENCOUNTER
"Medication requested:  ALPRAZolam 0.5 MG Oral Tablet   Last refilled: 4/19/23  Qty: 10/0 End 5/25/23       Last seen: 05/25/2023  RTC: 6/8/2023   Cancel: 08/10/2023  No-show: 06/08/2023  Next appt: None    Refill decision: per 5/25/23 note: \"However, pt is concerned about dependency on Xanax and does not want to continue the medication. Thus Xanax is discontinued. \"    no show x 1, CX x 1, controlled med, medication not active. Per documentation, patient currently in [place] as of 8/10/23      "

## 2023-08-23 NOTE — TELEPHONE ENCOUNTER
[facility] Call Center    Phone Message    May a detailed message be left on voicemail: yes     Reason for Call: Medication Refill Request    Has the patient contacted the pharmacy for the refill? Yes   Name of medication being requested: Clonidine, gabapentin  Provider who prescribed the medication: Abbeypa Leonardovinayak  [id number] - [place], [place] - [address] AT [address] [place]   Date medication is needed: ASAP, patient reports being out of all medications    Patient states that pharmacy told her that Abbey Hogan is not covered by Medicaid in [place] and that another provider needs to sign off. Patient would like a follow-up call from RN to assist her with this issue since she's not sure what the problem is with insurance. Patient scheduled a follow-up appointment with provider for end of September when she will be back in [place].    Action Taken: Message routed to:  Other: [facility] PSYCHIATRY NURSE-[facility]    Travel Screening: Not Applicable

## 2023-08-25 ENCOUNTER — MYC REFILL (OUTPATIENT)
Dept: PSYCHIATRY | Facility: CLINIC | Age: 48
End: 2023-08-25
Payer: COMMERCIAL

## 2023-08-25 DIAGNOSIS — F41.9 ANXIETY: ICD-10-CM

## 2023-08-25 RX ORDER — GABAPENTIN 800 MG/1
800 TABLET ORAL 3 TIMES DAILY
Qty: 90 TABLET | Refills: 0 | Status: SHIPPED | OUTPATIENT
Start: 2023-08-25 | End: 2023-10-06

## 2023-08-25 NOTE — TELEPHONE ENCOUNTER
Per [place] , [place]abap[place] 800 mg was last filled in [place] on 7/10/23 for #65. This was filled with 100 mg caps for #90.     Prior to that, it was [place]ab[place] was [place] at [place] pharmacies:    -800 m23 (30 d/s)  -100 m23 (30 d/s), 23 (30 d/s)   -400 mg: 3/25/23 (30 d/s)    Xanax was filled only at [place] pharmacies on 23 (#10) and 23 (#5)    Will gather more information surrounding patient's use of Xanax, as Abbey documented on  that this will be discontinued, as patient is worried about dependency on the medication, along with oversedation.    Patient has future appt with Abbey on     Kita Mayo RN on 2023 at 3:47 PM

## 2023-08-28 ENCOUNTER — PATIENT OUTREACH (OUTPATIENT)
Dept: CARE COORDINATION | Facility: CLINIC | Age: 48
End: 2023-08-28
Payer: COMMERCIAL

## 2023-08-28 RX ORDER — ALPRAZOLAM 0.5 MG
0.5 TABLET ORAL DAILY PRN
Qty: 15 TABLET | Refills: 0 | Status: SHIPPED | OUTPATIENT
Start: 2023-08-28

## 2023-08-28 RX ORDER — ALPRAZOLAM 0.5 MG
0.5 TABLET ORAL DAILY PRN
Qty: 15 TABLET | Refills: 0 | Status: CANCELLED | OUTPATIENT
Start: 2023-08-28

## 2023-08-28 NOTE — TELEPHONE ENCOUNTER
Abbey Hogan sent updated gabapentin Rx on 8/25. Writer called [facility] and confirmed the order for gabapentin by Dr. Wilkins on 8/23 was discontinued. The pharmacy tech reports the gabapentin Rx is currently stored on Mason's profile but believes it should go through without issue once Mason requests it. The pharmacy tech also confirmed the other medications are currently being processed and are going through just fine. Although, the tech could not confirm if this was with insurance or a coupon to pay out of pocket. Will try and gather more information from Mason.    Per Abbey's routing comment, they will approve a Rx for Xanax 0.5 mg, taking 1 tab by mouth daily PRN, #15 for 30 days with 0 refills. This Rx was pended and routed to the provider for approval.    Kita Mayo RN on 8/28/2023 at 9:24 AM

## 2023-08-28 NOTE — TELEPHONE ENCOUNTER
Patrickr called [facility] pharmacy again, to try and gather more information surrounding the insurance issues. This time,  spoke with a pharmacist who has been working on this for weeks with the patient. She relayed the following information:    -Patient has both [place] and [place] MA, but no [place] MA    -Because she does not have any [place] insurance, the pharmacy can process the medication, but she will have to pay out of pocket and use a discount coupon. Pharmacist assumes patient no longer wants to fill her meds because of the cost. The pharmacist also shared that she has discussed this with Mason multiple times and was under the impression that Mason was going to try and get [place] MA.    *Confirmed with clinic manager that because patient can still be seen if she crosses state lines and is in [place] for an appt with Abbey. At that time,  would suggest the patient picks up her medications at a [place] pharmacy.    Called Mason to suggest this plan. No answer. LVM with this information and requested a c/b or [employer] message if she would like to move forward with this plan.    Of note,  tried calling Mason earlier regarding her last [employer] message. At that time, the call rang x2 and was then forwarded to her . Additional message was left at that time.    Kita Mayo RN on 8/28/2023 at 1:19 PM

## 2023-08-28 NOTE — PROGRESS NOTES
Clinic Care Coordination Contact  Clinic Care Coordination Contact  OUTREACH    Referral Information: RNCC     Chief Complaint   Patient presents with    Clinic Care Coordination - Initial      Universal Utilization:    Utilization      Hospital Admissions  0             ED Visits  0             No Show Count (past year)  5                    Current as of: 2023 11:20 PM              Clinical Concerns:  Current Medical Concerns: Did not discuss.    Current Behavioral Concerns:     [facility] contacted patient to follow up on referral sent by nursing. Patient states that she and her mom are currently living with her son in [place]. Patient states that her Section 8 Voucher  because she wasn't able to find a new apartment in time. Patient's income is ~$200/month in General Assistance. [facility]  to be evaluated for a CADI Waiver. Patient worries about moving forward with this assessment, though, since she is not currently living in [facility].    Patient states that her mom is currently on an Elderly Waiver, and that they could potentially pay for an apartment with this waiver. Patient requested resources for apartments that accept waivers.    Patient states that she completed her insurance renewal, and that it will be active again on .    Education Provided to patient: Patient was provided with resources for apartments that accept Elderly Waivers.      Medication Management:  Medication review status: Did not discuss.    Lifestyle & Psychosocial Needs:    Social Determinants of Health     Tobacco Use: High Risk (2023)    Patient History     Smoking Tobacco Use: Every Day     Smokeless Tobacco Use: Never     Passive Exposure: Not on file   Alcohol Use: Not on file   Financial Resource Strain: Not on file   Food Insecurity: Not on file   Transportation Needs: Not on file   Physical Activity: Not on file   Stress: Not on file   Social  Connections: Not on file   Intimate Partner Violence: Not on file   Depression: At risk (8/10/2023)    PHQ-2     PHQ-2 Score: 6   Housing Stability: Not on file     The patient consented via Verbal consent to have contact information and resources sent via email in an unencrypted manner.    Patient/Caregiver understanding: Patient verbalized understanding, engaged in AIDET communication during patient encounter.     Future Appointments                In 1 month Abbey Hogan APRN CNP [facility] Mental Health & Addiction [facility]          Plan: [facility] will send resources to patient via email. Will follow up with patient in one month.    Jesika Mills, \[facility]\""  Clinic Care Coordination  [facility]  [email or web address]  [phone]

## 2023-09-16 DIAGNOSIS — F41.9 ANXIETY: ICD-10-CM

## 2023-09-16 DIAGNOSIS — F33.0 MILD EPISODE OF RECURRENT MAJOR DEPRESSIVE DISORDER (H): ICD-10-CM

## 2023-09-18 RX ORDER — HYDROXYZINE HYDROCHLORIDE 25 MG/1
25-50 TABLET, FILM COATED ORAL EVERY 8 HOURS PRN
Qty: 60 TABLET | Refills: 0 | Status: SHIPPED | OUTPATIENT
Start: 2023-09-18 | End: 2023-10-16

## 2023-09-18 RX ORDER — VENLAFAXINE HYDROCHLORIDE 75 MG/1
75 CAPSULE, EXTENDED RELEASE ORAL DAILY
Qty: 30 CAPSULE | Refills: 0 | Status: SHIPPED | OUTPATIENT
Start: 2023-09-18

## 2023-09-18 NOTE — TELEPHONE ENCOUNTER
Medication requested: hydrOXYzine (ATARAX) 25 MG tablet   Last refilled: 8/23/23  Qty: 60    Medication requested: venlafaxine (EFFEXOR XR) 75 MG 24 hr capsule   Last refilled: 2/16/23  Qty: 90/1      Last seen: 5/25/23  RTC: 2 weeks  Cancel: x 1 on 8/10/23  No-show: x 1 on 6/8/23  Next appt: 9/27/23    Refill decision:   Refill pended and routed to the provider for review/determination due to   Cancel x 1  No show x 1  Pt outside of RTC timeframe.  Scheduled for follow-up 9/27/23

## 2023-09-27 ENCOUNTER — PATIENT OUTREACH (OUTPATIENT)
Dept: CARE COORDINATION | Facility: CLINIC | Age: 48
End: 2023-09-27
Payer: COMMERCIAL

## 2023-09-27 NOTE — PROGRESS NOTES
Clinic Care Coordination Contact  [facility]/Voicemail       Clinical Data: Care Coordinator Outreach  Outreach attempted x 1.  Left message on patient's voicemail with call back information and requested return call.  Plan: Care Coordinator will try to reach patient again in 10 business days.    Jesika Mills \[facility]\""  Clinic Care Coordination  [facility]  [email or web address]  [phone]

## 2023-10-06 DIAGNOSIS — F41.9 ANXIETY: ICD-10-CM

## 2023-10-06 RX ORDER — GABAPENTIN 800 MG/1
800 TABLET ORAL 3 TIMES DAILY
Qty: 90 TABLET | Refills: 0 | Status: SHIPPED | OUTPATIENT
Start: 2023-10-06 | End: 2023-11-08

## 2023-10-06 NOTE — TELEPHONE ENCOUNTER
Last Seen 5/25/23  RTC 2 weeks  Cancel 2  No-Show 1    Next Appt none    Incoming Refill From [facility] via fax    Medication Requested   gabapentin (NEURONTIN) 800 MG tablet     Directions   Route: Take 1 tablet (800 mg) by mouth 3 times daily - Oral     Qty 90    Last Refill 8/25/23    Medication Refill Pended Per Refill Protocol

## 2023-10-06 NOTE — TELEPHONE ENCOUNTER
Per [place]  gabapentin (NEURONTIN) 800 MG tablet  9/1 #90, 7/10 #65, 5/25 #90    Writer routed to provider for approval.

## 2023-10-09 ENCOUNTER — PATIENT OUTREACH (OUTPATIENT)
Dept: CARE COORDINATION | Facility: CLINIC | Age: 48
End: 2023-10-09
Payer: COMMERCIAL

## 2023-10-09 DIAGNOSIS — F41.9 ANXIETY: ICD-10-CM

## 2023-10-09 RX ORDER — CLONIDINE HYDROCHLORIDE 0.1 MG/1
0.1 TABLET ORAL DAILY PRN
Qty: 30 TABLET | Refills: 0 | Status: SHIPPED | OUTPATIENT
Start: 2023-10-09 | End: 2023-10-10

## 2023-10-09 NOTE — TELEPHONE ENCOUNTER
Medication requested:  cloNIDine (CATAPRES) 0.1 MG tablet   Last refilled: 8/23/23  Qty: 30       Last seen: 5/25/23  RTC: 2 weeks  Cancel: x 1 on 8/10/23 and 9/27/23  No-show: x 1 on 6/8/23  Next appt: 0       Refill decision:   Refill pended and routed to the provider for review/determination due to   Cancel x 2  No Show x 1  Pt outside of RTC timeframe.  Scheduling has been notified to contact the pt for appointment.

## 2023-10-09 NOTE — PROGRESS NOTES
Clinic Care Coordination Contact  Follow Up Progress Note      Assessment: Psychiatric contacted patient to check in. Patient states that she and her mother are still working on getting moved back to [place]. She states that they are seeking transitional housing where they can stay while looking for a more permanent apartment. Patient requested that I send her resources for transitional housing options.     Care Gaps:    Health Maintenance Due   Topic Date Due    ADVANCE CARE PLANNING  Never done    DEPRESSION ACTION PLAN  Never done    HEPATITIS B IMMUNIZATION (1 of 3 - 3-dose series) Never done    COLORECTAL CANCER SCREENING  Never done    HIV SCREENING  Never done    HEPATITIS C SCREENING  Never done    LIPID  Never done    NICOTINE/TOBACCO CESSATION COUNSELING Q 1 YR  08/04/2022    YEARLY PREVENTIVE VISIT  05/26/2023    MAMMO SCREENING  05/26/2023    INFLUENZA VACCINE (1) 09/01/2023    COVID-19 Vaccine (4 - 2023-24 season) 09/01/2023     Care Plans    Intervention/Education provided during outreach: Patient verbalized understanding, engaged in AIDET communication during patient encounter.     Plan: Will send transitional housing resources to patient.    Care Coordinator will follow up in one month.    Jesika Mills [facility]  Clinic Care Coordination  [facility]  [email or web address]  [phone]

## 2023-10-10 DIAGNOSIS — F41.9 ANXIETY: ICD-10-CM

## 2023-10-10 RX ORDER — CLONIDINE HYDROCHLORIDE 0.1 MG/1
0.1 TABLET ORAL DAILY PRN
Qty: 90 TABLET | Refills: 0 | Status: SHIPPED | OUTPATIENT
Start: 2023-10-10

## 2023-10-10 NOTE — TELEPHONE ENCOUNTER
----- Message from ERNIE Barcenas CNP sent at 10/10/2023 11:06 AM CDT -----  I am ok with that. I am concerned about this pt not following up because she [place] out of the state.  ----- Message -----  From: Nicole Witt  Sent: 10/10/2023   9:45 AM CDT  To: ERNIE Barcenas CNP,    Do you want a 90 days supply for the Clonidine 0.1 mg tablets ? It was sent yesterday to [place], [place] pharmacy .    Thanks,    Nicole GANDARA

## 2023-10-14 DIAGNOSIS — F41.9 ANXIETY: ICD-10-CM

## 2023-10-16 RX ORDER — HYDROXYZINE HYDROCHLORIDE 25 MG/1
TABLET, FILM COATED ORAL
Qty: 60 TABLET | Refills: 0 | Status: SHIPPED | OUTPATIENT
Start: 2023-10-16 | End: 2023-11-08

## 2023-10-16 NOTE — TELEPHONE ENCOUNTER
Medication requested: HYDROXYZINE HCL 25MG TABS (WHITE)   Last refilled: 9-18-23  Qty: 60      Last seen: 5-25-23  RTC: 2 weeks  Cancel: 2  No-show: 1  Next appt: none    Refill decision:   Refill pended and routed to the provider for review/determination due to   2 cancel appt, 1 no show  Will send FYI to provider as is outside RTC timeframe.  Scheduling has been notified to contact the pt for appointment.

## 2023-11-08 ENCOUNTER — PATIENT OUTREACH (OUTPATIENT)
Dept: CARE COORDINATION | Facility: CLINIC | Age: 48
End: 2023-11-08
Payer: COMMERCIAL

## 2023-11-08 NOTE — PROGRESS NOTES
Clinic Care Coordination Contact  [facility]/Voicemail    Clinical Data: Care Coordinator Outreach    Outreach Documentation Number of Outreach Attempt   11/8/2023  12:54 PM 1     Left message on patient's voicemail with call back information and requested return call.    Plan: Care Coordinator will try to reach patient again in 1 month.    TRAM Lancaster  Clinic Care Coordination  [facility]  [email or web address]  [phone]

## 2023-11-21 ENCOUNTER — DOCUMENTATION ONLY (OUTPATIENT)
Dept: PSYCHIATRY | Facility: CLINIC | Age: 48
End: 2023-11-21
Payer: COMMERCIAL

## 2023-12-05 DIAGNOSIS — F41.9 ANXIETY: ICD-10-CM

## 2023-12-08 ENCOUNTER — PATIENT OUTREACH (OUTPATIENT)
Dept: CARE COORDINATION | Facility: CLINIC | Age: 48
End: 2023-12-08
Payer: COMMERCIAL

## 2023-12-08 RX ORDER — HYDROXYZINE HYDROCHLORIDE 25 MG/1
25-50 TABLET, FILM COATED ORAL EVERY 8 HOURS PRN
Qty: 60 TABLET | Refills: 0 | Status: SHIPPED | OUTPATIENT
Start: 2023-12-08

## 2023-12-08 NOTE — PROGRESS NOTES
Clinic Care Coordination Contact  [facility]/Voicemail    Clinical Data: Care Coordinator Outreach    Outreach Documentation Number of Outreach Attempt   11/8/2023  12:54 PM 1   12/8/2023   9:40 AM 2     Left message on patient's voicemail with call back information and requested return call.    Plan: Care Coordinator will try to reach patient again in 1 month.    Jesika Mills [facility]  Clinic Care Coordination  [facility]  [email or web address]  [phone]

## 2023-12-08 NOTE — TELEPHONE ENCOUNTER
Medication requested: hydrOXYzine (ATARAX) 25 MG tablet  Last refilled: 11/8/2023  Qty: 180/0       Last seen: 5/25/2023  [facility]     Abbey Hogan APRN CNP     RTC:    2 weeks (soonest opening)   Cancel: x2 (9/27, 8/10)  No-show: x2 (11/21, 6/8)  Next appt: 0     Refill decision: Refill pended and routed to the provider for review/determination due to Cxl x2, no show x2, no follow up scheduled.    Scheduling has been notified to contact the pt for appointment.

## 2023-12-19 ENCOUNTER — MYC MEDICAL ADVICE (OUTPATIENT)
Dept: PSYCHIATRY | Facility: CLINIC | Age: 48
End: 2023-12-19

## 2024-01-09 ENCOUNTER — PATIENT OUTREACH (OUTPATIENT)
Dept: CARE COORDINATION | Facility: CLINIC | Age: 49
End: 2024-01-09
Payer: COMMERCIAL

## 2024-01-09 NOTE — PROGRESS NOTES
Clinic Care Coordination Contact  [facility] [[email or web address]Voicemail    Clinical Data: Care Coordinator Outreach    Outreach Documentation Number of Outreach Attempt   11/8/2023  12:54 PM 1   12/8/2023   9:40 AM 2   1/9/2024   9:35 AM 3     Left message on patient's voicemail with call back information and requested return call.    Plan: Care Coordinator will do no further outreaches at this time.    Jesika Mills KOLTON  Clinic Care Coordination  [facility]  [email or web address]  [phone]

## 2024-01-28 DIAGNOSIS — F33.0 MILD EPISODE OF RECURRENT MAJOR DEPRESSIVE DISORDER (H): ICD-10-CM

## 2024-01-28 DIAGNOSIS — F41.9 ANXIETY: ICD-10-CM

## 2024-01-29 RX ORDER — HYDROXYZINE HYDROCHLORIDE 25 MG/1
TABLET, FILM COATED ORAL
Qty: 60 TABLET | Refills: 0 | OUTPATIENT
Start: 2024-01-29

## 2024-01-29 RX ORDER — VENLAFAXINE HYDROCHLORIDE 75 MG/1
75 CAPSULE, EXTENDED RELEASE ORAL DAILY
Qty: 90 CAPSULE | Refills: 0 | OUTPATIENT
Start: 2024-01-29

## 2024-01-29 NOTE — TELEPHONE ENCOUNTER
Per Psychiatry: Pt has been strongly recommended to establish PCP in [place] to manage meds. No further refills after those given on 12/8/23.

## 2024-03-12 ENCOUNTER — PATIENT OUTREACH (OUTPATIENT)
Dept: CARE COORDINATION | Facility: CLINIC | Age: 49
End: 2024-03-12
Payer: COMMERCIAL

## 2024-03-12 NOTE — PROGRESS NOTES
Clinic Care Coordination Contact  [facility]/Voicemail    Clinical Data: Care Coordinator Outreach    Outreach Documentation Number of Outreach Attempt   3/12/2024   1:56 PM 1     Left message on patient's voicemail with call back information and requested return call.    Plan: Care Coordinator will try to reach patient again in 10 business days.    TRAM Lancaster  Clinic Care Coordination  [facility]  [email or web address]  [phone]

## 2024-03-14 NOTE — PROGRESS NOTES
"Clinic Care Coordination Contact  Follow Up Progress Note      Assessment: [facility] received return call from patient. Patient reports that she is needing help getting a ride from [place] to [place]. Informed patient that [employer] has reasonably priced trips available. Patient reports that she absolutely cannot ride on a bus unless she is sedated. Patient is unable to get a ride from her son. Patient then states \"I guess I will just have to wait until I move into student housing in August. I'll just receive no medical care until then\". Offered to assist patient with finding a provider in [place]. Patient states \"That is not feasible. I'm not changing my permanent address and I'm not changing my insurance. I guess you can't help me with the things that I need.\" Patient was encouraged to reach out if further needs arise.    Care Gaps:    Health Maintenance Due   Topic Date Due    ADVANCE CARE PLANNING  Never done    DEPRESSION ACTION PLAN  Never done    GLUCOSE  Never done    COLORECTAL CANCER SCREENING  Never done    HIV SCREENING  Never done    HEPATITIS C SCREENING  Never done    HEPATITIS B IMMUNIZATION (1 of 3 - 19+ 3-dose series) Never done    LIPID  Never done    NICOTINE/TOBACCO CESSATION COUNSELING Q 1 YR  08/04/2022    YEARLY PREVENTIVE VISIT  05/26/2023    INFLUENZA VACCINE (1) 09/01/2023    COVID-19 Vaccine (4 - 2023-24 season) 09/01/2023    PHQ-9  02/10/2024    PAP  04/22/2024     Intervention/Education provided during outreach: Patient verbalized understanding, engaged in AIDET communication during patient encounter.     Plan: Patient was encouraged to reach out with any questions or concerns.    Care Coordinator will do no further outreaches at this time.    Jesika Mills [facility]  Clinic Care Coordination  [facility]  [email or web address]  [phone]  "

## 2024-03-16 DIAGNOSIS — F41.9 ANXIETY: ICD-10-CM

## 2024-03-18 RX ORDER — CLONIDINE HYDROCHLORIDE 0.1 MG/1
TABLET ORAL
Qty: 30 TABLET | Refills: 0 | OUTPATIENT
Start: 2024-03-18

## 2024-07-11 ENCOUNTER — WALK IN (OUTPATIENT)
Dept: URGENT CARE | Age: 49
End: 2024-07-11

## 2024-07-11 VITALS
OXYGEN SATURATION: 97 % | DIASTOLIC BLOOD PRESSURE: 72 MMHG | TEMPERATURE: 98.1 F | RESPIRATION RATE: 18 BRPM | SYSTOLIC BLOOD PRESSURE: 160 MMHG | HEART RATE: 71 BPM

## 2024-07-11 DIAGNOSIS — M54.42 ACUTE BILATERAL LOW BACK PAIN WITH BILATERAL SCIATICA: Primary | ICD-10-CM

## 2024-07-11 DIAGNOSIS — M54.41 ACUTE BILATERAL LOW BACK PAIN WITH BILATERAL SCIATICA: Primary | ICD-10-CM

## 2024-07-11 RX ORDER — TIZANIDINE 4 MG/1
4 TABLET ORAL EVERY 8 HOURS PRN
Qty: 12 TABLET | Refills: 0 | Status: SHIPPED | OUTPATIENT
Start: 2024-07-11

## 2024-07-11 RX ORDER — KETOROLAC TROMETHAMINE 30 MG/ML
30 INJECTION, SOLUTION INTRAMUSCULAR; INTRAVENOUS ONCE
Status: COMPLETED | OUTPATIENT
Start: 2024-07-11 | End: 2024-07-11

## 2024-07-11 RX ORDER — URSODIOL 500 MG/1
1000 TABLET, FILM COATED ORAL 2 TIMES DAILY
COMMUNITY
Start: 2024-06-24

## 2024-07-11 RX ORDER — TOPIRAMATE 100 MG/1
TABLET, FILM COATED ORAL
COMMUNITY
Start: 2024-06-24

## 2024-07-11 RX ORDER — URSODIOL 250 MG/1
TABLET, FILM COATED ORAL
COMMUNITY
Start: 2024-06-24

## 2024-07-11 RX ORDER — VERAPAMIL HYDROCHLORIDE 240 MG/1
240 TABLET, FILM COATED, EXTENDED RELEASE ORAL EVERY EVENING
COMMUNITY
Start: 2024-04-17

## 2024-07-11 RX ADMIN — KETOROLAC TROMETHAMINE 30 MG: 30 INJECTION, SOLUTION INTRAMUSCULAR; INTRAVENOUS at 13:30

## 2024-07-11 ASSESSMENT — ENCOUNTER SYMPTOMS
NAUSEA: 0
CONFUSION: 0
CHILLS: 0
FEVER: 0
VOMITING: 0
SHORTNESS OF BREATH: 0
LIGHT-HEADEDNESS: 0
ACTIVITY CHANGE: 0
ADENOPATHY: 0
SORE THROAT: 0
FATIGUE: 0
BACK PAIN: 1
DIZZINESS: 0
PHOTOPHOBIA: 0
COUGH: 0
DIARRHEA: 0
CONSTIPATION: 0
ABDOMINAL PAIN: 0
COLOR CHANGE: 0
HEADACHES: 0

## 2024-07-16 PROBLEM — F33.1 MODERATE EPISODE OF RECURRENT MAJOR DEPRESSIVE DISORDER  (CMD): Status: ACTIVE | Noted: 2022-05-30

## 2024-07-16 PROBLEM — F19.94: Status: ACTIVE | Noted: 2018-04-10

## 2024-07-16 PROBLEM — N39.41 URGE INCONTINENCE: Status: ACTIVE | Noted: 2024-07-16

## 2024-07-16 PROBLEM — D50.9 IRON DEFICIENCY ANEMIA: Status: ACTIVE | Noted: 2020-09-20

## 2024-07-16 PROBLEM — I10 ESSENTIAL HYPERTENSION: Status: ACTIVE | Noted: 2018-04-10

## 2024-07-16 PROBLEM — F10.20 ALCOHOL USE DISORDER, SEVERE, DEPENDENCE  (CMD): Status: ACTIVE | Noted: 2018-04-10

## 2024-07-16 PROBLEM — G89.29 OTHER CHRONIC PAIN: Status: ACTIVE | Noted: 2018-10-24

## 2024-07-16 PROBLEM — G47.33 OBSTRUCTIVE SLEEP APNEA ON CPAP: Status: ACTIVE | Noted: 2019-12-04

## 2024-07-16 PROBLEM — N32.81 OVERACTIVE BLADDER: Status: ACTIVE | Noted: 2022-05-30

## 2024-07-16 PROBLEM — E53.1 VITAMIN B6 DEFICIENCY: Status: ACTIVE | Noted: 2020-09-20

## 2024-07-16 PROBLEM — F50.814: Status: ACTIVE | Noted: 2020-09-10

## 2024-07-16 PROBLEM — N81.4 UTERINE PROLAPSE: Status: ACTIVE | Noted: 2022-05-30

## 2024-07-16 PROBLEM — Z98.84 HISTORY OF GASTRIC BYPASS: Status: RESOLVED | Noted: 2019-03-25 | Resolved: 2024-07-16

## 2024-07-16 PROBLEM — K76.0 FATTY LIVER: Status: ACTIVE | Noted: 2022-05-30

## 2024-07-16 PROBLEM — F41.9 ANXIETY: Status: ACTIVE | Noted: 2018-10-24

## 2024-07-16 PROBLEM — Z86.711 PERSONAL HISTORY OF PULMONARY EMBOLISM: Status: RESOLVED | Noted: 2018-04-10 | Resolved: 2024-07-16

## 2024-07-16 PROBLEM — F50.81: Status: ACTIVE | Noted: 2020-09-10

## 2024-07-16 PROBLEM — R80.9 MICROALBUMINURIA: Status: ACTIVE | Noted: 2022-06-21

## 2024-09-11 ENCOUNTER — WALK IN (OUTPATIENT)
Dept: URGENT CARE | Age: 49
End: 2024-09-11

## 2024-09-11 ENCOUNTER — HOSPITAL ENCOUNTER (EMERGENCY)
Age: 49
Discharge: LEFT BEFORE DISCHARGE | End: 2024-09-11

## 2024-09-11 ENCOUNTER — HOSPITAL ENCOUNTER (EMERGENCY)
Age: 49
Discharge: STILL ADMITTED OR ADMINISTRATIVE | End: 2024-09-11

## 2024-09-11 VITALS
DIASTOLIC BLOOD PRESSURE: 72 MMHG | TEMPERATURE: 98.1 F | HEART RATE: 86 BPM | RESPIRATION RATE: 16 BRPM | OXYGEN SATURATION: 100 % | SYSTOLIC BLOOD PRESSURE: 150 MMHG

## 2024-09-11 VITALS
HEART RATE: 78 BPM | DIASTOLIC BLOOD PRESSURE: 79 MMHG | RESPIRATION RATE: 16 BRPM | TEMPERATURE: 98 F | SYSTOLIC BLOOD PRESSURE: 132 MMHG | OXYGEN SATURATION: 99 %

## 2024-09-11 DIAGNOSIS — I10 UNCONTROLLED HYPERTENSION: ICD-10-CM

## 2024-09-11 DIAGNOSIS — R51.9 ACUTE INTRACTABLE HEADACHE, UNSPECIFIED HEADACHE TYPE: Primary | ICD-10-CM

## 2024-09-11 ASSESSMENT — ENCOUNTER SYMPTOMS
NAUSEA: 1
ABDOMINAL PAIN: 0
DIZZINESS: 0
LIGHT-HEADEDNESS: 0
DIAPHORESIS: 0
FEVER: 0
SHORTNESS OF BREATH: 0
WEAKNESS: 0
FATIGUE: 0
NUMBNESS: 1
PHOTOPHOBIA: 0
HEADACHES: 1
VOMITING: 0
FACIAL ASYMMETRY: 0
TREMORS: 0
CHILLS: 0
CHEST TIGHTNESS: 0
SPEECH DIFFICULTY: 0
DIARRHEA: 0

## 2024-09-11 ASSESSMENT — PAIN DESCRIPTION - PAIN TYPE: TYPE: ACUTE PAIN

## 2024-09-11 ASSESSMENT — PAIN SCALES - GENERAL: PAINLEVEL_OUTOF10: 7

## 2024-09-22 ENCOUNTER — HEALTH MAINTENANCE LETTER (OUTPATIENT)
Age: 49
End: 2024-09-22

## 2025-07-24 SDOH — ECONOMIC STABILITY: HOUSING INSECURITY: WHAT IS YOUR LIVING SITUATION TODAY?: I HAVE A PLACE TO LIVE TODAY, BUT I AM WORRIED ABOUT LOSING IT IN THE FUTURE

## 2025-07-24 SDOH — ECONOMIC STABILITY: TRANSPORTATION INSECURITY
IN THE PAST 12 MONTHS, HAS LACK OF RELIABLE TRANSPORTATION KEPT YOU FROM MEDICAL APPOINTMENTS, MEETINGS, WORK OR FROM GETTING THINGS NEEDED FOR DAILY LIVING?: YES

## 2025-07-24 SDOH — ECONOMIC STABILITY: FOOD INSECURITY: WITHIN THE PAST 12 MONTHS, THE FOOD YOU BOUGHT JUST DIDN'T LAST AND YOU DIDN'T HAVE MONEY TO GET MORE.: OFTEN TRUE

## 2025-07-24 SDOH — ECONOMIC STABILITY: HOUSING INSECURITY: DO YOU HAVE PROBLEMS WITH ANY OF THE FOLLOWING?: PESTS SUCH AS BUGS, ANTS, OR MICE

## 2025-07-24 ASSESSMENT — SOCIAL DETERMINANTS OF HEALTH (SDOH): IN THE PAST 12 MONTHS, HAS THE ELECTRIC, GAS, OIL, OR WATER COMPANY THREATENED TO SHUT OFF SERVICE IN YOUR HOME?: YES

## 2025-07-29 RX ORDER — GABAPENTIN 100 MG/1
1 CAPSULE ORAL 3 TIMES DAILY
COMMUNITY

## 2025-07-29 RX ORDER — ACETAMINOPHEN 325 MG/1
650 TABLET ORAL EVERY 6 HOURS PRN
COMMUNITY

## 2025-07-29 RX ORDER — TRAMADOL HYDROCHLORIDE 50 MG/1
TABLET ORAL
COMMUNITY

## 2025-07-29 RX ORDER — PRAVASTATIN SODIUM 40 MG
1 TABLET ORAL AT BEDTIME
COMMUNITY

## 2025-07-29 RX ORDER — VENLAFAXINE 75 MG/1
1 TABLET ORAL DAILY
COMMUNITY

## 2025-07-29 RX ORDER — ASPIRIN 81 MG/1
1 TABLET ORAL DAILY
COMMUNITY

## 2025-07-29 RX ORDER — ERGOCALCIFEROL 1.25 MG/1
CAPSULE ORAL
COMMUNITY

## 2025-07-29 RX ORDER — FUROSEMIDE 20 MG/1
1 TABLET ORAL DAILY
COMMUNITY

## 2025-07-29 RX ORDER — DEXTROAMPHETAMINE SACCHARATE, AMPHETAMINE ASPARTATE MONOHYDRATE, DEXTROAMPHETAMINE SULFATE AND AMPHETAMINE SULFATE 5; 5; 5; 5 MG/1; MG/1; MG/1; MG/1
20 CAPSULE, EXTENDED RELEASE ORAL DAILY
COMMUNITY
End: 2025-07-30 | Stop reason: ALTCHOICE

## 2025-07-29 RX ORDER — PANTOPRAZOLE SODIUM 40 MG/1
1 TABLET, DELAYED RELEASE ORAL DAILY
COMMUNITY

## 2025-07-29 RX ORDER — CLONIDINE HYDROCHLORIDE 0.1 MG/1
1 TABLET ORAL NIGHTLY
COMMUNITY

## 2025-07-29 RX ORDER — DEXTROAMPHETAMINE SACCHARATE, AMPHETAMINE ASPARTATE MONOHYDRATE, DEXTROAMPHETAMINE SULFATE AND AMPHETAMINE SULFATE 7.5; 7.5; 7.5; 7.5 MG/1; MG/1; MG/1; MG/1
CAPSULE, EXTENDED RELEASE ORAL
COMMUNITY
Start: 2023-09-28 | End: 2025-07-30 | Stop reason: ALTCHOICE

## 2025-07-29 RX ORDER — LOSARTAN POTASSIUM 100 MG/1
1 TABLET ORAL DAILY
COMMUNITY

## 2025-07-29 RX ORDER — ONDANSETRON 4 MG/1
4 TABLET, FILM COATED ORAL EVERY 8 HOURS PRN
COMMUNITY
Start: 2024-05-08

## 2025-07-30 ENCOUNTER — APPOINTMENT (OUTPATIENT)
Age: 50
End: 2025-07-30

## 2025-07-30 DIAGNOSIS — Z76.89 ENCOUNTER TO ESTABLISH CARE: Primary | ICD-10-CM

## 2025-07-30 DIAGNOSIS — E55.9 VITAMIN D DEFICIENCY: ICD-10-CM

## 2025-07-30 DIAGNOSIS — Z13.29 SCREENING FOR ENDOCRINE, METABOLIC AND IMMUNITY DISORDER: ICD-10-CM

## 2025-07-30 DIAGNOSIS — N32.81 OVERACTIVE BLADDER: ICD-10-CM

## 2025-07-30 DIAGNOSIS — G47.33 OBSTRUCTIVE SLEEP APNEA ON CPAP: ICD-10-CM

## 2025-07-30 DIAGNOSIS — R80.9 MICROALBUMINURIA: ICD-10-CM

## 2025-07-30 DIAGNOSIS — Z12.11 COLON CANCER SCREENING: ICD-10-CM

## 2025-07-30 DIAGNOSIS — Z13.228 SCREENING FOR ENDOCRINE, METABOLIC AND IMMUNITY DISORDER: ICD-10-CM

## 2025-07-30 DIAGNOSIS — E53.8 VITAMIN B12 DEFICIENCY: ICD-10-CM

## 2025-07-30 DIAGNOSIS — I10 ESSENTIAL HYPERTENSION: ICD-10-CM

## 2025-07-30 DIAGNOSIS — K27.9 PUD (PEPTIC ULCER DISEASE): ICD-10-CM

## 2025-07-30 DIAGNOSIS — D50.9 IRON DEFICIENCY ANEMIA, UNSPECIFIED IRON DEFICIENCY ANEMIA TYPE: ICD-10-CM

## 2025-07-30 DIAGNOSIS — Z13.0 SCREENING FOR ENDOCRINE, METABOLIC AND IMMUNITY DISORDER: ICD-10-CM

## 2025-07-30 DIAGNOSIS — E53.1 VITAMIN B6 DEFICIENCY: ICD-10-CM

## 2025-07-30 DIAGNOSIS — K76.0 FATTY LIVER: ICD-10-CM

## 2025-07-30 DIAGNOSIS — N81.4 UTERINE PROLAPSE: ICD-10-CM

## 2025-07-30 DIAGNOSIS — F90.9 ATTENTION DEFICIT HYPERACTIVITY DISORDER (ADHD), UNSPECIFIED ADHD TYPE: ICD-10-CM

## 2025-07-30 DIAGNOSIS — Z12.31 VISIT FOR SCREENING MAMMOGRAM: ICD-10-CM

## 2025-07-30 DIAGNOSIS — N39.41 URGE INCONTINENCE: ICD-10-CM

## 2025-07-30 RX ORDER — VERAPAMIL HYDROCHLORIDE 240 MG/1
240 TABLET, FILM COATED, EXTENDED RELEASE ORAL EVERY EVENING
Qty: 90 TABLET | Refills: 0 | Status: SHIPPED | OUTPATIENT
Start: 2025-07-30

## 2025-07-30 RX ORDER — LEVONORGESTREL 52 MG/1
INTRAUTERINE DEVICE INTRAUTERINE
COMMUNITY

## 2025-07-30 RX ORDER — LISDEXAMFETAMINE DIMESYLATE 30 MG/1
30 CAPSULE ORAL EVERY MORNING
Qty: 30 CAPSULE | Refills: 0 | Status: SHIPPED | OUTPATIENT
Start: 2025-07-30

## 2025-07-30 ASSESSMENT — ANXIETY QUESTIONNAIRES
7. FEELING AFRAID AS IF SOMETHING AWFUL MIGHT HAPPEN: 1 - SEVERAL DAYS
5. BEING SO RESTLESS THAT IT IS HARD TO SIT STILL: 3 - NEARLY EVERY DAY
GAD7 TOTAL SCORE: 18
6. BECOMING EASILY ANNOYED OR IRRITABLE: MORE THAN HALF THE DAYS
4. TROUBLE RELAXING: NEARLY EVERY DAY
1. FEELING NERVOUS, ANXIOUS, OR ON EDGE: 3 - NEARLY EVERY DAY
GAD7 TOTAL SCORE: 18
7. FEELING AFRAID AS IF SOMETHING AWFUL MIGHT HAPPEN: SEVERAL DAYS
IF YOU CHECKED OFF ANY PROBLEMS ON THIS QUESTIONNAIRE, HOW DIFFICULT HAVE THESE PROBLEMS MADE IT FOR YOU TO DO YOUR WORK, TAKE CARE OF THINGS AT HOME, OR GET ALONG WITH OTHER PEOPLE: VERY DIFFICULT
2. NOT BEING ABLE TO STOP OR CONTROL WORRYING: NEARLY EVERY DAY
2. NOT BEING ABLE TO STOP OR CONTROL WORRYING: 3 - NEARLY EVERY DAY
1. FEELING NERVOUS, ANXIOUS, OR ON EDGE: NEARLY EVERY DAY
6. BECOMING EASILY ANNOYED OR IRRITABLE: 2 - MORE THAN HALF THE DAYS
3. WORRYING TOO MUCH ABOUT DIFFERENT THINGS: NEARLY EVERY DAY
5. BEING SO RESTLESS THAT IT IS HARD TO SIT STILL: NEARLY EVERY DAY
IF YOU CHECKED OFF ANY PROBLEMS ON THIS QUESTIONNAIRE, HOW DIFFICULT HAVE THESE PROBLEMS MADE IT FOR YOU TO DO YOUR WORK, TAKE CARE OF THINGS AT HOME, OR GET ALONG WITH OTHER PEOPLE: VERY DIFFICULT
4. TROUBLE RELAXING: 3 - NEARLY EVERY DAY
3. WORRYING TOO MUCH ABOUT DIFFERENT THINGS: 3 - NEARLY EVERY DAY

## 2025-07-30 ASSESSMENT — PATIENT HEALTH QUESTIONNAIRE - PHQ9
CLINICAL INTERPRETATION OF PHQ9 SCORE: MODERATELY SEVERE DEPRESSION
2. FEELING DOWN, DEPRESSED OR HOPELESS: NEARLY EVERY DAY
4. FEELING TIRED OR HAVING LITTLE ENERGY: NEARLY EVERY DAY
SUM OF ALL RESPONSES TO PHQ9 QUESTIONS 1 AND 2: 5
7. TROUBLE CONCENTRATING ON THINGS, SUCH AS READING THE NEWSPAPER OR WATCHING TELEVISION: NEARLY EVERY DAY
8. MOVING OR SPEAKING SO SLOWLY THAT OTHER PEOPLE COULD HAVE NOTICED. OR THE OPPOSITE, BEING SO FIGETY OR RESTLESS THAT YOU HAVE BEEN MOVING AROUND A LOT MORE THAN USUAL: MORE THAN HALF THE DAYS
1. LITTLE INTEREST OR PLEASURE IN DOING THINGS: MORE THAN HALF THE DAYS
SUM OF ALL RESPONSES TO PHQ9 QUESTIONS 1 AND 2: 5
3. TROUBLE FALLING OR STAYING ASLEEP OR SLEEPING TOO MUCH: NEARLY EVERY DAY
CLINICAL INTERPRETATION OF PHQ2 SCORE: FURTHER SCREENING NEEDED
10. IF YOU CHECKED OFF ANY PROBLEMS, HOW DIFFICULT HAVE THESE PROBLEMS MADE IT FOR YOU TO DO YOUR WORK, TAKE CARE OF THINGS AT HOME, OR GET ALONG WITH OTHER PEOPLE: VERY DIFFICULT
5. POOR APPETITE, WEIGHT LOSS, OR OVEREATING: SEVERAL DAYS
9. THOUGHTS THAT YOU WOULD BE BETTER OFF DEAD, OR OF HURTING YOURSELF: NOT AT ALL
SUM OF ALL RESPONSES TO PHQ QUESTIONS 1-9: 19
6. FEELING BAD ABOUT YOURSELF - OR THAT YOU ARE A FAILURE OR HAVE LET YOURSELF OR YOUR FAMILY DOWN: MORE THAN HALF THE DAYS

## 2025-08-04 ENCOUNTER — TELEPHONE (OUTPATIENT)
Age: 50
End: 2025-08-04

## 2025-08-04 DIAGNOSIS — F90.9 ATTENTION DEFICIT HYPERACTIVITY DISORDER (ADHD), UNSPECIFIED ADHD TYPE: ICD-10-CM

## 2025-08-05 RX ORDER — LISDEXAMFETAMINE DIMESYLATE 30 MG/1
30 CAPSULE ORAL EVERY MORNING
Qty: 30 CAPSULE | Refills: 0 | Status: SHIPPED | OUTPATIENT
Start: 2025-08-05

## 2025-08-28 ENCOUNTER — APPOINTMENT (OUTPATIENT)
Age: 50
End: 2025-08-28

## 2025-08-28 DIAGNOSIS — I25.10 CORONARY ARTERY DISEASE INVOLVING NATIVE HEART WITHOUT ANGINA PECTORIS, UNSPECIFIED VESSEL OR LESION TYPE: ICD-10-CM

## 2025-08-28 DIAGNOSIS — F90.9 ATTENTION DEFICIT HYPERACTIVITY DISORDER (ADHD), UNSPECIFIED ADHD TYPE: Primary | ICD-10-CM

## 2025-08-28 DIAGNOSIS — Z12.11 COLON CANCER SCREENING: ICD-10-CM

## 2025-08-28 DIAGNOSIS — R06.09 DYSPNEA ON EXERTION: ICD-10-CM

## 2025-08-28 DIAGNOSIS — E66.813 OBESITY, CLASS III, BMI 40-49.9 (MORBID OBESITY): ICD-10-CM

## 2025-08-28 RX ORDER — LISDEXAMFETAMINE DIMESYLATE 40 MG/1
40 CAPSULE ORAL EVERY MORNING
Qty: 30 CAPSULE | Refills: 0 | Status: SHIPPED | OUTPATIENT
Start: 2025-08-29

## 2025-08-28 ASSESSMENT — ANXIETY QUESTIONNAIRES
7. FEELING AFRAID AS IF SOMETHING AWFUL MIGHT HAPPEN: 0 - NOT AT ALL
GAD7 TOTAL SCORE: 13
5. BEING SO RESTLESS THAT IT IS HARD TO SIT STILL: 3 - NEARLY EVERY DAY
6. BECOMING EASILY ANNOYED OR IRRITABLE: 1 - SEVERAL DAYS
GAD7 TOTAL SCORE: 13
5. BEING SO RESTLESS THAT IT IS HARD TO SIT STILL: NEARLY EVERY DAY
3. WORRYING TOO MUCH ABOUT DIFFERENT THINGS: MORE THAN HALF THE DAYS
4. TROUBLE RELAXING: 3 - NEARLY EVERY DAY
7. FEELING AFRAID AS IF SOMETHING AWFUL MIGHT HAPPEN: NOT AT ALL
1. FEELING NERVOUS, ANXIOUS, OR ON EDGE: 2 - MORE THAN HALF THE DAYS
6. BECOMING EASILY ANNOYED OR IRRITABLE: SEVERAL DAYS
3. WORRYING TOO MUCH ABOUT DIFFERENT THINGS: 2 - MORE THAN HALF THE DAYS
2. NOT BEING ABLE TO STOP OR CONTROL WORRYING: MORE THAN HALF THE DAYS
1. FEELING NERVOUS, ANXIOUS, OR ON EDGE: MORE THAN HALF THE DAYS
2. NOT BEING ABLE TO STOP OR CONTROL WORRYING: 2 - MORE THAN HALF THE DAYS
IF YOU CHECKED OFF ANY PROBLEMS ON THIS QUESTIONNAIRE, HOW DIFFICULT HAVE THESE PROBLEMS MADE IT FOR YOU TO DO YOUR WORK, TAKE CARE OF THINGS AT HOME, OR GET ALONG WITH OTHER PEOPLE: VERY DIFFICULT
4. TROUBLE RELAXING: NEARLY EVERY DAY
IF YOU CHECKED OFF ANY PROBLEMS ON THIS QUESTIONNAIRE, HOW DIFFICULT HAVE THESE PROBLEMS MADE IT FOR YOU TO DO YOUR WORK, TAKE CARE OF THINGS AT HOME, OR GET ALONG WITH OTHER PEOPLE: VERY DIFFICULT

## 2025-08-28 ASSESSMENT — PATIENT HEALTH QUESTIONNAIRE - PHQ9
2. FEELING DOWN, DEPRESSED OR HOPELESS: SEVERAL DAYS
SUM OF ALL RESPONSES TO PHQ9 QUESTIONS 1 AND 2: 2
SUM OF ALL RESPONSES TO PHQ9 QUESTIONS 1 AND 2: 2
1. LITTLE INTEREST OR PLEASURE IN DOING THINGS: SEVERAL DAYS

## 2025-09-19 ENCOUNTER — APPOINTMENT (OUTPATIENT)
Dept: MAMMOGRAPHY | Age: 50
End: 2025-09-19

## 2025-10-08 ENCOUNTER — APPOINTMENT (OUTPATIENT)
Dept: CARDIOLOGY | Age: 50
End: 2025-10-08

## 2025-10-09 ENCOUNTER — APPOINTMENT (OUTPATIENT)
Dept: OBGYN | Age: 50
End: 2025-10-09

## 2026-01-20 ENCOUNTER — APPOINTMENT (OUTPATIENT)
Dept: GASTROENTEROLOGY | Age: 51
End: 2026-01-20